# Patient Record
Sex: FEMALE | Race: BLACK OR AFRICAN AMERICAN | NOT HISPANIC OR LATINO | Employment: FULL TIME | URBAN - NONMETROPOLITAN AREA
[De-identification: names, ages, dates, MRNs, and addresses within clinical notes are randomized per-mention and may not be internally consistent; named-entity substitution may affect disease eponyms.]

---

## 2018-10-22 ENCOUNTER — HISTORICAL (OUTPATIENT)
Dept: ADMINISTRATIVE | Facility: HOSPITAL | Age: 23
End: 2018-10-22

## 2019-07-01 ENCOUNTER — HISTORICAL (OUTPATIENT)
Dept: ADMINISTRATIVE | Facility: HOSPITAL | Age: 24
End: 2019-07-01

## 2019-10-17 ENCOUNTER — HISTORICAL (OUTPATIENT)
Dept: ADMINISTRATIVE | Facility: HOSPITAL | Age: 24
End: 2019-10-17

## 2019-12-23 LAB
CLARITY, POC UA: CLEAR
COLOR, POC UA: YELLOW
GLUCOSE UR QL STRIP: NEGATIVE
LEUKOCYTE EST, POC UA: NEGATIVE
NITRITE, POC UA: NEGATIVE
PROTEIN, POC: NEGATIVE

## 2020-02-10 LAB
CLARITY, POC UA: NORMAL
COLOR, POC UA: YELLOW
GLUCOSE UR QL STRIP: NEGATIVE
LEUKOCYTE EST, POC UA: NEGATIVE
NITRITE, POC UA: NEGATIVE
PROTEIN, POC: NEGATIVE

## 2021-10-04 LAB
BILIRUB SERPL-MCNC: NEGATIVE MG/DL
BLOOD URINE, POC: NEGATIVE
CLARITY, POC UA: CLEAR
COLOR, POC UA: YELLOW
GLUCOSE UR QL STRIP: NEGATIVE
HBV SURFACE AG SERPL QL IA: NEGATIVE
HCV AB SERPL QL IA: NONREACTIVE
HIV 1+2 AB+HIV1 P24 AG SERPL QL IA: NORMAL
KETONES UR QL STRIP: NEGATIVE
LEUKOCYTE EST, POC UA: NORMAL
NITRITE, POC UA: NEGATIVE
PAP RECOMMENDATION EXT: NORMAL
PAP SMEAR: NORMAL
PH, POC UA: 7
POC BETA-HCG (QUAL): POSITIVE
PROTEIN, POC: NEGATIVE
RPR SER QL: NORMAL
SPECIFIC GRAVITY, POC UA: 1.02
UROBILINOGEN, POC UA: NORMAL

## 2021-10-19 LAB
BILIRUB SERPL-MCNC: NEGATIVE MG/DL
BLOOD URINE, POC: NORMAL
CLARITY, POC UA: CLEAR
COLOR, POC UA: YELLOW
GLUCOSE UR QL STRIP: NEGATIVE
KETONES UR QL STRIP: NEGATIVE
LEUKOCYTE EST, POC UA: NORMAL
NITRITE, POC UA: NEGATIVE
PH, POC UA: 6
PROTEIN, POC: NEGATIVE
SPECIFIC GRAVITY, POC UA: NORMAL
UROBILINOGEN, POC UA: NORMAL

## 2021-11-18 LAB
CLARITY, POC UA: CLEAR
COLOR, POC UA: YELLOW
GLUCOSE UR QL STRIP: NEGATIVE
LEUKOCYTE EST, POC UA: NEGATIVE
NITRITE, POC UA: POSITIVE
PROTEIN, POC: NEGATIVE

## 2022-01-04 ENCOUNTER — HISTORICAL (OUTPATIENT)
Dept: ADMINISTRATIVE | Facility: HOSPITAL | Age: 27
End: 2022-01-04

## 2022-01-11 LAB
CLARITY, POC UA: CLEAR
COLOR, POC UA: YELLOW
GLUCOSE UR QL STRIP: NEGATIVE
LEUKOCYTE EST, POC UA: NEGATIVE
NITRITE, POC UA: NEGATIVE

## 2022-03-15 LAB
ERYTHROCYTE [DISTWIDTH] IN BLOOD BY AUTOMATED COUNT: 14.4 % (ref 11–14.5)
HCT VFR BLD AUTO: 37 % (ref 36–48)
HGB BLD-MCNC: 12 G/DL (ref 11.8–16)
MCH RBC QN AUTO: 25.7 PG (ref 27–34)
MCHC RBC AUTO-ENTMCNC: 32.4 G/DL (ref 31–37)
MCV RBC AUTO: 79.2 FL (ref 79–99)
PLATELET # BLD AUTO: 384 10*3/UL (ref 140–371)
PMV BLD AUTO: 9.3 FL (ref 9.4–12.4)
RBC # BLD AUTO: 4.67 10*6/UL (ref 4–5.1)
WBC # SPEC AUTO: 9.3 10*3/UL (ref 4–11.5)

## 2022-04-10 ENCOUNTER — HISTORICAL (OUTPATIENT)
Dept: ADMINISTRATIVE | Facility: HOSPITAL | Age: 27
End: 2022-04-10

## 2022-04-27 VITALS
WEIGHT: 223.75 LBS | SYSTOLIC BLOOD PRESSURE: 134 MMHG | BODY MASS INDEX: 35.96 KG/M2 | HEIGHT: 66 IN | DIASTOLIC BLOOD PRESSURE: 72 MMHG

## 2022-05-04 ENCOUNTER — HISTORICAL (OUTPATIENT)
Dept: ADMINISTRATIVE | Facility: HOSPITAL | Age: 27
End: 2022-05-04

## 2022-09-21 ENCOUNTER — HISTORICAL (OUTPATIENT)
Dept: ADMINISTRATIVE | Facility: HOSPITAL | Age: 27
End: 2022-09-21

## 2023-01-23 ENCOUNTER — DOCUMENTATION ONLY (OUTPATIENT)
Dept: ADMINISTRATIVE | Facility: HOSPITAL | Age: 28
End: 2023-01-23

## 2023-04-09 ENCOUNTER — HOSPITAL ENCOUNTER (EMERGENCY)
Facility: HOSPITAL | Age: 28
Discharge: HOME | End: 2023-04-10
Attending: FAMILY MEDICINE
Payer: MEDICAID

## 2023-04-09 DIAGNOSIS — N30.00 ACUTE CYSTITIS WITHOUT HEMATURIA: Primary | ICD-10-CM

## 2023-04-09 DIAGNOSIS — R10.11 RIGHT UPPER QUADRANT ABDOMINAL PAIN: ICD-10-CM

## 2023-04-09 LAB
ALBUMIN SERPL-MCNC: 4.1 G/DL (ref 3.4–5)
ALBUMIN/GLOB SERPL: 1.2 RATIO
ALP SERPL-CCNC: 107 UNIT/L (ref 50–144)
ALT SERPL-CCNC: 20 UNIT/L (ref 1–45)
ANION GAP SERPL CALC-SCNC: 8 MEQ/L (ref 2–13)
APPEARANCE UR: CLEAR
AST SERPL-CCNC: 26 UNIT/L (ref 14–36)
B-HCG SERPL QL: NEGATIVE
BACTERIA #/AREA URNS AUTO: ABNORMAL /HPF
BASOPHILS # BLD AUTO: 0.03 X10(3)/MCL (ref 0.01–0.08)
BASOPHILS NFR BLD AUTO: 0.3 % (ref 0.1–1.2)
BILIRUB UR QL STRIP.AUTO: NEGATIVE MG/DL
BILIRUBIN DIRECT+TOT PNL SERPL-MCNC: 0.6 MG/DL (ref 0–1)
BUN SERPL-MCNC: 13 MG/DL (ref 7–20)
CALCIUM SERPL-MCNC: 9.1 MG/DL (ref 8.4–10.2)
CHLORIDE SERPL-SCNC: 108 MMOL/L (ref 98–110)
CO2 SERPL-SCNC: 22 MMOL/L (ref 21–32)
COLOR UR AUTO: YELLOW
CREAT SERPL-MCNC: 0.61 MG/DL (ref 0.66–1.25)
CREAT/UREA NIT SERPL: 21 (ref 12–20)
EOSINOPHIL # BLD AUTO: 0.33 X10(3)/MCL (ref 0.04–0.36)
EOSINOPHIL NFR BLD AUTO: 3.6 % (ref 0.7–7)
ERYTHROCYTE [DISTWIDTH] IN BLOOD BY AUTOMATED COUNT: 14.1 % (ref 11–14.5)
GFR SERPLBLD CREATININE-BSD FMLA CKD-EPI: >90 MLS/MIN/1.73/M2
GLOBULIN SER-MCNC: 3.4 GM/DL (ref 2–3.9)
GLUCOSE SERPL-MCNC: 96 MG/DL (ref 70–115)
GLUCOSE UR QL STRIP.AUTO: NEGATIVE MG/DL
HCT VFR BLD AUTO: 36.9 % (ref 36–48)
HGB BLD-MCNC: 12.2 G/DL (ref 11.8–16)
IMM GRANULOCYTES # BLD AUTO: 0.01 X10(3)/MCL (ref 0–0.03)
IMM GRANULOCYTES NFR BLD AUTO: 0.1 % (ref 0–0.5)
KETONES UR QL STRIP.AUTO: >=80 MG/DL
LEUKOCYTE ESTERASE UR QL STRIP.AUTO: NEGATIVE UNIT/L
LIPASE SERPL-CCNC: 41 U/L (ref 23–300)
LYMPHOCYTES # BLD AUTO: 2.6 X10(3)/MCL (ref 1.16–3.74)
LYMPHOCYTES NFR BLD AUTO: 28.1 % (ref 20–55)
MCH RBC QN AUTO: 26.2 PG (ref 27–34)
MCV RBC AUTO: 79.2 FL (ref 79–99)
MEAN CELL HEMOGLOBIN CONCENTRATION (OHS) G/DL: 33.1 G/DL (ref 31–37)
MONOCYTES # BLD AUTO: 0.5 X10(3)/MCL (ref 0.24–0.36)
MONOCYTES NFR BLD AUTO: 5.4 % (ref 4.7–12.5)
NEUTROPHILS # BLD AUTO: 5.79 X10(3)/MCL (ref 1.56–6.13)
NEUTROPHILS NFR BLD AUTO: 62.5 % (ref 37–73)
NITRITE UR QL STRIP.AUTO: POSITIVE
NRBC BLD AUTO-RTO: 0 % (ref 0–1)
PH UR STRIP.AUTO: 6 [PH]
PLATELET # BLD AUTO: 511 X10(3)/MCL (ref 140–371)
PMV BLD AUTO: 9.1 FL (ref 9.4–12.4)
POTASSIUM SERPL-SCNC: 3.5 MMOL/L (ref 3.5–5.1)
PROT SERPL-MCNC: 7.5 GM/DL (ref 6.3–8.2)
PROT UR QL STRIP.AUTO: NEGATIVE MG/DL
RBC # BLD AUTO: 4.66 X10(6)/MCL (ref 4–5.1)
RBC #/AREA URNS AUTO: ABNORMAL /HPF
RBC UR QL AUTO: ABNORMAL UNIT/L
SODIUM SERPL-SCNC: 138 MMOL/L (ref 135–145)
SP GR UR STRIP.AUTO: 1.02
SQUAMOUS #/AREA URNS AUTO: ABNORMAL /HPF
UROBILINOGEN UR STRIP-ACNC: 4 MG/DL
WBC # SPEC AUTO: 9.3 X10(3)/MCL (ref 4–11.5)
WBC #/AREA URNS AUTO: ABNORMAL /HPF

## 2023-04-09 PROCEDURE — 25000003 PHARM REV CODE 250: Performed by: FAMILY MEDICINE

## 2023-04-09 PROCEDURE — 25500020 PHARM REV CODE 255: Performed by: FAMILY MEDICINE

## 2023-04-09 PROCEDURE — 36415 COLL VENOUS BLD VENIPUNCTURE: CPT | Performed by: FAMILY MEDICINE

## 2023-04-09 PROCEDURE — 87088 URINE BACTERIA CULTURE: CPT | Performed by: FAMILY MEDICINE

## 2023-04-09 PROCEDURE — 96360 HYDRATION IV INFUSION INIT: CPT

## 2023-04-09 PROCEDURE — 63600175 PHARM REV CODE 636 W HCPCS: Performed by: FAMILY MEDICINE

## 2023-04-09 PROCEDURE — 85025 COMPLETE CBC W/AUTO DIFF WBC: CPT | Performed by: FAMILY MEDICINE

## 2023-04-09 PROCEDURE — 96365 THER/PROPH/DIAG IV INF INIT: CPT | Mod: 59

## 2023-04-09 PROCEDURE — 87077 CULTURE AEROBIC IDENTIFY: CPT | Performed by: FAMILY MEDICINE

## 2023-04-09 PROCEDURE — 83690 ASSAY OF LIPASE: CPT | Performed by: FAMILY MEDICINE

## 2023-04-09 PROCEDURE — 96375 TX/PRO/DX INJ NEW DRUG ADDON: CPT

## 2023-04-09 PROCEDURE — 81025 URINE PREGNANCY TEST: CPT | Performed by: FAMILY MEDICINE

## 2023-04-09 PROCEDURE — 99285 EMERGENCY DEPT VISIT HI MDM: CPT | Mod: 25

## 2023-04-09 PROCEDURE — 80053 COMPREHEN METABOLIC PANEL: CPT | Performed by: FAMILY MEDICINE

## 2023-04-09 PROCEDURE — 81001 URINALYSIS AUTO W/SCOPE: CPT | Performed by: FAMILY MEDICINE

## 2023-04-09 RX ORDER — HYDROMORPHONE HYDROCHLORIDE 1 MG/ML
1 INJECTION, SOLUTION INTRAMUSCULAR; INTRAVENOUS; SUBCUTANEOUS
Status: COMPLETED | OUTPATIENT
Start: 2023-04-09 | End: 2023-04-09

## 2023-04-09 RX ORDER — ONDANSETRON 2 MG/ML
8 INJECTION INTRAMUSCULAR; INTRAVENOUS
Status: COMPLETED | OUTPATIENT
Start: 2023-04-09 | End: 2023-04-09

## 2023-04-09 RX ORDER — SODIUM CHLORIDE 9 MG/ML
1000 INJECTION, SOLUTION INTRAVENOUS
Status: COMPLETED | OUTPATIENT
Start: 2023-04-09 | End: 2023-04-09

## 2023-04-09 RX ADMIN — IOPAMIDOL 100 ML: 755 INJECTION, SOLUTION INTRAVENOUS at 11:04

## 2023-04-09 RX ADMIN — SODIUM CHLORIDE 1000 ML: 9 INJECTION, SOLUTION INTRAVENOUS at 08:04

## 2023-04-09 RX ADMIN — CEFTRIAXONE SODIUM 1 G: 1 INJECTION, POWDER, FOR SOLUTION INTRAMUSCULAR; INTRAVENOUS at 11:04

## 2023-04-09 RX ADMIN — ONDANSETRON 8 MG: 2 INJECTION INTRAMUSCULAR; INTRAVENOUS at 11:04

## 2023-04-09 RX ADMIN — HYDROMORPHONE HYDROCHLORIDE 1 MG: 1 INJECTION, SOLUTION INTRAMUSCULAR; INTRAVENOUS; SUBCUTANEOUS at 11:04

## 2023-04-10 VITALS
HEIGHT: 66 IN | WEIGHT: 230 LBS | SYSTOLIC BLOOD PRESSURE: 140 MMHG | BODY MASS INDEX: 36.96 KG/M2 | HEART RATE: 91 BPM | OXYGEN SATURATION: 100 % | RESPIRATION RATE: 18 BRPM | DIASTOLIC BLOOD PRESSURE: 95 MMHG | TEMPERATURE: 98 F

## 2023-04-10 RX ORDER — ONDANSETRON 4 MG/1
8 TABLET, ORALLY DISINTEGRATING ORAL EVERY 8 HOURS PRN
Qty: 9 TABLET | Refills: 0 | Status: SHIPPED | OUTPATIENT
Start: 2023-04-10 | End: 2023-04-13

## 2023-04-10 RX ORDER — HYDROCODONE BITARTRATE AND ACETAMINOPHEN 5; 325 MG/1; MG/1
1 TABLET ORAL EVERY 6 HOURS PRN
Qty: 12 TABLET | Refills: 0 | Status: SHIPPED | OUTPATIENT
Start: 2023-04-10 | End: 2023-04-13

## 2023-04-10 RX ORDER — CIPROFLOXACIN 500 MG/1
500 TABLET ORAL EVERY 12 HOURS
Qty: 10 TABLET | Refills: 0 | Status: ON HOLD | OUTPATIENT
Start: 2023-04-10 | End: 2023-04-18 | Stop reason: HOSPADM

## 2023-04-10 NOTE — ED PROVIDER NOTES
Encounter Date: 4/9/2023       History     Chief Complaint   Patient presents with    Abdominal Pain     Patient c/o of RUQ abdominal pain x3 days. Has tried several OTC pain medication with no relief. Denies n/v/d     27-year-old black female presents to emergency room complaining of right upper quadrant pain with some radiation to her back for the last 3 days.  Patient states he is had some nausea but no vomiting or diarrhea.  Patient also denies any voiding complaints.  Patient states she does have bloating and belching with eating fried foods fatty foods.  Patient states eating is made the pain worse over the last 3 days.  Patient denies any fever.    The history is provided by the patient.   Review of patient's allergies indicates:  No Known Allergies  Past Medical History:   Diagnosis Date    Hypertension      History reviewed. No pertinent surgical history.  History reviewed. No pertinent family history.  Social History     Tobacco Use    Smoking status: Never    Smokeless tobacco: Never   Substance Use Topics    Alcohol use: Never    Drug use: Never     Review of Systems   Gastrointestinal:  Positive for abdominal pain and nausea. Negative for diarrhea and vomiting.   All other systems reviewed and are negative.    Physical Exam     Initial Vitals [04/09/23 2016]   BP Pulse Resp Temp SpO2   (!) 170/107 102 18 98.8 °F (37.1 °C) 100 %      MAP       --         Physical Exam    Nursing note and vitals reviewed.  Constitutional:   Moderately obese black female alert in no acute distress.   HENT:   In his atraumatic and normocephalic.  Oropharynx is clear with moist mucous membranes.  Nose is clear with no discharge.   Neck:   Neck is supple with full range of motion with no cervical adenopathy.   Cardiovascular:            Heart reveals tachycardic regular rate and rhythm out murmur.   Pulmonary/Chest:   Lungs are clear to auscultation bilaterally.   Abdominal:   Abdomen has positive bowel sounds throughout.   Marked right upper quadrant tenderness with positive Mckeon's sign.  No other guarding or rebound is noted.  Plus minus right CVA tenderness.   Musculoskeletal:      Comments: Extremities with full range of motion throughout with no clubbing cyanosis or edema.     Neurological:   Patient is alert and oriented x3 with normal sensation and strength in bilateral upper lower extremities.   Skin:   Skin is warm and dry.   Psychiatric: She has a normal mood and affect. Her behavior is normal. Thought content normal.       ED Course   Procedures  Labs Reviewed   COMPREHENSIVE METABOLIC PANEL - Abnormal; Notable for the following components:       Result Value    Creatinine 0.61 (*)     BUN/Creatinine Ratio 21 (*)     All other components within normal limits   CBC WITH DIFFERENTIAL - Abnormal; Notable for the following components:    MCH 26.2 (*)     Platelet 511 (*)     MPV 9.1 (*)     Mono # 0.50 (*)     All other components within normal limits   URINALYSIS - Abnormal; Notable for the following components:    Ketones, UA >=80 (*)     Blood, UA Large (*)     Urobilinogen, UA 4.0 (*)     Nitrites, UA Positive (*)     All other components within normal limits    Narrative:      URINE STABILITY IS 2 HOURS AT ROOM TEMP OR    SIX HOURS REFRIGERATED. PERFORMING TESTING ON    SPECIMENS GREATER THAN THIS AGE MAY AFFECT THE    FOLLOWING TESTS:    PH          SPECIFIC GRAVITY           BLOOD    CLARITY     BILIRUBIN               UROBILINOGEN   URINALYSIS, MICROSCOPIC - Abnormal; Notable for the following components:    Bacteria, UA 4+ (*)     WBC, UA 6-10 (*)     Squamous Epithelial Cells, UA Few (*)     All other components within normal limits   LIPASE - Normal   HCG QUALITATIVE URINE - Normal   CULTURE, URINE   CBC W/ AUTO DIFFERENTIAL    Narrative:     The following orders were created for panel order CBC auto differential.  Procedure                               Abnormality         Status                     ---------                                -----------         ------                     CBC with Differential[[id number]]        Abnormal            Final result                 Please view results for these tests on the individual orders.          Imaging Results              CT Abdomen Pelvis With Contrast (Final result)  Result time 04/09/23 23:54:36      Final result by Clementina Pereyra MD (04/09/23 23:54:36)                   Impression:       No acute findings are identified    All CT scans at [this location] are performed using dose modulation techniques as appropriate to a performed exam including the following: automated exposure control; adjustment of the mA and/or kV according to patient size (this includes techniques or standardized protocols for targeted exams where dose is matched to indication / reason for exam; i.e. extremities or head); use of iterative reconstruction technique.    Finalized on: 4/9/2023 11:54 PM By:  Clementina Pereyra MD  [facility]# [id number]      2023-04-09 23:56:44.930    [facility]               Narrative:    EXAM: CT ABDOMEN PELVIS WITH CONTRAST    CLINICAL INDICATION:  Right lower quadrant pain    TECHNIQUE: Axial and multiplanar 2-D reformations provided    FINDINGS:  Appendix normal caliber.  No obstructive bowel findings    Liver, spleen pancreas and kidneys unremarkable.  Gallbladder present  Urinary bladder decompressed  Intrauterine device in place                                         Medications   cefTRIAXone (ROCEPHIN) 1 g in dextrose 5 % in water (D5W) 5 % 50 mL IVPB (MB+) (1 g Intravenous New Bag 4/9/23 2350)   0.9%  NaCl infusion (0 mLs Intravenous Stopped 4/9/23 2153)   iopamidoL (ISOVUE-370) injection 100 mL (100 mLs Intravenous Given 4/9/23 2332)   ondansetron injection 8 mg (8 mg Intravenous Given 4/9/23 2349)   HYDROmorphone injection 1 mg (1 mg Intravenous Given 4/9/23 2349)     Medical Decision Making:   Initial Assessment:   Biliary colic  Differential Diagnosis:   Cholecystitis,  pyelonephritis, GERD, gastritis  Clinical Tests:   Lab Tests: Ordered and Reviewed  The following lab test(s) were unremarkable: CBC, CMP, Urinalysis and Lipase  Radiological Study: Ordered and Reviewed  ED Management:  Patient initially refused pain medications but after CT scan she requested nausea and pain medications.  CT of abdomen and pelvis is unremarkable no acute findings.  Patient is noted to have a UTI on her urinalysis when she will be treated for my given g Rocephin here in the emergency room and sent her home on Cipro b.i.d. for 5 days.  Patient instructed to follow up with her PCP for further abdominal pain workup and possibly a HIDA scan.                        Clinical Impression:   Final diagnoses:  [N30.00] Acute cystitis without hematuria (Primary)  [R10.11] Right upper quadrant abdominal pain               Roe Fine MD  04/10/23 0007

## 2023-04-12 ENCOUNTER — HOSPITAL ENCOUNTER (INPATIENT)
Facility: HOSPITAL | Age: 28
LOS: 6 days | Discharge: HOME | DRG: 418 | End: 2023-04-18
Attending: FAMILY MEDICINE | Admitting: FAMILY MEDICINE
Payer: MEDICAID

## 2023-04-12 DIAGNOSIS — K80.00 ACUTE CHOLECYSTITIS DUE TO BILIARY CALCULUS: Primary | ICD-10-CM

## 2023-04-12 LAB — BACTERIA UR CULT: ABNORMAL

## 2023-04-12 PROCEDURE — 99285 EMERGENCY DEPT VISIT HI MDM: CPT | Mod: 25

## 2023-04-12 PROCEDURE — 96375 TX/PRO/DX INJ NEW DRUG ADDON: CPT

## 2023-04-12 PROCEDURE — 11000001 HC ACUTE MED/SURG PRIVATE ROOM

## 2023-04-12 PROCEDURE — 96374 THER/PROPH/DIAG INJ IV PUSH: CPT

## 2023-04-12 NOTE — Clinical Note
Rossy Castro accompanied their sister(s) to the emergency department on 4/12/2023. They may return to work on 04/14/2023.      If you have any questions or concerns, please don't hesitate to call.      Uzma PINTO

## 2023-04-13 ENCOUNTER — ANESTHESIA (OUTPATIENT)
Dept: SURGERY | Facility: HOSPITAL | Age: 28
DRG: 418 | End: 2023-04-13
Payer: MEDICAID

## 2023-04-13 ENCOUNTER — ANESTHESIA EVENT (OUTPATIENT)
Dept: SURGERY | Facility: HOSPITAL | Age: 28
DRG: 418 | End: 2023-04-13
Payer: MEDICAID

## 2023-04-13 PROBLEM — K81.0 ACUTE CHOLECYSTITIS: Status: ACTIVE | Noted: 2023-04-13

## 2023-04-13 LAB
ALBUMIN SERPL-MCNC: 3.4 G/DL (ref 3.4–5)
ALBUMIN SERPL-MCNC: 3.8 G/DL (ref 3.4–5)
ALBUMIN SERPL-MCNC: 4.5 G/DL (ref 3.4–5)
ALBUMIN/GLOB SERPL: 1.1 RATIO
ALBUMIN/GLOB SERPL: 1.2 RATIO
ALBUMIN/GLOB SERPL: 1.3 RATIO
ALP SERPL-CCNC: 123 UNIT/L (ref 50–144)
ALP SERPL-CCNC: 137 UNIT/L (ref 50–144)
ALP SERPL-CCNC: 154 UNIT/L (ref 50–144)
ALT SERPL-CCNC: 57 UNIT/L (ref 1–45)
ALT SERPL-CCNC: 61 UNIT/L (ref 1–45)
ALT SERPL-CCNC: 64 UNIT/L (ref 1–45)
ANION GAP SERPL CALC-SCNC: 10 MEQ/L (ref 2–13)
ANION GAP SERPL CALC-SCNC: 6 MEQ/L (ref 2–13)
ANION GAP SERPL CALC-SCNC: 7 MEQ/L (ref 2–13)
APPEARANCE UR: CLEAR
AST SERPL-CCNC: 52 UNIT/L (ref 14–36)
AST SERPL-CCNC: 63 UNIT/L (ref 14–36)
AST SERPL-CCNC: 65 UNIT/L (ref 14–36)
B-HCG SERPL QL: NEGATIVE
BACTERIA #/AREA URNS AUTO: ABNORMAL /HPF
BASOPHILS # BLD AUTO: 0.01 X10(3)/MCL (ref 0.01–0.08)
BASOPHILS # BLD AUTO: 0.03 X10(3)/MCL (ref 0.01–0.08)
BASOPHILS # BLD AUTO: 0.03 X10(3)/MCL (ref 0.01–0.08)
BASOPHILS NFR BLD AUTO: 0.1 % (ref 0.1–1.2)
BASOPHILS NFR BLD AUTO: 0.2 % (ref 0.1–1.2)
BASOPHILS NFR BLD AUTO: 0.2 % (ref 0.1–1.2)
BILIRUB UR QL STRIP.AUTO: NEGATIVE MG/DL
BILIRUBIN DIRECT+TOT PNL SERPL-MCNC: 0.8 MG/DL (ref 0–1)
BILIRUBIN DIRECT+TOT PNL SERPL-MCNC: 0.9 MG/DL (ref 0–1)
BILIRUBIN DIRECT+TOT PNL SERPL-MCNC: 0.9 MG/DL (ref 0–1)
BUN SERPL-MCNC: 2 MG/DL (ref 7–20)
BUN SERPL-MCNC: 2 MG/DL (ref 7–20)
BUN SERPL-MCNC: 3 MG/DL (ref 7–20)
CALCIUM SERPL-MCNC: 8.6 MG/DL (ref 8.4–10.2)
CALCIUM SERPL-MCNC: 8.7 MG/DL (ref 8.4–10.2)
CALCIUM SERPL-MCNC: 9.7 MG/DL (ref 8.4–10.2)
CHLORIDE SERPL-SCNC: 101 MMOL/L (ref 98–110)
CHLORIDE SERPL-SCNC: 104 MMOL/L (ref 98–110)
CHLORIDE SERPL-SCNC: 99 MMOL/L (ref 98–110)
CO2 SERPL-SCNC: 25 MMOL/L (ref 21–32)
CO2 SERPL-SCNC: 27 MMOL/L (ref 21–32)
CO2 SERPL-SCNC: 30 MMOL/L (ref 21–32)
COLOR UR AUTO: YELLOW
CREAT SERPL-MCNC: 0.56 MG/DL (ref 0.66–1.25)
CREAT SERPL-MCNC: 0.57 MG/DL (ref 0.66–1.25)
CREAT SERPL-MCNC: 0.7 MG/DL (ref 0.66–1.25)
CREAT/UREA NIT SERPL: 4 (ref 12–20)
EOSINOPHIL # BLD AUTO: 0 X10(3)/MCL (ref 0.04–0.36)
EOSINOPHIL # BLD AUTO: 0.06 X10(3)/MCL (ref 0.04–0.36)
EOSINOPHIL # BLD AUTO: 0.07 X10(3)/MCL (ref 0.04–0.36)
EOSINOPHIL NFR BLD AUTO: 0 % (ref 0.7–7)
EOSINOPHIL NFR BLD AUTO: 0.5 % (ref 0.7–7)
EOSINOPHIL NFR BLD AUTO: 0.5 % (ref 0.7–7)
ERYTHROCYTE [DISTWIDTH] IN BLOOD BY AUTOMATED COUNT: 13.3 % (ref 11–14.5)
ERYTHROCYTE [DISTWIDTH] IN BLOOD BY AUTOMATED COUNT: 13.3 % (ref 11–14.5)
ERYTHROCYTE [DISTWIDTH] IN BLOOD BY AUTOMATED COUNT: 13.6 % (ref 11–14.5)
GFR SERPLBLD CREATININE-BSD FMLA CKD-EPI: >90 MLS/MIN/1.73/M2
GLOBULIN SER-MCNC: 2.9 GM/DL (ref 2–3.9)
GLOBULIN SER-MCNC: 3 GM/DL (ref 2–3.9)
GLOBULIN SER-MCNC: 4.2 GM/DL (ref 2–3.9)
GLUCOSE SERPL-MCNC: 121 MG/DL (ref 70–115)
GLUCOSE SERPL-MCNC: 132 MG/DL (ref 70–115)
GLUCOSE SERPL-MCNC: 143 MG/DL (ref 70–115)
GLUCOSE UR QL STRIP.AUTO: NEGATIVE MG/DL
HCT VFR BLD AUTO: 37.3 % (ref 36–48)
HCT VFR BLD AUTO: 38.5 % (ref 36–48)
HCT VFR BLD AUTO: 40.5 % (ref 36–48)
HGB BLD-MCNC: 12.2 G/DL (ref 11.8–16)
HGB BLD-MCNC: 12.5 G/DL (ref 11.8–16)
HGB BLD-MCNC: 13.6 G/DL (ref 11.8–16)
IMM GRANULOCYTES # BLD AUTO: 0.03 X10(3)/MCL (ref 0–0.03)
IMM GRANULOCYTES # BLD AUTO: 0.05 X10(3)/MCL (ref 0–0.03)
IMM GRANULOCYTES # BLD AUTO: 0.05 X10(3)/MCL (ref 0–0.03)
IMM GRANULOCYTES NFR BLD AUTO: 0.2 % (ref 0–0.5)
IMM GRANULOCYTES NFR BLD AUTO: 0.3 % (ref 0–0.5)
IMM GRANULOCYTES NFR BLD AUTO: 0.4 % (ref 0–0.5)
KETONES UR QL STRIP.AUTO: 40 MG/DL
LEUKOCYTE ESTERASE UR QL STRIP.AUTO: ABNORMAL UNIT/L
LIPASE SERPL-CCNC: 51 U/L (ref 23–300)
LYMPHOCYTES # BLD AUTO: 1.07 X10(3)/MCL (ref 1.16–3.74)
LYMPHOCYTES # BLD AUTO: 1.92 X10(3)/MCL (ref 1.16–3.74)
LYMPHOCYTES # BLD AUTO: 1.95 X10(3)/MCL (ref 1.16–3.74)
LYMPHOCYTES NFR BLD AUTO: 14.3 % (ref 20–55)
LYMPHOCYTES NFR BLD AUTO: 15.3 % (ref 20–55)
LYMPHOCYTES NFR BLD AUTO: 5.9 % (ref 20–55)
MAGNESIUM SERPL-MCNC: 2.2 MG/DL (ref 1.8–2.4)
MCH RBC QN AUTO: 25.2 PG (ref 27–34)
MCH RBC QN AUTO: 25.8 PG (ref 27–34)
MCH RBC QN AUTO: 26.3 PG (ref 27–34)
MCV RBC AUTO: 77.6 FL (ref 79–99)
MCV RBC AUTO: 78.2 FL (ref 79–99)
MCV RBC AUTO: 79 FL (ref 79–99)
MEAN CELL HEMOGLOBIN CONCENTRATION (OHS) G/DL: 32.5 G/DL (ref 31–37)
MEAN CELL HEMOGLOBIN CONCENTRATION (OHS) G/DL: 32.7 G/DL (ref 31–37)
MEAN CELL HEMOGLOBIN CONCENTRATION (OHS) G/DL: 33.6 G/DL (ref 31–37)
MONOCYTES # BLD AUTO: 0.78 X10(3)/MCL (ref 0.24–0.36)
MONOCYTES # BLD AUTO: 0.8 X10(3)/MCL (ref 0.24–0.36)
MONOCYTES # BLD AUTO: 0.81 X10(3)/MCL (ref 0.24–0.36)
MONOCYTES NFR BLD AUTO: 4.3 % (ref 4.7–12.5)
MONOCYTES NFR BLD AUTO: 5.9 % (ref 4.7–12.5)
MONOCYTES NFR BLD AUTO: 6.4 % (ref 4.7–12.5)
NEUTROPHILS # BLD AUTO: 10.76 X10(3)/MCL (ref 1.56–6.13)
NEUTROPHILS # BLD AUTO: 16.15 X10(3)/MCL (ref 1.56–6.13)
NEUTROPHILS # BLD AUTO: 9.66 X10(3)/MCL (ref 1.56–6.13)
NEUTROPHILS NFR BLD AUTO: 77.2 % (ref 37–73)
NEUTROPHILS NFR BLD AUTO: 78.9 % (ref 37–73)
NEUTROPHILS NFR BLD AUTO: 89.4 % (ref 37–73)
NITRITE UR QL STRIP.AUTO: NEGATIVE
NRBC BLD AUTO-RTO: 0 % (ref 0–1)
PH UR STRIP.AUTO: 6 [PH]
PLATELET # BLD AUTO: 502 X10(3)/MCL (ref 140–371)
PLATELET # BLD AUTO: 516 X10(3)/MCL (ref 140–371)
PLATELET # BLD AUTO: 562 X10(3)/MCL (ref 140–371)
PMV BLD AUTO: 8.6 FL (ref 9.4–12.4)
PMV BLD AUTO: 8.7 FL (ref 9.4–12.4)
PMV BLD AUTO: 8.9 FL (ref 9.4–12.4)
POTASSIUM SERPL-SCNC: 2.9 MMOL/L (ref 3.5–5.1)
POTASSIUM SERPL-SCNC: 3.1 MMOL/L (ref 3.5–5.1)
POTASSIUM SERPL-SCNC: 3.6 MMOL/L (ref 3.5–5.1)
PROT SERPL-MCNC: 6.3 GM/DL (ref 6.3–8.2)
PROT SERPL-MCNC: 6.8 GM/DL (ref 6.3–8.2)
PROT SERPL-MCNC: 8.7 GM/DL (ref 6.3–8.2)
PROT UR QL STRIP.AUTO: ABNORMAL MG/DL
RBC # BLD AUTO: 4.72 X10(6)/MCL (ref 4–5.1)
RBC # BLD AUTO: 4.96 X10(6)/MCL (ref 4–5.1)
RBC # BLD AUTO: 5.18 X10(6)/MCL (ref 4–5.1)
RBC #/AREA URNS AUTO: ABNORMAL /HPF
RBC UR QL AUTO: ABNORMAL UNIT/L
SARS-COV-2 RDRP RESP QL NAA+PROBE: NEGATIVE
SODIUM SERPL-SCNC: 136 MMOL/L (ref 135–145)
SODIUM SERPL-SCNC: 136 MMOL/L (ref 135–145)
SODIUM SERPL-SCNC: 137 MMOL/L (ref 135–145)
SP GR UR STRIP.AUTO: 1.02
SQUAMOUS #/AREA URNS AUTO: ABNORMAL /HPF
UROBILINOGEN UR STRIP-ACNC: 0.2 MG/DL
WBC # SPEC AUTO: 12.5 X10(3)/MCL (ref 4–11.5)
WBC # SPEC AUTO: 13.7 X10(3)/MCL (ref 4–11.5)
WBC # SPEC AUTO: 18.1 X10(3)/MCL (ref 4–11.5)
WBC #/AREA URNS AUTO: ABNORMAL /HPF

## 2023-04-13 PROCEDURE — D9220A PRA ANESTHESIA: Mod: ,,, | Performed by: ANESTHESIOLOGY

## 2023-04-13 PROCEDURE — 25000003 PHARM REV CODE 250: Performed by: SURGERY

## 2023-04-13 PROCEDURE — 11000001 HC ACUTE MED/SURG PRIVATE ROOM

## 2023-04-13 PROCEDURE — 63600175 PHARM REV CODE 636 W HCPCS: Performed by: SURGERY

## 2023-04-13 PROCEDURE — 37000009 HC ANESTHESIA EA ADD 15 MINS: Performed by: SURGERY

## 2023-04-13 PROCEDURE — 63600175 PHARM REV CODE 636 W HCPCS: Performed by: FAMILY MEDICINE

## 2023-04-13 PROCEDURE — 27000221 HC OXYGEN, UP TO 24 HOURS

## 2023-04-13 PROCEDURE — 37000008 HC ANESTHESIA 1ST 15 MINUTES: Performed by: SURGERY

## 2023-04-13 PROCEDURE — C9113 INJ PANTOPRAZOLE SODIUM, VIA: HCPCS | Performed by: FAMILY MEDICINE

## 2023-04-13 PROCEDURE — 25000003 PHARM REV CODE 250: Performed by: FAMILY MEDICINE

## 2023-04-13 PROCEDURE — 87088 URINE BACTERIA CULTURE: CPT | Performed by: FAMILY MEDICINE

## 2023-04-13 PROCEDURE — 94761 N-INVAS EAR/PLS OXIMETRY MLT: CPT

## 2023-04-13 PROCEDURE — 80053 COMPREHEN METABOLIC PANEL: CPT | Performed by: FAMILY MEDICINE

## 2023-04-13 PROCEDURE — 63600175 PHARM REV CODE 636 W HCPCS: Performed by: INTERNAL MEDICINE

## 2023-04-13 PROCEDURE — 85025 COMPLETE CBC W/AUTO DIFF WBC: CPT | Performed by: FAMILY MEDICINE

## 2023-04-13 PROCEDURE — 85025 COMPLETE CBC W/AUTO DIFF WBC: CPT | Performed by: SURGERY

## 2023-04-13 PROCEDURE — 36000709 HC OR TIME LEV III EA ADD 15 MIN: Performed by: SURGERY

## 2023-04-13 PROCEDURE — 63600175 PHARM REV CODE 636 W HCPCS: Performed by: ANESTHESIOLOGY

## 2023-04-13 PROCEDURE — 25000003 PHARM REV CODE 250: Performed by: ANESTHESIOLOGY

## 2023-04-13 PROCEDURE — 85025 COMPLETE CBC W/AUTO DIFF WBC: CPT | Performed by: INTERNAL MEDICINE

## 2023-04-13 PROCEDURE — D9220A PRA ANESTHESIA: ICD-10-PCS | Mod: ,,, | Performed by: ANESTHESIOLOGY

## 2023-04-13 PROCEDURE — 87635 SARS-COV-2 COVID-19 AMP PRB: CPT | Performed by: FAMILY MEDICINE

## 2023-04-13 PROCEDURE — 36000708 HC OR TIME LEV III 1ST 15 MIN: Performed by: SURGERY

## 2023-04-13 PROCEDURE — 81025 URINE PREGNANCY TEST: CPT | Performed by: FAMILY MEDICINE

## 2023-04-13 PROCEDURE — 83690 ASSAY OF LIPASE: CPT | Performed by: FAMILY MEDICINE

## 2023-04-13 PROCEDURE — 80053 COMPREHEN METABOLIC PANEL: CPT | Performed by: SURGERY

## 2023-04-13 PROCEDURE — 36415 COLL VENOUS BLD VENIPUNCTURE: CPT | Performed by: INTERNAL MEDICINE

## 2023-04-13 PROCEDURE — 71000033 HC RECOVERY, INTIAL HOUR: Performed by: SURGERY

## 2023-04-13 PROCEDURE — 83735 ASSAY OF MAGNESIUM: CPT | Performed by: FAMILY MEDICINE

## 2023-04-13 PROCEDURE — 81001 URINALYSIS AUTO W/SCOPE: CPT | Performed by: FAMILY MEDICINE

## 2023-04-13 PROCEDURE — 87040 BLOOD CULTURE FOR BACTERIA: CPT | Performed by: FAMILY MEDICINE

## 2023-04-13 PROCEDURE — 36415 COLL VENOUS BLD VENIPUNCTURE: CPT | Performed by: FAMILY MEDICINE

## 2023-04-13 PROCEDURE — 27201423 OPTIME MED/SURG SUP & DEVICES STERILE SUPPLY: Performed by: SURGERY

## 2023-04-13 PROCEDURE — C1729 CATH, DRAINAGE: HCPCS | Performed by: SURGERY

## 2023-04-13 RX ORDER — ONDANSETRON 2 MG/ML
4 INJECTION INTRAMUSCULAR; INTRAVENOUS EVERY 6 HOURS PRN
Status: DISCONTINUED | OUTPATIENT
Start: 2023-04-13 | End: 2023-04-18 | Stop reason: HOSPADM

## 2023-04-13 RX ORDER — FAMOTIDINE 20 MG/1
20 TABLET, FILM COATED ORAL
Status: COMPLETED | OUTPATIENT
Start: 2023-04-13 | End: 2023-04-13

## 2023-04-13 RX ORDER — MIDAZOLAM HYDROCHLORIDE 1 MG/ML
2 INJECTION INTRAMUSCULAR; INTRAVENOUS
Status: COMPLETED | OUTPATIENT
Start: 2023-04-13 | End: 2023-04-13

## 2023-04-13 RX ORDER — GLYCOPYRROLATE 0.2 MG/ML
0.2 INJECTION INTRAMUSCULAR; INTRAVENOUS
Status: COMPLETED | OUTPATIENT
Start: 2023-04-13 | End: 2023-04-13

## 2023-04-13 RX ORDER — CLONIDINE HYDROCHLORIDE 0.1 MG/1
0.2 TABLET ORAL EVERY 6 HOURS PRN
Status: DISCONTINUED | OUTPATIENT
Start: 2023-04-13 | End: 2023-04-18 | Stop reason: HOSPADM

## 2023-04-13 RX ORDER — HYDROMORPHONE HYDROCHLORIDE 2 MG/ML
INJECTION, SOLUTION INTRAMUSCULAR; INTRAVENOUS; SUBCUTANEOUS
Status: DISCONTINUED | OUTPATIENT
Start: 2023-04-13 | End: 2023-04-13

## 2023-04-13 RX ORDER — MIDAZOLAM HYDROCHLORIDE 1 MG/ML
2 INJECTION INTRAMUSCULAR; INTRAVENOUS
Status: DISCONTINUED | OUTPATIENT
Start: 2023-04-14 | End: 2023-04-13

## 2023-04-13 RX ORDER — HYDROMORPHONE HYDROCHLORIDE 1 MG/ML
1 INJECTION, SOLUTION INTRAMUSCULAR; INTRAVENOUS; SUBCUTANEOUS EVERY 6 HOURS PRN
Status: DISPENSED | OUTPATIENT
Start: 2023-04-13 | End: 2023-04-15

## 2023-04-13 RX ORDER — TALC
6 POWDER (GRAM) TOPICAL NIGHTLY PRN
Status: DISCONTINUED | OUTPATIENT
Start: 2023-04-13 | End: 2023-04-18 | Stop reason: HOSPADM

## 2023-04-13 RX ORDER — ONDANSETRON 2 MG/ML
8 INJECTION INTRAMUSCULAR; INTRAVENOUS
Status: COMPLETED | OUTPATIENT
Start: 2023-04-13 | End: 2023-04-13

## 2023-04-13 RX ORDER — ONDANSETRON 2 MG/ML
8 INJECTION INTRAMUSCULAR; INTRAVENOUS EVERY 8 HOURS PRN
Status: DISCONTINUED | OUTPATIENT
Start: 2023-04-13 | End: 2023-04-13 | Stop reason: SDUPTHER

## 2023-04-13 RX ORDER — KETOROLAC TROMETHAMINE 30 MG/ML
30 INJECTION, SOLUTION INTRAMUSCULAR; INTRAVENOUS ONCE
Status: COMPLETED | OUTPATIENT
Start: 2023-04-13 | End: 2023-04-13

## 2023-04-13 RX ORDER — SODIUM CHLORIDE AND POTASSIUM CHLORIDE 300; 900 MG/100ML; MG/100ML
INJECTION, SOLUTION INTRAVENOUS CONTINUOUS
Status: DISCONTINUED | OUTPATIENT
Start: 2023-04-13 | End: 2023-04-18

## 2023-04-13 RX ORDER — PANTOPRAZOLE SODIUM 40 MG/10ML
40 INJECTION, POWDER, LYOPHILIZED, FOR SOLUTION INTRAVENOUS DAILY
Status: COMPLETED | OUTPATIENT
Start: 2023-04-13 | End: 2023-04-14

## 2023-04-13 RX ORDER — VECURONIUM BROMIDE FOR INJECTION 1 MG/ML
INJECTION, POWDER, LYOPHILIZED, FOR SOLUTION INTRAVENOUS
Status: DISCONTINUED | OUTPATIENT
Start: 2023-04-13 | End: 2023-04-13

## 2023-04-13 RX ORDER — LIDOCAINE HYDROCHLORIDE AND EPINEPHRINE 10; 10 MG/ML; UG/ML
INJECTION, SOLUTION INFILTRATION; PERINEURAL
Status: DISCONTINUED | OUTPATIENT
Start: 2023-04-13 | End: 2023-04-13 | Stop reason: HOSPADM

## 2023-04-13 RX ORDER — HYDRALAZINE HYDROCHLORIDE 20 MG/ML
10 INJECTION INTRAMUSCULAR; INTRAVENOUS EVERY 4 HOURS PRN
Status: DISCONTINUED | OUTPATIENT
Start: 2023-04-13 | End: 2023-04-14

## 2023-04-13 RX ORDER — LABETALOL HCL 20 MG/4 ML
5 SYRINGE (ML) INTRAVENOUS ONCE
Status: COMPLETED | OUTPATIENT
Start: 2023-04-13 | End: 2023-04-13

## 2023-04-13 RX ORDER — DEXTROSE MONOHYDRATE, SODIUM CHLORIDE, AND POTASSIUM CHLORIDE 50; 2.98; 4.5 G/1000ML; G/1000ML; G/1000ML
INJECTION, SOLUTION INTRAVENOUS
Status: COMPLETED | OUTPATIENT
Start: 2023-04-13 | End: 2023-04-13

## 2023-04-13 RX ORDER — SODIUM CHLORIDE 9 MG/ML
INJECTION, SOLUTION INTRAVENOUS CONTINUOUS
Status: DISCONTINUED | OUTPATIENT
Start: 2023-04-13 | End: 2023-04-13

## 2023-04-13 RX ORDER — CIPROFLOXACIN 500 MG/1
500 TABLET ORAL EVERY 12 HOURS
Status: DISCONTINUED | OUTPATIENT
Start: 2023-04-13 | End: 2023-04-14

## 2023-04-13 RX ORDER — DEXTROSE MONOHYDRATE, SODIUM CHLORIDE, AND POTASSIUM CHLORIDE 50; 2.98; 4.5 G/1000ML; G/1000ML; G/1000ML
INJECTION, SOLUTION INTRAVENOUS CONTINUOUS
Status: DISCONTINUED | OUTPATIENT
Start: 2023-04-13 | End: 2023-04-13 | Stop reason: SDUPTHER

## 2023-04-13 RX ORDER — HYDROMORPHONE HYDROCHLORIDE 1 MG/ML
1 INJECTION, SOLUTION INTRAMUSCULAR; INTRAVENOUS; SUBCUTANEOUS
Status: COMPLETED | OUTPATIENT
Start: 2023-04-13 | End: 2023-04-13

## 2023-04-13 RX ORDER — SODIUM CHLORIDE 9 MG/ML
1000 INJECTION, SOLUTION INTRAVENOUS
Status: COMPLETED | OUTPATIENT
Start: 2023-04-13 | End: 2023-04-13

## 2023-04-13 RX ORDER — ACETAMINOPHEN 325 MG/1
650 TABLET ORAL EVERY 6 HOURS PRN
Status: DISCONTINUED | OUTPATIENT
Start: 2023-04-13 | End: 2023-04-13

## 2023-04-13 RX ORDER — NEOSTIGMINE METHYLSULFATE 1 MG/ML
INJECTION, SOLUTION INTRAVENOUS
Status: DISCONTINUED | OUTPATIENT
Start: 2023-04-13 | End: 2023-04-13

## 2023-04-13 RX ORDER — LABETALOL HYDROCHLORIDE 5 MG/ML
20 INJECTION, SOLUTION INTRAVENOUS ONCE
Status: COMPLETED | OUTPATIENT
Start: 2023-04-13 | End: 2023-04-13

## 2023-04-13 RX ORDER — MORPHINE SULFATE 2 MG/ML
2 INJECTION, SOLUTION INTRAMUSCULAR; INTRAVENOUS EVERY 4 HOURS PRN
Status: DISCONTINUED | OUTPATIENT
Start: 2023-04-13 | End: 2023-04-13

## 2023-04-13 RX ORDER — ENOXAPARIN SODIUM 100 MG/ML
40 INJECTION SUBCUTANEOUS EVERY 24 HOURS
Status: DISCONTINUED | OUTPATIENT
Start: 2023-04-13 | End: 2023-04-18 | Stop reason: HOSPADM

## 2023-04-13 RX ORDER — PROPOFOL 10 MG/ML
VIAL (ML) INTRAVENOUS
Status: DISCONTINUED | OUTPATIENT
Start: 2023-04-13 | End: 2023-04-13

## 2023-04-13 RX ORDER — SODIUM CHLORIDE 0.9 % (FLUSH) 0.9 %
10 SYRINGE (ML) INJECTION
Status: DISCONTINUED | OUTPATIENT
Start: 2023-04-13 | End: 2023-04-18 | Stop reason: HOSPADM

## 2023-04-13 RX ORDER — FENTANYL CITRATE 50 UG/ML
INJECTION, SOLUTION INTRAMUSCULAR; INTRAVENOUS
Status: DISCONTINUED | OUTPATIENT
Start: 2023-04-13 | End: 2023-04-13

## 2023-04-13 RX ORDER — ACETAMINOPHEN 325 MG/1
650 TABLET ORAL EVERY 6 HOURS PRN
Status: DISPENSED | OUTPATIENT
Start: 2023-04-13 | End: 2023-04-15

## 2023-04-13 RX ADMIN — SODIUM CHLORIDE 1000 ML: 9 INJECTION, SOLUTION INTRAVENOUS at 12:04

## 2023-04-13 RX ADMIN — NEOSTIGMINE METHYLSULFATE 4 MG: 0.5 INJECTION INTRAVENOUS at 12:04

## 2023-04-13 RX ADMIN — MORPHINE SULFATE 2 MG: 2 INJECTION, SOLUTION INTRAMUSCULAR; INTRAVENOUS at 12:04

## 2023-04-13 RX ADMIN — FAMOTIDINE 20 MG: 20 TABLET, FILM COATED ORAL at 08:04

## 2023-04-13 RX ADMIN — MIDAZOLAM HYDROCHLORIDE 2 MG: 1 INJECTION, SOLUTION INTRAMUSCULAR; INTRAVENOUS at 09:04

## 2023-04-13 RX ADMIN — KETOROLAC TROMETHAMINE 30 MG: 30 INJECTION, SOLUTION INTRAMUSCULAR; INTRAVENOUS at 06:04

## 2023-04-13 RX ADMIN — CIPROFLOXACIN 500 MG: 500 TABLET, FILM COATED ORAL at 09:04

## 2023-04-13 RX ADMIN — SODIUM CHLORIDE, POTASSIUM CHLORIDE, SODIUM LACTATE AND CALCIUM CHLORIDE: 600; 310; 30; 20 INJECTION, SOLUTION INTRAVENOUS at 09:04

## 2023-04-13 RX ADMIN — HYDRALAZINE HYDROCHLORIDE 10 MG: 20 INJECTION INTRAMUSCULAR; INTRAVENOUS at 05:04

## 2023-04-13 RX ADMIN — GLYCOPYRROLATE 0.2 MG: 0.2 INJECTION, SOLUTION INTRAMUSCULAR; INTRAVITREAL at 12:04

## 2023-04-13 RX ADMIN — ONDANSETRON 8 MG: 2 INJECTION INTRAMUSCULAR; INTRAVENOUS at 12:04

## 2023-04-13 RX ADMIN — PROPOFOL 200 MG: 10 INJECTION, EMULSION INTRAVENOUS at 09:04

## 2023-04-13 RX ADMIN — HYDROMORPHONE HYDROCHLORIDE 1 MG: 1 INJECTION, SOLUTION INTRAMUSCULAR; INTRAVENOUS; SUBCUTANEOUS at 12:04

## 2023-04-13 RX ADMIN — ONDANSETRON 4 MG: 2 INJECTION INTRAMUSCULAR; INTRAVENOUS at 08:04

## 2023-04-13 RX ADMIN — LABETALOL HYDROCHLORIDE 5 MG: 5 INJECTION, SOLUTION INTRAVENOUS at 04:04

## 2023-04-13 RX ADMIN — HYDROMORPHONE HYDROCHLORIDE 2 MG: 2 INJECTION, SOLUTION INTRAMUSCULAR; INTRAVENOUS; SUBCUTANEOUS at 10:04

## 2023-04-13 RX ADMIN — SODIUM CHLORIDE AND POTASSIUM CHLORIDE: 9; 2.98 INJECTION, SOLUTION INTRAVENOUS at 03:04

## 2023-04-13 RX ADMIN — CIPROFLOXACIN 500 MG: 500 TABLET, FILM COATED ORAL at 01:04

## 2023-04-13 RX ADMIN — HYDROMORPHONE HYDROCHLORIDE 1 MG: 1 INJECTION, SOLUTION INTRAMUSCULAR; INTRAVENOUS; SUBCUTANEOUS at 08:04

## 2023-04-13 RX ADMIN — FENTANYL CITRATE 100 MCG: 50 INJECTION, SOLUTION INTRAMUSCULAR; INTRAVENOUS at 09:04

## 2023-04-13 RX ADMIN — HYDROMORPHONE HYDROCHLORIDE 1 MG: 1 INJECTION, SOLUTION INTRAMUSCULAR; INTRAVENOUS; SUBCUTANEOUS at 01:04

## 2023-04-13 RX ADMIN — GLYCOPYRROLATE 0.2 MG: 0.2 INJECTION INTRAMUSCULAR; INTRAVENOUS at 08:04

## 2023-04-13 RX ADMIN — MORPHINE SULFATE 2 MG: 2 INJECTION, SOLUTION INTRAMUSCULAR; INTRAVENOUS at 07:04

## 2023-04-13 RX ADMIN — PANTOPRAZOLE SODIUM 40 MG: 40 INJECTION, POWDER, FOR SOLUTION INTRAVENOUS at 08:04

## 2023-04-13 RX ADMIN — PIPERACILLIN AND TAZOBACTAM 4.5 G: 4; .5 INJECTION, POWDER, FOR SOLUTION INTRAVENOUS; PARENTERAL at 02:04

## 2023-04-13 RX ADMIN — CEFOXITIN SODIUM 2 G: 2 POWDER, FOR SOLUTION INTRAVENOUS at 09:04

## 2023-04-13 RX ADMIN — VECURONIUM BROMIDE 7 MG: 10 INJECTION, POWDER, FOR SOLUTION INTRAVENOUS at 09:04

## 2023-04-13 RX ADMIN — HYDROMORPHONE HYDROCHLORIDE 1 MG: 1 INJECTION, SOLUTION INTRAMUSCULAR; INTRAVENOUS; SUBCUTANEOUS at 02:04

## 2023-04-13 RX ADMIN — DEXTROSE MONOHYDRATE, SODIUM CHLORIDE, AND POTASSIUM CHLORIDE: 50; 4.5; 2.98 INJECTION, SOLUTION INTRAVENOUS at 01:04

## 2023-04-13 RX ADMIN — CLONIDINE HYDROCHLORIDE 0.2 MG: 0.1 TABLET ORAL at 06:04

## 2023-04-13 RX ADMIN — ENOXAPARIN SODIUM 40 MG: 100 INJECTION SUBCUTANEOUS at 04:04

## 2023-04-13 RX ADMIN — SODIUM CHLORIDE, POTASSIUM CHLORIDE, SODIUM LACTATE AND CALCIUM CHLORIDE: 600; 310; 30; 20 INJECTION, SOLUTION INTRAVENOUS at 11:04

## 2023-04-13 RX ADMIN — LABETALOL HYDROCHLORIDE 5 MG: 5 INJECTION, SOLUTION INTRAVENOUS at 01:04

## 2023-04-13 NOTE — SUBJECTIVE & OBJECTIVE
Past Medical History:   Diagnosis Date    Hypertension        No past surgical history on file.    Review of patient's allergies indicates:  No Known Allergies    No current facility-administered medications on file prior to encounter.     Current Outpatient Medications on File Prior to Encounter   Medication Sig    ciprofloxacin HCl (CIPRO) 500 MG tablet Take 1 tablet (500 mg total) by mouth every 12 (twelve) hours. for 5 days    HYDROcodone-acetaminophen (NORCO) 5-325 mg per tablet Take 1 tablet by mouth every 6 (six) hours as needed for Pain.    ondansetron (ZOFRAN-ODT) 4 MG TbDL Take 2 tablets (8 mg total) by mouth every 8 (eight) hours as needed.     Family History    None       Tobacco Use    Smoking status: Never    Smokeless tobacco: Never   Substance and Sexual Activity    Alcohol use: Never    Drug use: Never    Sexual activity: Yes     Review of Systems   Constitutional:  Positive for fever.   HENT: Negative.     Eyes: Negative.    Respiratory: Negative.     Cardiovascular: Negative.    Gastrointestinal:  Positive for abdominal pain.   Endocrine: Negative.    Genitourinary: Negative.    Musculoskeletal: Negative.    Skin: Negative.    Allergic/Immunologic: Negative.    Neurological: Negative.    Hematological: Negative.    Psychiatric/Behavioral: Negative.     Objective:     Vital Signs (Most Recent):  Temp: 99.9 °F (37.7 °C) (04/13/23 0216)  Pulse: 107 (04/13/23 0216)  Resp: 18 (04/13/23 0244)  BP: (!) 159/112 (04/13/23 0216)  SpO2: 100 % (04/13/23 0216)   Vital Signs (24h Range):  Temp:  [99 °F (37.2 °C)-100 °F (37.8 °C)] 99.9 °F (37.7 °C)  Pulse:  [] 107  Resp:  [16-20] 18  SpO2:  [98 %-100 %] 100 %  BP: (139-159)/() 159/112     Weight: 104.3 kg (230 lb)  Body mass index is 37.12 kg/m².    Physical Exam  Constitutional:       Appearance: Normal appearance.   HENT:      Head: Normocephalic and atraumatic.      Mouth/Throat:      Pharynx: Oropharynx is clear.   Eyes:      Extraocular  Movements: Extraocular movements intact.      Conjunctiva/sclera: Conjunctivae normal.      Pupils: Pupils are equal, round, and reactive to light.   Cardiovascular:      Rate and Rhythm: Normal rate and regular rhythm.      Pulses: Normal pulses.      Heart sounds: Normal heart sounds.   Pulmonary:      Effort: Pulmonary effort is normal.      Breath sounds: Normal breath sounds.   Abdominal:      General: Abdomen is flat. Bowel sounds are normal.      Palpations: Abdomen is soft.      Tenderness: There is abdominal tenderness.      Comments: RUQ pain   Musculoskeletal:         General: Normal range of motion.      Cervical back: Normal range of motion and neck supple.   Skin:     General: Skin is warm and dry.   Neurological:      General: No focal deficit present.      Mental Status: She is alert and oriented to person, place, and time.   Psychiatric:         Mood and Affect: Mood normal.         CRANIAL NERVES     CN III, IV, VI   Pupils are equal, round, and reactive to light.     Significant Labs: All pertinent labs within the past 24 hours have been reviewed.  ABGs: No results for input(s): PH, PCO2, HCO3, POCSATURATED, BE, TOTALHB, COHB, METHB, O2HB, POCFIO2, PO2 in the last 48 hours.  Bilirubin:   Recent Labs   Lab 04/09/23 2103 04/13/23  0021   BILITOT 0.6 0.9     Blood Culture: No results for input(s): LABBLOO in the last 48 hours.  BMP:   Recent Labs   Lab 04/13/23 0021      K 2.9*   CO2 27   BUN 3.0*   CREATININE 0.70   CALCIUM 9.7     CBC:   Recent Labs   Lab 04/13/23 0021   WBC 13.7*   HGB 13.6   HCT 40.5   *     CMP:   Recent Labs   Lab 04/13/23 0021      K 2.9*   CO2 27   BUN 3.0*   CREATININE 0.70   CALCIUM 9.7   ALBUMIN 4.5   BILITOT 0.9   ALKPHOS 154*   AST 63*   ALT 61*     Cardiac Markers: No results for input(s): CKMB, MYOGLOBIN, BNP, TROPISTAT in the last 48 hours.  Coagulation: No results for input(s): PT, INR, APTT in the last 48 hours.  Lactic Acid: No results for  input(s): LACTATE in the last 48 hours.  Lipase:   Recent Labs   Lab 04/13/23  0021   LIPASE 51     Lipid Panel: No results for input(s): CHOL, HDL, LDLCALC, TRIG, CHOLHDL in the last 48 hours.  Magnesium: No results for input(s): MG in the last 48 hours.  Pathology Results  (Last 10 years)      None          POCT Glucose: No results for input(s): POCTGLUCOSE in the last 48 hours.  Prealbumin: No results for input(s): PREALBUMIN in the last 48 hours.  Respiratory Culture: No results for input(s): GSRESP, RESPIRATORYC in the last 48 hours.  Troponin: No results for input(s): TROPONINI, TROPONINIHS in the last 48 hours.  TSH: No results for input(s): TSH in the last 4320 hours.  Urine Studies:   Recent Labs   Lab 04/13/23  0045   APPEARANCEUA Clear   PROTEINUA Trace*   BILIRUBINUA Negative   UROBILINOGEN 0.2   LEUKOCYTESUR Small*   RBCUA 0-5   WBCUA 6-10*   BACTERIA 1+*       Significant Imaging:   Abd US Gall stone with Cholecystitis per ED

## 2023-04-13 NOTE — PLAN OF CARE
Problem: Adult Inpatient Plan of Care  Goal: Plan of Care Review  Outcome: Ongoing, Progressing  Goal: Patient-Specific Goal (Individualized)  Outcome: Ongoing, Progressing  Goal: Absence of Hospital-Acquired Illness or Injury  Outcome: Ongoing, Progressing  Goal: Optimal Comfort and Wellbeing  Outcome: Ongoing, Progressing  Goal: Readiness for Transition of Care  Outcome: Ongoing, Progressing     Problem: UTI (Urinary Tract Infection)  Goal: Improved Infection Symptoms  Outcome: Ongoing, Progressing     Problem: Hypertension Acute  Goal: Blood Pressure Within Desired Range  Outcome: Ongoing, Progressing

## 2023-04-13 NOTE — ED PROVIDER NOTES
Encounter Date: 4/12/2023       History     Chief Complaint   Patient presents with    Abdominal Pain     UPPER RIGHT ABD PAIN FOR ONE WEEK. CAME TO ER ONE WEEK AGO. TREATED FOR UTI, AND GIVEN SCRIPTS. HAS BEEN TAKING PRESCRIBED ABX, BUT NO RELIEF IN SYMPTOMS. DENIES PAIN/BURNING ON URINATION. REPORTS NO BM FOR ONE WEEK. TOOK DULCOLAX TAB THURS. AND HAD BOWEL MOVEMENT.     27-year-old black female returns to the emergency room complaining of continued right upper quadrant pain with associated nausea.  Patient now running a low-grade temp.  Patient denies any dysuria at the present time.  Patient is seen by me approximate 3-4 days ago in the emergency room with a normal CT scan and diagnosed with a urinary tract infection.  Patient took her antibiotics.  Patient states she can not eat because of the pain.  Patient states no jose francisco vomiting or diarrhea.  Patient states any food makes the right upper quadrant pain much worse.    The history is provided by the patient.   Review of patient's allergies indicates:  No Known Allergies  Past Medical History:   Diagnosis Date    Hypertension      History reviewed. No pertinent surgical history.  History reviewed. No pertinent family history.  Social History     Tobacco Use    Smoking status: Never    Smokeless tobacco: Never   Substance Use Topics    Alcohol use: Never    Drug use: Never     Review of Systems   Constitutional:  Positive for fever.   Gastrointestinal:  Positive for abdominal pain and nausea. Negative for diarrhea and vomiting.   All other systems reviewed and are negative.    Physical Exam     Initial Vitals [04/12/23 2313]   BP Pulse Resp Temp SpO2   (!) 147/94 (!) 121 16 100 °F (37.8 °C) 98 %      MAP       --         Physical Exam    Nursing note and vitals reviewed.  Constitutional:   Obese black female alert in mild distress secondary to pain.   HENT:   Head is atraumatic normocephalic.  Pharynx is clear with moist mucous membranes.  Nose is clear with no  discharge.   Neck:   Neck is supple with full range of motion with no cervical lymphadenopathy.   Cardiovascular:            Heart is tachycardic regular rate and rhythm with no murmur.   Pulmonary/Chest:   Lungs are clear to auscultation bilaterally.   Abdominal:   Abdomen with positive bowel sounds throughout.  Marked right upper quadrant tenderness with guarding noted.  Mild right lower quadrant tenderness with no guarding or rebound.   Musculoskeletal:      Comments: Extremities with full range of motion throughout with no clubbing cyanosis or edema.     Neurological:   Patient is alert and oriented x3.  Patient with normal sensation and strength to bilateral upper and lower extremities.   Skin:   Skin is warm and dry.   Psychiatric: She has a normal mood and affect. Her behavior is normal. Thought content normal.       ED Course   Procedures  Labs Reviewed   COMPREHENSIVE METABOLIC PANEL - Abnormal; Notable for the following components:       Result Value    Potassium Level 2.9 (*)     Glucose Level 121 (*)     Blood Urea Nitrogen 3.0 (*)     Protein Total 8.7 (*)     Globulin 4.2 (*)     Alkaline Phosphatase 154 (*)     Alanine Aminotransferase 61 (*)     Aspartate Aminotransferase 63 (*)     BUN/Creatinine Ratio 4 (*)     All other components within normal limits   URINALYSIS - Abnormal; Notable for the following components:    Protein, UA Trace (*)     Ketones, UA 40 (*)     Blood, UA Large (*)     Leukocyte Esterase, UA Small (*)     All other components within normal limits    Narrative:      URINE STABILITY IS 2 HOURS AT ROOM TEMP OR    SIX HOURS REFRIGERATED. PERFORMING TESTING ON    SPECIMENS GREATER THAN THIS AGE MAY AFFECT THE    FOLLOWING TESTS:    PH          SPECIFIC GRAVITY           BLOOD    CLARITY     BILIRUBIN               UROBILINOGEN   CBC WITH DIFFERENTIAL - Abnormal; Notable for the following components:    WBC 13.7 (*)     RBC 5.18 (*)     MCV 78.2 (*)     MCH 26.3 (*)     Platelet 562  (*)     MPV 8.9 (*)     Neut % 78.9 (*)     Lymph % 14.3 (*)     Eos % 0.5 (*)     Neut # 10.76 (*)     Mono # 0.81 (*)     All other components within normal limits   URINALYSIS, MICROSCOPIC - Abnormal; Notable for the following components:    Bacteria, UA 1+ (*)     WBC, UA 6-10 (*)     Squamous Epithelial Cells, UA Moderate (*)     All other components within normal limits   CBC WITH DIFFERENTIAL - Abnormal; Notable for the following components:    WBC 12.5 (*)     MCV 77.6 (*)     MCH 25.2 (*)     Platelet 516 (*)     MPV 8.6 (*)     Neut % 77.2 (*)     Lymph % 15.3 (*)     Eos % 0.5 (*)     Neut # 9.66 (*)     Mono # 0.80 (*)     IG# 0.05 (*)     All other components within normal limits   COMPREHENSIVE METABOLIC PANEL - Abnormal; Notable for the following components:    Potassium Level 3.1 (*)     Glucose Level 143 (*)     Blood Urea Nitrogen 2.0 (*)     Creatinine 0.57 (*)     Alanine Aminotransferase 57 (*)     Aspartate Aminotransferase 52 (*)     BUN/Creatinine Ratio 4 (*)     All other components within normal limits   LIPASE - Normal   HCG QUALITATIVE URINE - Normal   SARS-COV-2 RNA AMPLIFICATION, QUAL - Normal   BLOOD CULTURE [facility]   CULTURE, URINE   CBC W/ AUTO DIFFERENTIAL    Narrative:     The following orders were created for panel order CBC auto differential.  Procedure                               Abnormality         Status                     ---------                               -----------         ------                     CBC with Differential[[id number]]        Abnormal            Final result                 Please view results for these tests on the individual orders.   CBC W/ AUTO DIFFERENTIAL    Narrative:     The following orders were created for panel order CBC auto differential.  Procedure                               Abnormality         Status                     ---------                               -----------         ------                     CBC with Differential[[id number]]         Abnormal            Final result                 Please view results for these tests on the individual orders.          Imaging Results              US Abdomen Limited (Final result)  Result time 04/13/23 04:41:33      Final result by Tyshawn Reynolds III, MD (04/13/23 04:41:33)                   Impression:      1. Cholelithiasis with changes of cholecystitis as described above.  See above comments.      Electronically signed by: Tyshawn Reynolds  Date:    04/13/2023  Time:    04:41               Narrative:    EXAMINATION:  STUDY: US ABDOMEN LIMITED    CLINICAL HISTORY AND TECHNIQUE:  Reji Lopez RT on 4/13/2023  2:32 AM    ER PT    Past Medical History:    Technique: US ABD LIMITED WITH COLOR AND DOPPLER    CV:    Current Clinical History: X SEVERAL DAYS  RUQ AND RLQ ABD PN  NAUSEA    Technologist:GREGG    COMPARISON:  None    FINDINGS:  Liver: The hepatic parenchyma appears unremarkable with no worrisome focal masses or dilated biliary radicals.  Duplex imaging demonstrates adequate antegrade flow and waveforms within the portal vein, hepatic artery, and hepatic veins.    Gallbladder/biliary System: The gallbladder is adequately distended with multiple small stones noted layering dependently, thickening of the gallbladder wall (3.5 mm) and significant tenderness while scanning over the gallbladder.  There is no evidence of pericholecystic edema.The common bile duct, where visualized, is not dilated nor do I see evidence of intraductal stones.    Miscellaneous: N/A                                       Medications   iopamidoL (ISOVUE-370) injection 100 mL (has no administration in time range)   HYDROmorphone injection 1 mg (1 mg Intravenous Given 4/13/23 2009)   sodium chloride 0.9% flush 10 mL (has no administration in time range)   melatonin tablet 6 mg (has no administration in time range)   enoxaparin injection 40 mg (40 mg Subcutaneous Given 4/13/23 1609)   pantoprazole injection 40 mg (40 mg Intravenous Given  4/13/23 0844)   ondansetron injection 4 mg (4 mg Intravenous Given 4/13/23 2009)   0.9 % NaCl with KCl 40 mEq infusion ( Intravenous New Bag 4/14/23 0007)   ceFOXItin (MEFOXIN) 2 g in dextrose 5 % in water (D5W) 5 % 50 mL IVPB (MB+) (2 g Intravenous Canceled Entry 4/13/23 0959)   ciprofloxacin HCl tablet 500 mg (500 mg Oral Given 4/13/23 2109)   acetaminophen tablet 650 mg (has no administration in time range)   cloNIDine tablet 0.2 mg (0.2 mg Oral Given 4/13/23 1800)   hydrALAZINE injection 10 mg (10 mg Intravenous Given 4/13/23 1749)   0.9%  NaCl infusion (1,000 mLs Intravenous New Bag 4/13/23 0013)   ondansetron injection 8 mg (8 mg Intravenous Given 4/13/23 0014)   HYDROmorphone injection 1 mg (1 mg Intravenous Given 4/13/23 0014)   dextrose 5 % and 0.45 % NaCl with KCl 40 mEq infusion ( Intravenous New Bag 4/13/23 0110)   piperacillin-tazobactam (ZOSYN) 4.5 g in dextrose 5 % in water (D5W) 5 % 100 mL IVPB (MB+) (0 g Intravenous Stopped 4/13/23 0315)   HYDROmorphone injection 1 mg (1 mg Intravenous Given 4/13/23 0244)   glycopyrrolate injection 0.2 mg (0.2 mg Intravenous Given 4/13/23 0844)   famotidine tablet 20 mg (20 mg Oral Given 4/13/23 0844)   midazolam (VERSED) 1 mg/mL injection 2 mg (2 mg Intravenous Given 4/13/23 0932)   labetaloL injection 20 mg (5 mg Intravenous Given 4/13/23 1315)   labetalol 20 mg/4 mL (5 mg/mL) IV syring (5 mg Intravenous Given 4/13/23 1610)   ketorolac injection 30 mg (30 mg Intravenous Given 4/13/23 1803)     Medical Decision Making:   History:   I obtained history from: someone other than patient.  Initial Assessment:   Acute cholecystitis  Differential Diagnosis:   Biliary colic, pyelonephritis, pancreatitis, gastroenteritis,  ED Management:  Patient given IV Dilaudid and Zofran as well as a L of saline on admission.  Patient was noted to have a low potassium so started D5 1/2 normal saline with 40 mEq potassium chloride at 100 cc/hour.  Patient had ultrasound of her  gallbladder which showed multiple gallstones and acute cholecystitis.  Will consult hospitalist for admission for surgery consult.                        Clinical Impression:   Final diagnoses:  [K80.00] Acute cholecystitis due to biliary calculus (Primary)        ED Disposition Condition    Admit Stable                Roe Fine MD  04/14/23 0356

## 2023-04-13 NOTE — PROGRESS NOTES
[facility]/Surg  [facility] Medicine  Progress Note    Patient Name: Tony Ruggiero  MRN: [id number]  Patient Class: IP- Inpatient   Admission Date: 4/12/2023  Length of Stay: 0 days  Attending Physician: Kathy Esposito MD  Primary Care Provider: RUFINO Anderson        Subjective:     Principal Problem:<principal problem not specified>        HPI:  26 yo female with no sig PMH presents with 5 days of abd pain, RUQ, recently seen and treated for UTI returns with pain, fever. NO chest pain, SOB.           Overview/Hospital Course:  04/13/223 pt had surgery this am spoke with DR. Krishna pt with large prurulent component, placed CLYDE drain and will needs at least 24 hr of iv abx, pt c/o severe pain, MS not helping, gave dilauded, some help, pt still with significnat pain.      Interval History:       Review of Systems   Unable to perform ROS: Acuity of condition   Objective:     Vital Signs (Most Recent):  Temp: 97.9 °F (36.6 °C) (04/13/23 1415)  Pulse: 94 (04/13/23 1415)  Resp: 20 (04/13/23 1415)  BP: (!) 156/99 (04/13/23 1415)  SpO2: 95 % (04/13/23 1415)   Vital Signs (24h Range):  Temp:  [97.1 °F (36.2 °C)-100 °F (37.8 °C)] 97.9 °F (36.6 °C)  Pulse:  [] 94  Resp:  [16-20] 20  SpO2:  [93 %-100 %] 95 %  BP: (139-165)/() 156/99     Weight: 107.7 kg (237 lb 8 oz)  Body mass index is 38.33 kg/m².    Intake/Output Summary (Last 24 hours) at 4/13/2023 1450  Last data filed at 4/13/2023 1242  Gross per 24 hour   Intake 1275 ml   Output --   Net 1275 ml      Physical Exam  Constitutional:       General: She is in acute distress.      Appearance: Normal appearance. She is obese. She is ill-appearing.   Cardiovascular:      Rate and Rhythm: Normal rate and regular rhythm.      Pulses: Normal pulses.      Heart sounds: Normal heart sounds.   Pulmonary:      Effort: Pulmonary effort is normal.      Breath sounds: Normal breath sounds.   Abdominal:      Palpations: Abdomen is soft.       Tenderness: There is no abdominal tenderness (post op tenderness).   Skin:     Capillary Refill: Capillary refill takes less than 2 seconds.      Findings: No lesion or rash.   Neurological:      General: No focal deficit present.      Mental Status: She is alert.      Comments: Sedated from pain meds, minimally arousable, mom at bedside       Significant Labs: All pertinent labs within the past 24 hours have been reviewed.  CBC:   Recent Labs   Lab 04/13/23 0021 04/13/23 0416   WBC 13.7* 12.5*   HGB 13.6 12.5   HCT 40.5 38.5   * 516*     CMP:   Recent Labs   Lab 04/13/23 0021 04/13/23 0416    137   K 2.9* 3.1*   CO2 27 30   BUN 3.0* 2.0*   CREATININE 0.70 0.57*   CALCIUM 9.7 8.6   ALBUMIN 4.5 3.8   BILITOT 0.9 0.9   ALKPHOS 154* 137   AST 63* 52*   ALT 61* 57*       Significant Imaging: I have reviewed all pertinent imaging results/findings within the past 24 hours.      Assessment/Plan:      Acute cholecystitis  Admit for iv abx  S/p lap alejandrina        VTE Risk Mitigation (From admission, onward)         Ordered     enoxaparin injection 40 mg  Daily         04/13/23 0423     IP VTE HIGH RISK PATIENT  Once         04/13/23 0423     Place sequential compression device  Until discontinued         04/13/23 0423                Discharge Planning   MIAH:      Code Status: Full Code   Is the patient medically ready for discharge?:     Reason for patient still in hospital (select all that apply): Patient trending condition, Treatment and Consult recommendations  Discharge Plan A: Home with family                  Kathy Esposito MD  Department of Hospital Medicine   [facility]

## 2023-04-13 NOTE — PLAN OF CARE
04/13/23 0908   Discharge Assessment   Assessment Type Discharge Planning Assessment   Confirmed/corrected address, phone number and insurance Yes   Confirmed Demographics Correct on Facesheet   Source of Information patient   When was your last doctors appointment?   (has not seen new PCP)   Communicated MIAH with patient/caregiver Yes   Reason For Admission gallbladder   People in Home child(jose g), dependent   Facility Arrived From: home   Do you expect to return to your current living situation? Yes   Do you have help at home or someone to help you manage your care at home? No   Prior to hospitilization cognitive status: Alert/Oriented   Current cognitive status: Alert/Oriented   Home Accessibility wheelchair accessible   Home Layout Able to live on 1st floor   Equipment Currently Used at Home none   Readmission within 30 days? No   Patient currently being followed by outpatient case management? No   Do you currently have service(s) that help you manage your care at home? No   Do you take prescription medications? Yes   Do you have prescription coverage? Yes   Coverage [employer]   Do you have any problems affording any of your prescribed medications? No   Is the patient taking medications as prescribed? yes   Who is going to help you get home at discharge? mother   How do you get to doctors appointments? family or friend will provide;car, drives self   Are you on dialysis? No   Do you take coumadin? No   Discharge Plan A Home with family   DME Needed Upon Discharge  none   Discharge Plan discussed with: Patient   Discharge Barriers Identified None   Physical Activity   On average, how many days per week do you engage in moderate to strenuous exercise (like a brisk walk)? 0 days   On average, how many minutes do you engage in exercise at this level? 0 min   Financial Resource Strain   How hard is it for you to pay for the very basics like food, housing, medical care, and heating? Not hard   Housing Stability   In the  last 12 months, was there a time when you were not able to pay the mortgage or rent on time? N   In the last 12 months, how many places have you lived? 1   In the last 12 months, was there a time when you did not have a steady place to sleep or slept in a shelter (including now)? N   Transportation Needs   In the past 12 months, has lack of transportation kept you from medical appointments or from getting medications? no   In the past 12 months, has lack of transportation kept you from meetings, work, or from getting things needed for daily living? No   Food Insecurity   Within the past 12 months, you worried that your food would run out before you got the money to buy more. Never true   Within the past 12 months, the food you bought just didn't last and you didn't have money to get more. Never true   Stress   Do you feel stress - tense, restless, nervous, or anxious, or unable to sleep at night because your mind is troubled all the time - these days? Not at all   Social Connections   In a typical week, how many times do you talk on the phone with family, friends, or neighbors? More than 3   How often do you get together with friends or relatives? More than 3   How often do you attend Taoism or Jehovah's witness services? Never  (clergy services offered and declined)   Do you belong to any clubs or organizations such as Taoism groups, unions, fraternal or athletic groups, or school groups? No   How often do you attend meetings of the clubs or organizations you belong to? Never   Are you , , , , never , or living with a partner? Never marrie   Alcohol Use   Q1: How often do you have a drink containing alcohol? Never   Q2: How many drinks containing alcohol do you have on a typical day when you are drinking? None   Q3: How often do you have six or more drinks on one occasion? Never

## 2023-04-13 NOTE — OP NOTE
Procedure date:  04/13/2023      Indications:  27-year-old female presenting to the emergency department with complaint of right upper quadrant pain discomfort ongoing for over 1 week found to have clinical and imaging evidence most consistent with acute cholecystitis secondary to cholelithiasis undergoing urgent cholecystectomy.      Preoperative diagnosis:  Acute cholecystitis on chronic cholecystitis  Postoperative diagnosis:  Severe acute on chronic cholecystitis secondary to cholelithiasis    Procedure performed:  Laparoscopic cholecystectomy with lysis of adhesion and intra-abdominal drain placement.        Procedure in detail:  Patient was brought to the operative theater laid in a supine position general endotracheal intubation anesthesia was provided.  Preoperative antibiotics administered.  There the abdomen was then sterilely prepped and draped in normal surgical fashion using chlorhexidine.  1% lidocaine with epinephrine infiltrate the subcutaneous tissues overlying the supraumbilical region.  Fifteen blade was used to incise the skin with dissection down the fatty tissues.  Veress needle technique was used into the abdomen on the 1st pass and insufflated to 15 mm of mercury pressure using CO2 gas.  A 5 mm Visiport trocar was then introduced to the same region.  Entry into the abdomen was made without injury to the internal viscera.  A secondary dissection trocar was then placed 2 in the right hypogastrium 1 in the subxiphoid region all 5 mm in size.  Gallbladder was identified it was grossly edematous and impossible to grasp.  It was firmly adherent to the omentum and surrounding structures.  Cholecystostomy was then performed for decompression.  Patient having hydrops without bile leakage and there was purulence noted.  Gallbladder was grasped and elevated superiorly the infundibulum of the gallbladder was grossly inflamed and attached to all surrounding structures.  Careful meticulous sharp and blunt  dissection was then performed surrounding this region.  The junction of the neck and the cystic duct was able to be dissected free.  Because of the severe induration top-down technique was then used for dissecting the gallbladder down to the most indurated region.  Starting at the midbody and working our way down we are able to identify the cystic and neck junction.  Beyond this point was to indurated and appeared to be firmly adherent to all surrounding structures including the duodenum.  For these reasons further dissection was performed in this area.  I did feel that the safest approach would be for transection of this region.  We used a blue load 65 stapler.  There was appropriate transection of the duct this region.  The area of the duct was then also reinforced with bio glue.  There was no bile leakage noted.  There was no significant seepage of blood.  The cystic artery was able to be completely dissected and was doubly clipped and transected.  The gallbladder was then dissected free of the liver bed of the superior aspect and placed in the Endo-Catch bag and retrieved from the subxiphoid port site.  A 7 Maltese drain was then placed in the right quadrant port site and placed within the gallbladder fossa for suctioning of purulence encountered within the gallbladder as well as monitoring for any bile seepage.  There appeared to be appropriate hemostasis and no bile leakage within the liver bed or the cystic remnant.  Secondary dissection trocars were then removed sequentially and the trocar sites were closed with 0 Vicryl in the level of the fascia and 4-0 Monocryl subcuticular stitch.  A sterile dressing placed upon the wound sites.  The patient was then relieved of anesthesia stable condition and transferred to postanesthesia care unit.      Complications:  None   Estimated blood loss:  10 cc   Specimens:  Gallbladder    Disposition:  Upon recovery from anesthesia patient will be transferred back to the  floor for further monitoring and recovery.  Upon full recovery patient will be discharged to home with follow up in surgery Clinic in 1 week.    Екатерина Krishna MD

## 2023-04-13 NOTE — ANESTHESIA PROCEDURE NOTES
Intubation    Date/Time: 4/13/2023 9:54 AM  Performed by: Dagoberto Schwartz CRNA  Authorized by: Dagoberto Schwartz CRNA     Intubation:     Induction:  Inhalational - mask    Intubated:  Postinduction    Mask Ventilation:  Easy mask    Attempts:  1    Attempted By:  CRNA    Method of Intubation:  Direct    Blade:  Smith 2    Laryngeal View Grade: Grade I - full view of cords      Difficult Airway Encountered?: No      Complications:  None    Airway Device:  Oral endotracheal tube    Airway Device Size:  7.5    Style/Cuff Inflation:  Cuffed    Inflation Amount (mL):  3    Tube secured:  22    Secured at:  The lips    Placement Verified By:  Capnometry    Complicating Factors:  None    Findings Post-Intubation:  BS equal bilateral

## 2023-04-13 NOTE — H&P
[facility]  [facility] Medicine  History & Physical    Patient Name: Tony Ruggiero  MRN: [id number]  Patient Class: IP- Inpatient  Admission Date: 4/12/2023  Attending Physician: Jordon Pascual MD  Primary Care Provider: RUFINO Anderson         Patient information was obtained from via telemed    Subjective:     Principal Problem:<principal problem not specified>    Chief Complaint:   Chief Complaint   Patient presents with    Abdominal Pain     UPPER RIGHT ABD PAIN FOR ONE WEEK. CAME TO ER ONE WEEK AGO. TREATED FOR UTI, AND GIVEN SCRIPTS. HAS BEEN TAKING PRESCRIBED ABX, BUT NO RELIEF IN SYMPTOMS. DENIES PAIN/BURNING ON URINATION. REPORTS NO BM FOR ONE WEEK. TOOK DULCOLAX TAB THURS. AND HAD BOWEL MOVEMENT.        HPI: 28 yo female with no sig PMH presents with 5 days of abd pain, RUQ, recently seen and treated for UTI returns with pain, fever. NO chest pain, SOB.           Past Medical History:   Diagnosis Date    Hypertension        No past surgical history on file.    Review of patient's allergies indicates:  No Known Allergies    No current facility-administered medications on file prior to encounter.     Current Outpatient Medications on File Prior to Encounter   Medication Sig    ciprofloxacin HCl (CIPRO) 500 MG tablet Take 1 tablet (500 mg total) by mouth every 12 (twelve) hours. for 5 days    HYDROcodone-acetaminophen (NORCO) 5-325 mg per tablet Take 1 tablet by mouth every 6 (six) hours as needed for Pain.    ondansetron (ZOFRAN-ODT) 4 MG TbDL Take 2 tablets (8 mg total) by mouth every 8 (eight) hours as needed.     Family History    None       Tobacco Use    Smoking status: Never    Smokeless tobacco: Never   Substance and Sexual Activity    Alcohol use: Never    Drug use: Never    Sexual activity: Yes     Review of Systems   Constitutional:  Positive for fever.   HENT: Negative.     Eyes: Negative.    Respiratory: Negative.     Cardiovascular: Negative.     Gastrointestinal:  Positive for abdominal pain.   Endocrine: Negative.    Genitourinary: Negative.    Musculoskeletal: Negative.    Skin: Negative.    Allergic/Immunologic: Negative.    Neurological: Negative.    Hematological: Negative.    Psychiatric/Behavioral: Negative.     Objective:     Vital Signs (Most Recent):  Temp: 99.9 °F (37.7 °C) (04/13/23 0216)  Pulse: 107 (04/13/23 0216)  Resp: 18 (04/13/23 0244)  BP: (!) 159/112 (04/13/23 0216)  SpO2: 100 % (04/13/23 0216)   Vital Signs (24h Range):  Temp:  [99 °F (37.2 °C)-100 °F (37.8 °C)] 99.9 °F (37.7 °C)  Pulse:  [] 107  Resp:  [16-20] 18  SpO2:  [98 %-100 %] 100 %  BP: (139-159)/() 159/112     Weight: 104.3 kg (230 lb)  Body mass index is 37.12 kg/m².    Physical Exam  Constitutional:       Appearance: Normal appearance.   HENT:      Head: Normocephalic and atraumatic.      Mouth/Throat:      Pharynx: Oropharynx is clear.   Eyes:      Extraocular Movements: Extraocular movements intact.      Conjunctiva/sclera: Conjunctivae normal.      Pupils: Pupils are equal, round, and reactive to light.   Cardiovascular:      Rate and Rhythm: Normal rate and regular rhythm.      Pulses: Normal pulses.      Heart sounds: Normal heart sounds.   Pulmonary:      Effort: Pulmonary effort is normal.      Breath sounds: Normal breath sounds.   Abdominal:      General: Abdomen is flat. Bowel sounds are normal.      Palpations: Abdomen is soft.      Tenderness: There is abdominal tenderness.      Comments: RUQ pain   Musculoskeletal:         General: Normal range of motion.      Cervical back: Normal range of motion and neck supple.   Skin:     General: Skin is warm and dry.   Neurological:      General: No focal deficit present.      Mental Status: She is alert and oriented to person, place, and time.   Psychiatric:         Mood and Affect: Mood normal.         CRANIAL NERVES     CN III, IV, VI   Pupils are equal, round, and reactive to light.     Significant Labs:  All pertinent labs within the past 24 hours have been reviewed.  ABGs: No results for input(s): PH, PCO2, HCO3, POCSATURATED, BE, TOTALHB, COHB, METHB, O2HB, POCFIO2, PO2 in the last 48 hours.  Bilirubin:   Recent Labs   Lab 04/09/23  2103 04/13/23  0021   BILITOT 0.6 0.9     Blood Culture: No results for input(s): LABBLOO in the last 48 hours.  BMP:   Recent Labs   Lab 04/13/23 0021      K 2.9*   CO2 27   BUN 3.0*   CREATININE 0.70   CALCIUM 9.7     CBC:   Recent Labs   Lab 04/13/23 0021   WBC 13.7*   HGB 13.6   HCT 40.5   *     CMP:   Recent Labs   Lab 04/13/23 0021      K 2.9*   CO2 27   BUN 3.0*   CREATININE 0.70   CALCIUM 9.7   ALBUMIN 4.5   BILITOT 0.9   ALKPHOS 154*   AST 63*   ALT 61*     Cardiac Markers: No results for input(s): CKMB, MYOGLOBIN, BNP, TROPISTAT in the last 48 hours.  Coagulation: No results for input(s): PT, INR, APTT in the last 48 hours.  Lactic Acid: No results for input(s): LACTATE in the last 48 hours.  Lipase:   Recent Labs   Lab 04/13/23  0021   LIPASE 51     Lipid Panel: No results for input(s): CHOL, HDL, LDLCALC, TRIG, CHOLHDL in the last 48 hours.  Magnesium: No results for input(s): MG in the last 48 hours.  Pathology Results  (Last 10 years)      None          POCT Glucose: No results for input(s): POCTGLUCOSE in the last 48 hours.  Prealbumin: No results for input(s): PREALBUMIN in the last 48 hours.  Respiratory Culture: No results for input(s): GSRESP, RESPIRATORYC in the last 48 hours.  Troponin: No results for input(s): TROPONINI, TROPONINIHS in the last 48 hours.  TSH: No results for input(s): TSH in the last 4320 hours.  Urine Studies:   Recent Labs   Lab 04/13/23  0045   APPEARANCEUA Clear   PROTEINUA Trace*   BILIRUBINUA Negative   UROBILINOGEN 0.2   LEUKOCYTESUR Small*   RBCUA 0-5   WBCUA 6-10*   BACTERIA 1+*       Significant Imaging:   Abd US Gall stone with Cholecystitis per ED    Assessment/Plan:     No notes have been filed under this  hospital service.  Service: Hospital Medicine    VTE Risk Mitigation (From admission, onward)    None         - Sepsis due to acute cholecystitis, Bcx, Pain control, and Zosyn IV. Surgery consult  -Hypokalemia add KCL supplement to Fluids  -DVT ppx on SCD   Pt is Full code, SDM is her mother Betty Ruggiero  Pt is seen and examined by me via telemed. Pt is in [facility]. I am in [place], [place]. Nursing staff assisted with patient evaluation. Software is Audio/ Video.   Pt is being admitted as an inpatient to the Hospitalist service to evaluate and treat          Jordon Pascual MD  Department of Hospital Medicine  [facility]

## 2023-04-13 NOTE — SUBJECTIVE & OBJECTIVE
Interval History:       Review of Systems   Unable to perform ROS: Acuity of condition   Objective:     Vital Signs (Most Recent):  Temp: 97.9 °F (36.6 °C) (04/13/23 1415)  Pulse: 94 (04/13/23 1415)  Resp: 20 (04/13/23 1415)  BP: (!) 156/99 (04/13/23 1415)  SpO2: 95 % (04/13/23 1415)   Vital Signs (24h Range):  Temp:  [97.1 °F (36.2 °C)-100 °F (37.8 °C)] 97.9 °F (36.6 °C)  Pulse:  [] 94  Resp:  [16-20] 20  SpO2:  [93 %-100 %] 95 %  BP: (139-165)/() 156/99     Weight: 107.7 kg (237 lb 8 oz)  Body mass index is 38.33 kg/m².    Intake/Output Summary (Last 24 hours) at 4/13/2023 1450  Last data filed at 4/13/2023 1242  Gross per 24 hour   Intake 1275 ml   Output --   Net 1275 ml      Physical Exam  Constitutional:       General: She is in acute distress.      Appearance: Normal appearance. She is obese. She is ill-appearing.   Cardiovascular:      Rate and Rhythm: Normal rate and regular rhythm.      Pulses: Normal pulses.      Heart sounds: Normal heart sounds.   Pulmonary:      Effort: Pulmonary effort is normal.      Breath sounds: Normal breath sounds.   Abdominal:      Palpations: Abdomen is soft.      Tenderness: There is no abdominal tenderness (post op tenderness).   Skin:     Capillary Refill: Capillary refill takes less than 2 seconds.      Findings: No lesion or rash.   Neurological:      General: No focal deficit present.      Mental Status: She is alert.      Comments: Sedated from pain meds, minimally arousable, mom at bedside       Significant Labs: All pertinent labs within the past 24 hours have been reviewed.  CBC:   Recent Labs   Lab 04/13/23 0021 04/13/23 0416   WBC 13.7* 12.5*   HGB 13.6 12.5   HCT 40.5 38.5   * 516*     CMP:   Recent Labs   Lab 04/13/23 0021 04/13/23 0416    137   K 2.9* 3.1*   CO2 27 30   BUN 3.0* 2.0*   CREATININE 0.70 0.57*   CALCIUM 9.7 8.6   ALBUMIN 4.5 3.8   BILITOT 0.9 0.9   ALKPHOS 154* 137   AST 63* 52*   ALT 61* 57*       Significant Imaging:  I have reviewed all pertinent imaging results/findings within the past 24 hours.

## 2023-04-13 NOTE — ANESTHESIA PREPROCEDURE EVALUATION
04/13/2023  Tony Ruggiero is a 27 y.o., female.      Pre-op Assessment    I have reviewed the Patient Summary Reports.     I have reviewed the Nursing Notes. I have reviewed the NPO Status.   I have reviewed the Medications.     Review of Systems  Anesthesia Hx:  No problems with previous Anesthesia  Denies Family Hx of Anesthesia complications.   Denies Personal Hx of Anesthesia complications.   Hematology/Oncology:  Hematology Normal   Oncology Normal     EENT/Dental:EENT/Dental Normal   Cardiovascular:   Exercise tolerance: good Hypertension, well controlled    Pulmonary:  Pulmonary Normal    Renal/:  Renal/ Normal     Hepatic/GI:   GERD    Musculoskeletal:  Musculoskeletal Normal    Neurological:  Neurology Normal    Endocrine:  Endocrine Normal  Obesity / BMI > 30  Dermatological:  Skin Normal    Psych:  Psychiatric Normal           Physical Exam  General: Well nourished, Cooperative, Alert and Oriented    Airway:  Mallampati: III / II  Mouth Opening: Normal  TM Distance: Normal  Tongue: Normal  Neck ROM: Normal ROM    Dental:  Intact        Anesthesia Plan  Type of Anesthesia, risks & benefits discussed:    Anesthesia Type: Gen ETT  Intra-op Monitoring Plan: Standard ASA Monitors  Post Op Pain Control Plan: multimodal analgesia  Induction:  IV  Airway Plan: Direct  Informed Consent: Informed consent signed with the Patient and all parties understand the risks and agree with anesthesia plan.  All questions answered. Patient consented to blood products? Yes  ASA Score: 2  Day of Surgery Review of History & Physical: H&P Update referred to the surgeon/provider.I have interviewed and examined the patient. I have reviewed the patient's H&P dated: There are no significant changes.     Ready For Surgery From Anesthesia Perspective.     .       VACCINE ADMINISTRATION RECORD  PARENT / GUARDIAN APPROVAL  Date: 2022  Vaccine administered to: Cate Paredes     : 2006    MRN: [id number]    A copy of the appropriate Centers for Disease Control and Prevention Vaccine Information statement has been provided. I have read or have had explained the information about the diseases and the vaccines listed below. There was an opportunity to ask questions and any questions were answered satisfactorily. I believe that I understand the benefits and risks of the vaccine cited and ask that the vaccine(s) listed below be given to me or to the person named above (for whom I am authorized to make this request). VACCINES ADMINISTERED:  IVP      I have read and hereby agree to be bound by the terms of this agreement as stated above. My signature is valid until revoked by me in writing. This document is signed by , relationship: Mother on 2022.:                                                                                                     2022                      Parent / She Heft                                                Date    Kash Brown served as a witness to authentication that the identity of the person signing electronically is in fact the person represented as signing. This document was generated by Kash Brown on 2022.

## 2023-04-13 NOTE — ANESTHESIA POSTPROCEDURE EVALUATION
Anesthesia Post Evaluation    Patient: Tony Ruggiero    Procedure(s) Performed: Procedure(s) (LRB):  CHOLECYSTECTOMY, LAPAROSCOPIC (N/A)    Final Anesthesia Type: general      Patient location during evaluation: PACU  Patient participation: No - Unable to Participate, Sedation  Post-procedure vital signs: reviewed and stable  Pain management: adequate  Airway patency: patent    PONV status at discharge: No PONV  Anesthetic complications: no      Cardiovascular status: stable  Respiratory status: spontaneous ventilation  Hydration status: euvolemic  Follow-up not needed.          Vitals Value Taken Time   /95 04/13/23 0444   Temp 36.6 °C (97.8 °F) 04/13/23 0444   Pulse 93 04/13/23 0742   Resp 16 04/13/23 0742   SpO2 98 % 04/13/23 0742         No case tracking events are documented in the log.      Pain/Vimal Score: Pain Rating Prior to Med Admin: 7 (4/13/2023  7:25 AM)  Pain Rating Post Med Admin: 5 (4/13/2023  7:55 AM)

## 2023-04-13 NOTE — HOSPITAL COURSE
04/13/223 pt had surgery this am spoke with DR. Krishna pt with large prurulent component, placed CLYDE drain and will needs at least 24 hr of iv abx, pt c/o severe pain, MS not helping, gave dilauded, some help, pt still with significnat pain.  04/14/2023 pt up and ambulating + Flatus and BM x2 still c/o pain  BP has been elevated.  Pt reports h/o severe preecclampsia about 11 months ago during deliver, BP was high post delivery, but has come down, not currently on meds, + family h/o HTN.  04/15/2023 pt with fever overnight pain is improving.  On zosyn iv and cipro po for UTI.  04/16/2023 temp 100.7 ovenight pain is improving.  Continues on iv zosyn, cipro po for UTI prior to admit.  04/17/2023 pt with T 100.5 overnight around 1900 no fever this am, BP still elevated despite prn meds

## 2023-04-14 PROBLEM — I10 HTN (HYPERTENSION): Status: ACTIVE | Noted: 2023-04-14

## 2023-04-14 PROBLEM — N39.0 UTI (URINARY TRACT INFECTION): Status: ACTIVE | Noted: 2023-04-14

## 2023-04-14 PROBLEM — E87.6 HYPOKALEMIA: Status: ACTIVE | Noted: 2023-04-14

## 2023-04-14 LAB
ANION GAP SERPL CALC-SCNC: 3 MEQ/L (ref 2–13)
BUN SERPL-MCNC: 3 MG/DL (ref 7–20)
CALCIUM SERPL-MCNC: 8.1 MG/DL (ref 8.4–10.2)
CHLORIDE SERPL-SCNC: 104 MMOL/L (ref 98–110)
CO2 SERPL-SCNC: 28 MMOL/L (ref 21–32)
CREAT SERPL-MCNC: 0.59 MG/DL (ref 0.66–1.25)
CREAT/UREA NIT SERPL: 5 (ref 12–20)
ERYTHROCYTE [DISTWIDTH] IN BLOOD BY AUTOMATED COUNT: 13.7 % (ref 11–14.5)
GFR SERPLBLD CREATININE-BSD FMLA CKD-EPI: >90 MLS/MIN/1.73/M2
GLUCOSE SERPL-MCNC: 110 MG/DL (ref 70–115)
HCT VFR BLD AUTO: 35.7 % (ref 36–48)
HGB BLD-MCNC: 11.7 G/DL (ref 11.8–16)
MCH RBC QN AUTO: 25.9 PG (ref 27–34)
MCV RBC AUTO: 79.2 FL (ref 79–99)
MEAN CELL HEMOGLOBIN CONCENTRATION (OHS) G/DL: 32.8 G/DL (ref 31–37)
NRBC BLD AUTO-RTO: 0 % (ref 0–1)
PLATELET # BLD AUTO: 507 X10(3)/MCL (ref 140–371)
PMV BLD AUTO: 9.3 FL (ref 9.4–12.4)
POTASSIUM SERPL-SCNC: 3.8 MMOL/L (ref 3.5–5.1)
RBC # BLD AUTO: 4.51 X10(6)/MCL (ref 4–5.1)
SODIUM SERPL-SCNC: 135 MMOL/L (ref 135–145)
WBC # SPEC AUTO: 14.9 X10(3)/MCL (ref 4–11.5)

## 2023-04-14 PROCEDURE — 63600175 PHARM REV CODE 636 W HCPCS: Performed by: SURGERY

## 2023-04-14 PROCEDURE — 21400001 HC TELEMETRY ROOM

## 2023-04-14 PROCEDURE — 80048 BASIC METABOLIC PNL TOTAL CA: CPT | Performed by: SURGERY

## 2023-04-14 PROCEDURE — 63600175 PHARM REV CODE 636 W HCPCS: Performed by: INTERNAL MEDICINE

## 2023-04-14 PROCEDURE — 25000003 PHARM REV CODE 250: Performed by: FAMILY MEDICINE

## 2023-04-14 PROCEDURE — 85027 COMPLETE CBC AUTOMATED: CPT | Performed by: SURGERY

## 2023-04-14 PROCEDURE — 94761 N-INVAS EAR/PLS OXIMETRY MLT: CPT

## 2023-04-14 PROCEDURE — 63600175 PHARM REV CODE 636 W HCPCS: Performed by: FAMILY MEDICINE

## 2023-04-14 PROCEDURE — 25000003 PHARM REV CODE 250: Performed by: SURGERY

## 2023-04-14 PROCEDURE — 25000003 PHARM REV CODE 250: Performed by: INTERNAL MEDICINE

## 2023-04-14 PROCEDURE — C9113 INJ PANTOPRAZOLE SODIUM, VIA: HCPCS | Performed by: SURGERY

## 2023-04-14 PROCEDURE — 36415 COLL VENOUS BLD VENIPUNCTURE: CPT | Performed by: SURGERY

## 2023-04-14 RX ORDER — METOPROLOL SUCCINATE 50 MG/1
50 TABLET, EXTENDED RELEASE ORAL NIGHTLY
Status: DISCONTINUED | OUTPATIENT
Start: 2023-04-14 | End: 2023-04-16

## 2023-04-14 RX ORDER — LABETALOL HCL 20 MG/4 ML
10 SYRINGE (ML) INTRAVENOUS EVERY 4 HOURS PRN
Status: DISCONTINUED | OUTPATIENT
Start: 2023-04-14 | End: 2023-04-18 | Stop reason: HOSPADM

## 2023-04-14 RX ORDER — IBUPROFEN 400 MG/1
400 TABLET ORAL ONCE
Status: COMPLETED | OUTPATIENT
Start: 2023-04-14 | End: 2023-04-14

## 2023-04-14 RX ORDER — METOPROLOL SUCCINATE 50 MG/1
50 TABLET, EXTENDED RELEASE ORAL DAILY
Status: DISCONTINUED | OUTPATIENT
Start: 2023-04-14 | End: 2023-04-14

## 2023-04-14 RX ADMIN — SODIUM CHLORIDE AND POTASSIUM CHLORIDE: 9; 2.98 INJECTION, SOLUTION INTRAVENOUS at 12:04

## 2023-04-14 RX ADMIN — HYDROMORPHONE HYDROCHLORIDE 1 MG: 1 INJECTION, SOLUTION INTRAMUSCULAR; INTRAVENOUS; SUBCUTANEOUS at 06:04

## 2023-04-14 RX ADMIN — PIPERACILLIN AND TAZOBACTAM 4.5 G: 4; .5 INJECTION, POWDER, FOR SOLUTION INTRAVENOUS; PARENTERAL at 08:04

## 2023-04-14 RX ADMIN — LABETALOL HYDROCHLORIDE 10 MG: 5 INJECTION, SOLUTION INTRAVENOUS at 10:04

## 2023-04-14 RX ADMIN — ACETAMINOPHEN 650 MG: 325 TABLET ORAL at 07:04

## 2023-04-14 RX ADMIN — CIPROFLOXACIN 500 MG: 500 TABLET, FILM COATED ORAL at 08:04

## 2023-04-14 RX ADMIN — SODIUM CHLORIDE AND POTASSIUM CHLORIDE: 9; 2.98 INJECTION, SOLUTION INTRAVENOUS at 08:04

## 2023-04-14 RX ADMIN — HYDROMORPHONE HYDROCHLORIDE 1 MG: 1 INJECTION, SOLUTION INTRAMUSCULAR; INTRAVENOUS; SUBCUTANEOUS at 01:04

## 2023-04-14 RX ADMIN — ENOXAPARIN SODIUM 40 MG: 100 INJECTION SUBCUTANEOUS at 04:04

## 2023-04-14 RX ADMIN — CLONIDINE HYDROCHLORIDE 0.2 MG: 0.1 TABLET ORAL at 11:04

## 2023-04-14 RX ADMIN — CLONIDINE HYDROCHLORIDE 0.2 MG: 0.1 TABLET ORAL at 08:04

## 2023-04-14 RX ADMIN — HYDROMORPHONE HYDROCHLORIDE 1 MG: 1 INJECTION, SOLUTION INTRAMUSCULAR; INTRAVENOUS; SUBCUTANEOUS at 07:04

## 2023-04-14 RX ADMIN — HYDRALAZINE HYDROCHLORIDE 10 MG: 20 INJECTION INTRAMUSCULAR; INTRAVENOUS at 07:04

## 2023-04-14 RX ADMIN — IBUPROFEN 400 MG: 400 TABLET ORAL at 10:04

## 2023-04-14 RX ADMIN — METOPROLOL SUCCINATE 50 MG: 50 TABLET, EXTENDED RELEASE ORAL at 08:04

## 2023-04-14 RX ADMIN — PANTOPRAZOLE SODIUM 40 MG: 40 INJECTION, POWDER, FOR SOLUTION INTRAVENOUS at 08:04

## 2023-04-14 NOTE — SUBJECTIVE & OBJECTIVE
Interval History:       Review of Systems   Unable to perform ROS: Acuity of condition   Objective:     Vital Signs (Most Recent):  Temp: 97.8 °F (36.6 °C) (04/14/23 0701)  Pulse: 110 (04/14/23 1050)  Resp: 18 (04/14/23 1050)  BP: (!) 150/100 (04/14/23 1050)  SpO2: 100 % (04/14/23 0855)   Vital Signs (24h Range):  Temp:  [97.1 °F (36.2 °C)-99.4 °F (37.4 °C)] 97.8 °F (36.6 °C)  Pulse:  [] 110  Resp:  [18-20] 18  SpO2:  [94 %-100 %] 100 %  BP: (129-178)/() 150/100     Weight: 107.6 kg (237 lb 3.2 oz)  Body mass index is 38.29 kg/m².    Intake/Output Summary (Last 24 hours) at 4/14/2023 1348  Last data filed at 4/14/2023 0612  Gross per 24 hour   Intake 2198 ml   Output 925 ml   Net 1273 ml        Physical Exam  Constitutional:       General: She is in acute distress.      Appearance: Normal appearance. She is obese. She is ill-appearing.   Cardiovascular:      Rate and Rhythm: Normal rate and regular rhythm.      Pulses: Normal pulses.      Heart sounds: Normal heart sounds.   Pulmonary:      Effort: Pulmonary effort is normal.      Breath sounds: Normal breath sounds.   Abdominal:      Palpations: Abdomen is soft.      Tenderness: There is no abdominal tenderness (post op tenderness).   Skin:     Capillary Refill: Capillary refill takes less than 2 seconds.      Findings: No lesion or rash.   Neurological:      General: No focal deficit present.      Mental Status: She is alert.      Comments: Sedated from pain meds, minimally arousable, mom at bedside       Significant Labs: All pertinent labs within the past 24 hours have been reviewed.  CBC:   Recent Labs   Lab 04/13/23 0416 04/13/23 2018 04/14/23  0454   WBC 12.5* 18.1* 14.9*   HGB 12.5 12.2 11.7*   HCT 38.5 37.3 35.7*   * 502* 507*       CMP:   Recent Labs   Lab 04/13/23  0021 04/13/23 0416 04/13/23 2018 04/14/23  0454    137 136 135   K 2.9* 3.1* 3.6 3.8   CO2 27 30 25 28   BUN 3.0* 2.0* 2.0* 3.0*   CREATININE 0.70 0.57* 0.56* 0.59*    CALCIUM 9.7 8.6 8.7 8.1*   ALBUMIN 4.5 3.8 3.4  --    BILITOT 0.9 0.9 0.8  --    ALKPHOS 154* 137 123  --    AST 63* 52* 65*  --    ALT 61* 57* 64*  --          Significant Imaging: I have reviewed all pertinent imaging results/findings within the past 24 hours.

## 2023-04-14 NOTE — ASSESSMENT & PLAN NOTE
Patient has hypokalemia which is currently controlled. Last electrolytes reviewed- Recent Labs   Lab 04/13/23  0416 04/13/23 2018 04/14/23  0454   K 3.1* 3.6 3.8   . Will replace potassium and monitor electrolytes closely.

## 2023-04-14 NOTE — PLAN OF CARE
Problem: Adult Inpatient Plan of Care  Goal: Plan of Care Review  Outcome: Ongoing, Progressing  Goal: Patient-Specific Goal (Individualized)  Outcome: Ongoing, Progressing  Goal: Absence of Hospital-Acquired Illness or Injury  Outcome: Ongoing, Progressing  Goal: Optimal Comfort and Wellbeing  Outcome: Ongoing, Progressing  Goal: Readiness for Transition of Care  Outcome: Ongoing, Progressing     Problem: UTI (Urinary Tract Infection)  Goal: Improved Infection Symptoms  Outcome: Ongoing, Progressing     Problem: Fall Injury Risk  Goal: Absence of Fall and Fall-Related Injury  Outcome: Ongoing, Progressing     Problem: Bleeding (Cholecystectomy)  Goal: Absence of Bleeding  Outcome: Ongoing, Progressing     Problem: Fluid and Electrolyte Imbalance (Cholecystectomy)  Goal: Fluid and Electrolyte Balance  Outcome: Ongoing, Progressing     Problem: Infection (Cholecystectomy)  Goal: Absence of Infection Signs and Symptoms  Outcome: Ongoing, Progressing     Problem: Ongoing Anesthesia Effects (Cholecystectomy)  Goal: Anesthesia/Sedation Recovery  Outcome: Ongoing, Progressing     Problem: Postoperative Urinary Retention (Cholecystectomy)  Goal: Effective Urinary Elimination  Outcome: Ongoing, Progressing     Problem: Respiratory Compromise (Cholecystectomy)  Goal: Effective Oxygenation and Ventilation  Outcome: Ongoing, Progressing

## 2023-04-14 NOTE — NURSING
Spoke with Dr. Kirshna, informed MD of patient's CBC and CMP results as well as current vital signs, MD voices understanding, no new telephone orders received.

## 2023-04-14 NOTE — PROGRESS NOTES
[facility]/Surg  [facility] Medicine  Progress Note    Patient Name: Tony Ruggiero  MRN: [id number]  Patient Class: IP- Inpatient   Admission Date: 4/12/2023  Length of Stay: 1 days  Attending Physician: Kathy Esposito MD  Primary Care Provider: RUFINO Anderson        Subjective:     Principal Problem:<principal problem not specified>        HPI:  28 yo female with no sig PMH presents with 5 days of abd pain, RUQ, recently seen and treated for UTI returns with pain, fever. NO chest pain, SOB.           Overview/Hospital Course:  04/13/223 pt had surgery this am spoke with DR. Krishna pt with large prurulent component, placed CLYDE drain and will needs at least 24 hr of iv abx, pt c/o severe pain, MS not helping, gave dilauded, some help, pt still with significnat pain.  04/14/2023 pt up and ambulating + Flatus and BM x2 still c/o pain  BP has been elevated.  Pt reports h/o severe preecclampsia about 11 months ago during deliver, BP was high post delivery, but has come down, not currently on meds, + family h/o HTN.      Interval History:       Review of Systems   Unable to perform ROS: Acuity of condition   Objective:     Vital Signs (Most Recent):  Temp: 97.8 °F (36.6 °C) (04/14/23 0701)  Pulse: 110 (04/14/23 1050)  Resp: 18 (04/14/23 1050)  BP: (!) 150/100 (04/14/23 1050)  SpO2: 100 % (04/14/23 0855)   Vital Signs (24h Range):  Temp:  [97.1 °F (36.2 °C)-99.4 °F (37.4 °C)] 97.8 °F (36.6 °C)  Pulse:  [] 110  Resp:  [18-20] 18  SpO2:  [94 %-100 %] 100 %  BP: (129-178)/() 150/100     Weight: 107.6 kg (237 lb 3.2 oz)  Body mass index is 38.29 kg/m².    Intake/Output Summary (Last 24 hours) at 4/14/2023 1348  Last data filed at 4/14/2023 0612  Gross per 24 hour   Intake 2198 ml   Output 925 ml   Net 1273 ml        Physical Exam  Constitutional:       General: She is in acute distress.      Appearance: Normal appearance. She is obese. She is ill-appearing.   Cardiovascular:      Rate  and Rhythm: Normal rate and regular rhythm.      Pulses: Normal pulses.      Heart sounds: Normal heart sounds.   Pulmonary:      Effort: Pulmonary effort is normal.      Breath sounds: Normal breath sounds.   Abdominal:      Palpations: Abdomen is soft.      Tenderness: There is no abdominal tenderness (post op tenderness).   Skin:     Capillary Refill: Capillary refill takes less than 2 seconds.      Findings: No lesion or rash.   Neurological:      General: No focal deficit present.      Mental Status: She is alert.      Comments: Sedated from pain meds, minimally arousable, mom at bedside       Significant Labs: All pertinent labs within the past 24 hours have been reviewed.  CBC:   Recent Labs   Lab 04/13/23 0416 04/13/23 2018 04/14/23  0454   WBC 12.5* 18.1* 14.9*   HGB 12.5 12.2 11.7*   HCT 38.5 37.3 35.7*   * 502* 507*       CMP:   Recent Labs   Lab 04/13/23  0021 04/13/23 0416 04/13/23 2018 04/14/23  0454    137 136 135   K 2.9* 3.1* 3.6 3.8   CO2 27 30 25 28   BUN 3.0* 2.0* 2.0* 3.0*   CREATININE 0.70 0.57* 0.56* 0.59*   CALCIUM 9.7 8.6 8.7 8.1*   ALBUMIN 4.5 3.8 3.4  --    BILITOT 0.9 0.9 0.8  --    ALKPHOS 154* 137 123  --    AST 63* 52* 65*  --    ALT 61* 57* 64*  --          Significant Imaging: I have reviewed all pertinent imaging results/findings within the past 24 hours.      Assessment/Plan:      Hypokalemia  Patient has hypokalemia which is currently controlled. Last electrolytes reviewed- Recent Labs   Lab 04/13/23 0416 04/13/23 2018 04/14/23  0454   K 3.1* 3.6 3.8   . Will replace potassium and monitor electrolytes closely.         HTN (hypertension)  May be due to pain/anesthesia  Will add low dose metoprolol  Suspect underlying Chronic HTN      Acute cholecystitis  Admit for iv abx  S/p lap alejandrina        VTE Risk Mitigation (From admission, onward)         Ordered     enoxaparin injection 40 mg  Daily         04/13/23 0423     IP VTE HIGH RISK PATIENT  Once         04/13/23  0423     Place sequential compression device  Until discontinued         04/13/23 0423                Discharge Planning   MIAH:      Code Status: Full Code   Is the patient medically ready for discharge?:     Reason for patient still in hospital (select all that apply): Patient trending condition and Treatment  Discharge Plan A: Home with family                  Kathy Esposito MD  Department of Hospital Medicine   [facility]/S[facility]

## 2023-04-14 NOTE — NURSING
Received telephone call from Dr. Krishna requesting update on patient. Current vitals and patient's current c/o Right Quadrant ABD Pain reviewed with MD who voiced understanding, New telephone orders received for STAT Labs.

## 2023-04-15 PROBLEM — A41.9 SEPSIS: Status: ACTIVE | Noted: 2023-04-15

## 2023-04-15 PROBLEM — R65.10 SIRS (SYSTEMIC INFLAMMATORY RESPONSE SYNDROME): Status: ACTIVE | Noted: 2023-04-15

## 2023-04-15 PROBLEM — A41.9 SEPSIS: Status: RESOLVED | Noted: 2023-04-15 | Resolved: 2023-04-15

## 2023-04-15 LAB
ALBUMIN SERPL-MCNC: 2.8 G/DL (ref 3.4–5)
ALBUMIN/GLOB SERPL: 0.8 RATIO
ALP SERPL-CCNC: 108 UNIT/L (ref 50–144)
ALT SERPL-CCNC: 42 UNIT/L (ref 1–45)
ANION GAP SERPL CALC-SCNC: 5 MEQ/L (ref 2–13)
ANION GAP SERPL CALC-SCNC: 6 MEQ/L (ref 2–13)
AST SERPL-CCNC: 56 UNIT/L (ref 14–36)
B PERT.PT PRMT NPH QL NAA+NON-PROBE: NOT DETECTED
BACTERIA UR CULT: NORMAL
BILIRUBIN DIRECT+TOT PNL SERPL-MCNC: 0.9 MG/DL (ref 0–1)
BUN SERPL-MCNC: 4 MG/DL (ref 7–20)
BUN SERPL-MCNC: 5 MG/DL (ref 7–20)
C PNEUM DNA NPH QL NAA+NON-PROBE: NOT DETECTED
CALCIUM SERPL-MCNC: 8.2 MG/DL (ref 8.4–10.2)
CALCIUM SERPL-MCNC: 8.4 MG/DL (ref 8.4–10.2)
CHLORIDE SERPL-SCNC: 106 MMOL/L (ref 98–110)
CHLORIDE SERPL-SCNC: 109 MMOL/L (ref 98–110)
CO2 SERPL-SCNC: 18 MMOL/L (ref 21–32)
CO2 SERPL-SCNC: 25 MMOL/L (ref 21–32)
CREAT SERPL-MCNC: 0.57 MG/DL (ref 0.66–1.25)
CREAT SERPL-MCNC: 0.58 MG/DL (ref 0.66–1.25)
CREAT/UREA NIT SERPL: 7 (ref 12–20)
CREAT/UREA NIT SERPL: 9 (ref 12–20)
ERYTHROCYTE [DISTWIDTH] IN BLOOD BY AUTOMATED COUNT: 14 % (ref 11–14.5)
FLUAV H1 2009 PAN RNA NPH NAA+NON-PROBE: NORMAL
FLUAV H1 RNA NPH QL NAA+NON-PROBE: NORMAL
FLUAV H3 RNA NPH QL NAA+NON-PROBE: NORMAL
FLUAV RNA NPH QL NAA+NON-PROBE: NOT DETECTED
FLUAV RNA RESP QL NAA+PROBE: NORMAL
FLUBV RNA NPH QL NAA+NON-PROBE: NOT DETECTED
GFR SERPLBLD CREATININE-BSD FMLA CKD-EPI: >90 MLS/MIN/1.73/M2
GFR SERPLBLD CREATININE-BSD FMLA CKD-EPI: >90 MLS/MIN/1.73/M2
GLOBULIN SER-MCNC: 3.4 GM/DL (ref 2–3.9)
GLUCOSE SERPL-MCNC: 107 MG/DL (ref 70–115)
GLUCOSE SERPL-MCNC: 118 MG/DL (ref 70–115)
HADV DNA NPH QL NAA+NON-PROBE: NOT DETECTED
HCOV 229E RNA NPH QL NAA+NON-PROBE: NOT DETECTED
HCOV HKU1 RNA NPH QL NAA+NON-PROBE: NOT DETECTED
HCOV NL63 RNA NPH QL NAA+NON-PROBE: NOT DETECTED
HCOV OC43 RNA NPH QL NAA+NON-PROBE: NOT DETECTED
HCT VFR BLD AUTO: 31.6 % (ref 36–48)
HGB BLD-MCNC: 10.3 G/DL (ref 11.8–16)
HMPV RNA NPH QL NAA+NON-PROBE: NOT DETECTED
HPIV1 RNA NPH QL NAA+NON-PROBE: NOT DETECTED
HPIV2 RNA NPH QL NAA+NON-PROBE: NOT DETECTED
HPIV3 RNA NPH QL NAA+NON-PROBE: NOT DETECTED
HPIV4 RNA NPH QL NAA+NON-PROBE: NOT DETECTED
M PNEUMO DNA NPH QL NAA+NON-PROBE: NOT DETECTED
MCH RBC QN AUTO: 25.9 PG (ref 27–34)
MCV RBC AUTO: 79.6 FL (ref 79–99)
MEAN CELL HEMOGLOBIN CONCENTRATION (OHS) G/DL: 32.6 G/DL (ref 31–37)
NRBC BLD AUTO-RTO: 0 % (ref 0–1)
PLATELET # BLD AUTO: 471 X10(3)/MCL (ref 140–371)
PMV BLD AUTO: 9.1 FL (ref 9.4–12.4)
POTASSIUM SERPL-SCNC: 4.1 MMOL/L (ref 3.5–5.1)
POTASSIUM SERPL-SCNC: 4.2 MMOL/L (ref 3.5–5.1)
PROT SERPL-MCNC: 6.2 GM/DL (ref 6.3–8.2)
RBC # BLD AUTO: 3.97 X10(6)/MCL (ref 4–5.1)
RSV RNA NPH QL NAA+NON-PROBE: NOT DETECTED
RSV RNA NPH QL NAA+NON-PROBE: NOT DETECTED
RV+EV RNA NPH QL NAA+NON-PROBE: NOT DETECTED
SARS-COV-2 RNA RESP QL NAA+PROBE: NOT DETECTED
SODIUM SERPL-SCNC: 133 MMOL/L (ref 135–145)
SODIUM SERPL-SCNC: 136 MMOL/L (ref 135–145)
WBC # SPEC AUTO: 11.9 X10(3)/MCL (ref 4–11.5)

## 2023-04-15 PROCEDURE — 80048 BASIC METABOLIC PNL TOTAL CA: CPT | Performed by: SURGERY

## 2023-04-15 PROCEDURE — 94760 N-INVAS EAR/PLS OXIMETRY 1: CPT

## 2023-04-15 PROCEDURE — 85027 COMPLETE CBC AUTOMATED: CPT | Performed by: SURGERY

## 2023-04-15 PROCEDURE — 25000003 PHARM REV CODE 250: Performed by: SURGERY

## 2023-04-15 PROCEDURE — 87798 DETECT AGENT NOS DNA AMP: CPT | Performed by: SURGERY

## 2023-04-15 PROCEDURE — 63600175 PHARM REV CODE 636 W HCPCS: Performed by: SURGERY

## 2023-04-15 PROCEDURE — 36415 COLL VENOUS BLD VENIPUNCTURE: CPT | Performed by: SURGERY

## 2023-04-15 PROCEDURE — 21400001 HC TELEMETRY ROOM

## 2023-04-15 PROCEDURE — 63600175 PHARM REV CODE 636 W HCPCS: Performed by: INTERNAL MEDICINE

## 2023-04-15 PROCEDURE — 94761 N-INVAS EAR/PLS OXIMETRY MLT: CPT

## 2023-04-15 PROCEDURE — 80053 COMPREHEN METABOLIC PANEL: CPT | Performed by: SURGERY

## 2023-04-15 PROCEDURE — 25000003 PHARM REV CODE 250: Performed by: FAMILY MEDICINE

## 2023-04-15 PROCEDURE — 25000003 PHARM REV CODE 250: Performed by: INTERNAL MEDICINE

## 2023-04-15 RX ORDER — HYDROCODONE BITARTRATE AND ACETAMINOPHEN 10; 325 MG/1; MG/1
1 TABLET ORAL EVERY 6 HOURS PRN
Status: DISCONTINUED | OUTPATIENT
Start: 2023-04-15 | End: 2023-04-15

## 2023-04-15 RX ORDER — IBUPROFEN 600 MG/1
600 TABLET ORAL EVERY 6 HOURS PRN
Status: DISCONTINUED | OUTPATIENT
Start: 2023-04-15 | End: 2023-04-18 | Stop reason: HOSPADM

## 2023-04-15 RX ORDER — HYDROCODONE BITARTRATE AND ACETAMINOPHEN 5; 325 MG/1; MG/1
1 TABLET ORAL EVERY 6 HOURS PRN
Status: DISCONTINUED | OUTPATIENT
Start: 2023-04-15 | End: 2023-04-15

## 2023-04-15 RX ORDER — HYDROCODONE BITARTRATE AND ACETAMINOPHEN 5; 325 MG/1; MG/1
1 TABLET ORAL EVERY 6 HOURS PRN
Status: DISCONTINUED | OUTPATIENT
Start: 2023-04-15 | End: 2023-04-18

## 2023-04-15 RX ADMIN — ENOXAPARIN SODIUM 40 MG: 100 INJECTION SUBCUTANEOUS at 04:04

## 2023-04-15 RX ADMIN — IBUPROFEN 600 MG: 600 TABLET, FILM COATED ORAL at 03:04

## 2023-04-15 RX ADMIN — HYDROMORPHONE HYDROCHLORIDE 1 MG: 1 INJECTION, SOLUTION INTRAMUSCULAR; INTRAVENOUS; SUBCUTANEOUS at 01:04

## 2023-04-15 RX ADMIN — PIPERACILLIN AND TAZOBACTAM 4.5 G: 4; .5 INJECTION, POWDER, FOR SOLUTION INTRAVENOUS; PARENTERAL at 01:04

## 2023-04-15 RX ADMIN — HYDROCODONE BITARTRATE AND ACETAMINOPHEN 1 TABLET: 5; 325 TABLET ORAL at 09:04

## 2023-04-15 RX ADMIN — PIPERACILLIN AND TAZOBACTAM 4.5 G: 4; .5 INJECTION, POWDER, FOR SOLUTION INTRAVENOUS; PARENTERAL at 04:04

## 2023-04-15 RX ADMIN — CLONIDINE HYDROCHLORIDE 0.2 MG: 0.1 TABLET ORAL at 04:04

## 2023-04-15 RX ADMIN — SODIUM CHLORIDE AND POTASSIUM CHLORIDE: 9; 2.98 INJECTION, SOLUTION INTRAVENOUS at 03:04

## 2023-04-15 RX ADMIN — CLONIDINE HYDROCHLORIDE 0.2 MG: 0.1 TABLET ORAL at 07:04

## 2023-04-15 RX ADMIN — METOPROLOL SUCCINATE 50 MG: 50 TABLET, EXTENDED RELEASE ORAL at 09:04

## 2023-04-15 RX ADMIN — HYDROCODONE BITARTRATE AND ACETAMINOPHEN 1 TABLET: 5; 325 TABLET ORAL at 11:04

## 2023-04-15 RX ADMIN — IBUPROFEN 600 MG: 600 TABLET, FILM COATED ORAL at 10:04

## 2023-04-15 RX ADMIN — SODIUM CHLORIDE AND POTASSIUM CHLORIDE: 9; 2.98 INJECTION, SOLUTION INTRAVENOUS at 01:04

## 2023-04-15 RX ADMIN — PIPERACILLIN AND TAZOBACTAM 4.5 G: 4; .5 INJECTION, POWDER, FOR SOLUTION INTRAVENOUS; PARENTERAL at 09:04

## 2023-04-15 NOTE — PROGRESS NOTES
[facility]/Surg  General Surgery  Progress Note    Subjective:     Interval History:  Postop day 2 from laparoscopic cholecystectomy for severe acute on chronic cholecystitis.  Patient developing a fever spike overnight.  She denies worsening abdominal pain discomfort.  Currently maintaining normal bowel function.  Denies nausea at this time.  States that she still has an appetite.  Due to the acute severity of the initial infection antibiotics will be continued inpatient will be monitored.  Post-Op Info:  Procedure(s) (LRB):  CHOLECYSTECTOMY, LAPAROSCOPIC (N/A)   2 Days Post-Op      Medications:  Continuous Infusions:   0.9% NACL & POTASSIUM CHLORIDE 40 MEQ/L 100 mL/hr at 04/15/23 0141     Scheduled Meds:   enoxaparin  40 mg Subcutaneous Daily    metoprolol succinate  50 mg Oral QHS    piperacillin-tazobactam (ZOSYN) IVPB  4.5 g Intravenous Q8H     PRN Meds:cloNIDine, HYDROcodone-acetaminophen, iopamidoL, labetalol, melatonin, ondansetron, sodium chloride 0.9%     Objective:     Vital Signs (Most Recent):  Temp: 97.4 °F (36.3 °C) (04/15/23 1000)  Pulse: 93 (04/15/23 1000)  Resp: 20 (04/15/23 1000)  BP: (!) 136/95 (04/15/23 1000)  SpO2: 98 % (04/15/23 1000)   Vital Signs (24h Range):  Temp:  [97.4 °F (36.3 °C)-102.7 °F (39.3 °C)] 97.4 °F (36.3 °C)  Pulse:  [] 93  Resp:  [18-20] 20  SpO2:  [98 %-100 %] 98 %  BP: (132-187)/() 136/95       Intake/Output Summary (Last 24 hours) at 4/15/2023 1137  Last data filed at 4/15/2023 0830  Gross per 24 hour   Intake 2601.67 ml   Output 5 ml   Net 2596.67 ml       Physical Exam  Abdominal:      General: Abdomen is flat. Bowel sounds are normal. There is no distension.      Palpations: Abdomen is soft. There is no mass.      Tenderness: There is abdominal tenderness. There is no right CVA tenderness, left CVA tenderness, guarding or rebound.      Hernia: No hernia is present.      Comments: Appropriate tenderness to palpation, CLYDE drain is functioning  appropriately with thin serosanguineous output in no changes concerning for bilious or enteric contents       Significant Labs:  CBC:   Recent Labs   Lab 04/15/23  0539   WBC 11.9*   RBC 3.97*   HGB 10.3*   HCT 31.6*   *   MCV 79.6   MCH 25.9*   MCHC 32.6     CMP:   Recent Labs   Lab 04/15/23  0854   CALCIUM 8.2*   ALBUMIN 2.8*   *   K 4.1   CO2 18*   BUN 4.0*   CREATININE 0.57*   ALKPHOS 108   ALT 42   AST 56*   BILITOT 0.9       Significant Diagnostics:  None    Assessment/Plan:     Active Diagnoses:    Diagnosis Date Noted POA    PRINCIPAL PROBLEM:  Acute cholecystitis [K81.0] 04/13/2023 Yes    HTN (hypertension) [I10] 04/14/2023 Yes    Hypokalemia [E87.6] 04/14/2023 Yes    UTI (urinary tract infection) [N39.0] 04/14/2023 Yes      Problems Resolved During this Admission:     Plan to continue with IV antibiotic therapy for treatment of source of intra-abdominal sepsis secondary to acute cholecystitis.  Liver function tests remain within normal range and improving-plan to continue CLYDE drain monitoring  Maintain current liquid diet at this time until tomorrow.    Екатерина Krishna MD  General Surgery  [facility]

## 2023-04-15 NOTE — SUBJECTIVE & OBJECTIVE
Interval History:       Review of Systems   Unable to perform ROS: Acuity of condition   Objective:     Vital Signs (Most Recent):  Temp: 97.4 °F (36.3 °C) (04/15/23 1000)  Pulse: 93 (04/15/23 1000)  Resp: 20 (04/15/23 1139)  BP: (!) 136/95 (04/15/23 1000)  SpO2: 98 % (04/15/23 1000)   Vital Signs (24h Range):  Temp:  [97.4 °F (36.3 °C)-102.7 °F (39.3 °C)] 97.4 °F (36.3 °C)  Pulse:  [] 93  Resp:  [18-20] 20  SpO2:  [98 %-100 %] 98 %  BP: (136-187)/() 136/95     Weight: 108.8 kg (239 lb 12.8 oz)  Body mass index is 38.7 kg/m².    Intake/Output Summary (Last 24 hours) at 4/15/2023 1500  Last data filed at 4/15/2023 0830  Gross per 24 hour   Intake 2601.67 ml   Output 5 ml   Net 2596.67 ml        Physical Exam  Constitutional:       General: She is in acute distress.      Appearance: Normal appearance. She is obese. She is ill-appearing.   Cardiovascular:      Rate and Rhythm: Normal rate and regular rhythm.      Pulses: Normal pulses.      Heart sounds: Normal heart sounds.   Pulmonary:      Effort: Pulmonary effort is normal.      Breath sounds: Normal breath sounds.   Abdominal:      Palpations: Abdomen is soft.      Tenderness: There is no abdominal tenderness (post op tenderness).   Skin:     Capillary Refill: Capillary refill takes less than 2 seconds.      Findings: No lesion or rash.   Neurological:      General: No focal deficit present.      Mental Status: She is alert.      Comments: Sedated from pain meds, minimally arousable, mom at bedside       Significant Labs: All pertinent labs within the past 24 hours have been reviewed.  CBC:   Recent Labs   Lab 04/13/23 2018 04/14/23  0454 04/15/23  0539   WBC 18.1* 14.9* 11.9*   HGB 12.2 11.7* 10.3*   HCT 37.3 35.7* 31.6*   * 507* 471*       CMP:   Recent Labs   Lab 04/13/23 2018 04/14/23  0454 04/15/23  0539 04/15/23  0854    135 136 133*   K 3.6 3.8 4.2 4.1   CO2 25 28 25 18*   BUN 2.0* 3.0* 5.0* 4.0*   CREATININE 0.56* 0.59* 0.58*  0.57*   CALCIUM 8.7 8.1* 8.4 8.2*   ALBUMIN 3.4  --   --  2.8*   BILITOT 0.8  --   --  0.9   ALKPHOS 123  --   --  108   AST 65*  --   --  56*   ALT 64*  --   --  42         Significant Imaging: I have reviewed all pertinent imaging results/findings within the past 24 hours.

## 2023-04-15 NOTE — PROGRESS NOTES
[facility]/Surg  [facility] Medicine  Progress Note    Patient Name: Tony Ruggiero  MRN: [id number]  Patient Class: IP- Inpatient   Admission Date: 4/12/2023  Length of Stay: 2 days  Attending Physician: Kathy Esposito MD  Primary Care Provider: RUFINO Anderson        Subjective:     Principal Problem:Acute cholecystitis        HPI:  28 yo female with no sig PMH presents with 5 days of abd pain, RUQ, recently seen and treated for UTI returns with pain, fever. NO chest pain, SOB.           Overview/Hospital Course:  04/13/223 pt had surgery this am spoke with DR. Krishna pt with large prurulent component, placed CLYDE drain and will needs at least 24 hr of iv abx, pt c/o severe pain, MS not helping, gave dilauded, some help, pt still with significnat pain.  04/14/2023 pt up and ambulating + Flatus and BM x2 still c/o pain  BP has been elevated.  Pt reports h/o severe preecclampsia about 11 months ago during deliver, BP was high post delivery, but has come down, not currently on meds, + family h/o HTN.  04/15/2023 pt with fever overnight pain is improving.  On zosyn iv and cipro po for UTI.      Interval History:       Review of Systems   Unable to perform ROS: Acuity of condition   Objective:     Vital Signs (Most Recent):  Temp: 97.4 °F (36.3 °C) (04/15/23 1000)  Pulse: 93 (04/15/23 1000)  Resp: 20 (04/15/23 1139)  BP: (!) 136/95 (04/15/23 1000)  SpO2: 98 % (04/15/23 1000)   Vital Signs (24h Range):  Temp:  [97.4 °F (36.3 °C)-102.7 °F (39.3 °C)] 97.4 °F (36.3 °C)  Pulse:  [] 93  Resp:  [18-20] 20  SpO2:  [98 %-100 %] 98 %  BP: (136-187)/() 136/95     Weight: 108.8 kg (239 lb 12.8 oz)  Body mass index is 38.7 kg/m².    Intake/Output Summary (Last 24 hours) at 4/15/2023 1500  Last data filed at 4/15/2023 0830  Gross per 24 hour   Intake 2601.67 ml   Output 5 ml   Net 2596.67 ml        Physical Exam  Constitutional:       General: She is in acute distress.      Appearance: Normal  appearance. She is obese. She is ill-appearing.   Cardiovascular:      Rate and Rhythm: Normal rate and regular rhythm.      Pulses: Normal pulses.      Heart sounds: Normal heart sounds.   Pulmonary:      Effort: Pulmonary effort is normal.      Breath sounds: Normal breath sounds.   Abdominal:      Palpations: Abdomen is soft.      Tenderness: There is no abdominal tenderness (post op tenderness).   Skin:     Capillary Refill: Capillary refill takes less than 2 seconds.      Findings: No lesion or rash.   Neurological:      General: No focal deficit present.      Mental Status: She is alert.      Comments: Sedated from pain meds, minimally arousable, mom at bedside       Significant Labs: All pertinent labs within the past 24 hours have been reviewed.  CBC:   Recent Labs   Lab 04/13/23 2018 04/14/23  0454 04/15/23  0539   WBC 18.1* 14.9* 11.9*   HGB 12.2 11.7* 10.3*   HCT 37.3 35.7* 31.6*   * 507* 471*       CMP:   Recent Labs   Lab 04/13/23 2018 04/14/23  0454 04/15/23  0539 04/15/23  0854    135 136 133*   K 3.6 3.8 4.2 4.1   CO2 25 28 25 18*   BUN 2.0* 3.0* 5.0* 4.0*   CREATININE 0.56* 0.59* 0.58* 0.57*   CALCIUM 8.7 8.1* 8.4 8.2*   ALBUMIN 3.4  --   --  2.8*   BILITOT 0.8  --   --  0.9   ALKPHOS 123  --   --  108   AST 65*  --   --  56*   ALT 64*  --   --  42         Significant Imaging: I have reviewed all pertinent imaging results/findings within the past 24 hours.      Assessment/Plan:      * Acute cholecystitis  Admit for iv abx  S/p lap alejandrina      SIRS (systemic inflammatory response syndrome)  Due to choelycystitis  Iv abx contiue  Blood cx done      HTN (hypertension)  May be due to pain/anesthesia  Will add low dose metoprolol  Suspect underlying Chronic HTN      Hypokalemia  Patient has hypokalemia which is currently controlled. Last electrolytes reviewed- Recent Labs   Lab 04/13/23  0416 04/13/23 2018 04/14/23  0454   K 3.1* 3.6 3.8   . Will replace potassium and monitor electrolytes  closely.         UTI (urinary tract infection)  + e coli on cx   Continue cipro        VTE Risk Mitigation (From admission, onward)         Ordered     enoxaparin injection 40 mg  Daily         04/13/23 0423     IP VTE HIGH RISK PATIENT  Once         04/13/23 0423     Place sequential compression device  Until discontinued         04/13/23 0423                Discharge Planning   MIAH:      Code Status: Full Code   Is the patient medically ready for discharge?:     Reason for patient still in hospital (select all that apply): Patient trending condition and Treatment  Discharge Plan A: Home with family                  Kathy Esposito MD  Department of Hospital Medicine   [facility]/Surg

## 2023-04-15 NOTE — NURSING
PT HAS FEVER .9,  PT BLOOD PRESSURE IS ELEVATED, PT HR , CALLED HOSPITAL IST NEW ORDERS ARE GIVEN SEE CHART

## 2023-04-15 NOTE — PLAN OF CARE
Problem: Adult Inpatient Plan of Care  Goal: Plan of Care Review  Outcome: Ongoing, Progressing  Flowsheets (Taken 4/14/2023 2118)  Plan of Care Reviewed With: patient  Goal: Patient-Specific Goal (Individualized)  Outcome: Ongoing, Progressing  Goal: Absence of Hospital-Acquired Illness or Injury  Outcome: Ongoing, Progressing  Goal: Optimal Comfort and Wellbeing  Outcome: Ongoing, Progressing  Goal: Readiness for Transition of Care  Outcome: Ongoing, Progressing     Problem: UTI (Urinary Tract Infection)  Goal: Improved Infection Symptoms  Outcome: Ongoing, Progressing     Problem: Hypertension Acute  Goal: Blood Pressure Within Desired Range  Outcome: Ongoing, Progressing     Problem: Fall Injury Risk  Goal: Absence of Fall and Fall-Related Injury  Outcome: Ongoing, Progressing     Problem: Bleeding (Cholecystectomy)  Goal: Absence of Bleeding  Outcome: Ongoing, Progressing     Problem: Bowel Motility Impaired (Cholecystectomy)  Goal: Effective Bowel Elimination  Outcome: Ongoing, Progressing     Problem: Fluid and Electrolyte Imbalance (Cholecystectomy)  Goal: Fluid and Electrolyte Balance  Outcome: Ongoing, Progressing     Problem: Infection (Cholecystectomy)  Goal: Absence of Infection Signs and Symptoms  Outcome: Ongoing, Progressing     Problem: Ongoing Anesthesia Effects (Cholecystectomy)  Goal: Anesthesia/Sedation Recovery  Outcome: Ongoing, Progressing     Problem: Pain (Cholecystectomy)  Goal: Acceptable Pain Control  Outcome: Ongoing, Progressing     Problem: Postoperative Nausea and Vomiting (Cholecystectomy)  Goal: Nausea and Vomiting Relief  Outcome: Ongoing, Progressing     Problem: Postoperative Urinary Retention (Cholecystectomy)  Goal: Effective Urinary Elimination  Outcome: Ongoing, Progressing     Problem: Respiratory Compromise (Cholecystectomy)  Goal: Effective Oxygenation and Ventilation  Outcome: Ongoing, Progressing

## 2023-04-16 LAB
ANION GAP SERPL CALC-SCNC: 6 MEQ/L (ref 2–13)
BUN SERPL-MCNC: 5 MG/DL (ref 7–20)
CALCIUM SERPL-MCNC: 8.6 MG/DL (ref 8.4–10.2)
CHLORIDE SERPL-SCNC: 108 MMOL/L (ref 98–110)
CO2 SERPL-SCNC: 23 MMOL/L (ref 21–32)
CREAT SERPL-MCNC: 0.58 MG/DL (ref 0.66–1.25)
CREAT/UREA NIT SERPL: 9 (ref 12–20)
ERYTHROCYTE [DISTWIDTH] IN BLOOD BY AUTOMATED COUNT: 14 % (ref 11–14.5)
GFR SERPLBLD CREATININE-BSD FMLA CKD-EPI: >90 MLS/MIN/1.73/M2
GLUCOSE SERPL-MCNC: 91 MG/DL (ref 70–115)
HCT VFR BLD AUTO: 30.8 % (ref 36–48)
HGB BLD-MCNC: 9.9 G/DL (ref 11.8–16)
MCH RBC QN AUTO: 25.7 PG (ref 27–34)
MCV RBC AUTO: 80 FL (ref 79–99)
MEAN CELL HEMOGLOBIN CONCENTRATION (OHS) G/DL: 32.1 G/DL (ref 31–37)
NRBC BLD AUTO-RTO: 0 % (ref 0–1)
PLATELET # BLD AUTO: 499 X10(3)/MCL (ref 140–371)
PMV BLD AUTO: 8.8 FL (ref 9.4–12.4)
POTASSIUM SERPL-SCNC: 4.3 MMOL/L (ref 3.5–5.1)
RBC # BLD AUTO: 3.85 X10(6)/MCL (ref 4–5.1)
SODIUM SERPL-SCNC: 137 MMOL/L (ref 135–145)
WBC # SPEC AUTO: 8.9 X10(3)/MCL (ref 4–11.5)

## 2023-04-16 PROCEDURE — 25000003 PHARM REV CODE 250: Performed by: INTERNAL MEDICINE

## 2023-04-16 PROCEDURE — 80048 BASIC METABOLIC PNL TOTAL CA: CPT | Performed by: SURGERY

## 2023-04-16 PROCEDURE — 63600175 PHARM REV CODE 636 W HCPCS: Performed by: INTERNAL MEDICINE

## 2023-04-16 PROCEDURE — 94761 N-INVAS EAR/PLS OXIMETRY MLT: CPT

## 2023-04-16 PROCEDURE — 25000003 PHARM REV CODE 250: Performed by: SURGERY

## 2023-04-16 PROCEDURE — 94760 N-INVAS EAR/PLS OXIMETRY 1: CPT

## 2023-04-16 PROCEDURE — 21400001 HC TELEMETRY ROOM

## 2023-04-16 PROCEDURE — 85027 COMPLETE CBC AUTOMATED: CPT | Performed by: SURGERY

## 2023-04-16 PROCEDURE — 36415 COLL VENOUS BLD VENIPUNCTURE: CPT | Performed by: SURGERY

## 2023-04-16 PROCEDURE — 25000003 PHARM REV CODE 250: Performed by: FAMILY MEDICINE

## 2023-04-16 PROCEDURE — 63600175 PHARM REV CODE 636 W HCPCS: Performed by: SURGERY

## 2023-04-16 RX ORDER — CIPROFLOXACIN 500 MG/1
500 TABLET ORAL EVERY 12 HOURS
Status: DISCONTINUED | OUTPATIENT
Start: 2023-04-16 | End: 2023-04-18 | Stop reason: HOSPADM

## 2023-04-16 RX ORDER — LISINOPRIL 10 MG/1
20 TABLET ORAL DAILY
Status: DISCONTINUED | OUTPATIENT
Start: 2023-04-16 | End: 2023-04-17

## 2023-04-16 RX ORDER — METOPROLOL SUCCINATE 50 MG/1
50 TABLET, EXTENDED RELEASE ORAL 2 TIMES DAILY
Status: DISCONTINUED | OUTPATIENT
Start: 2023-04-16 | End: 2023-04-17

## 2023-04-16 RX ADMIN — HYDROCODONE BITARTRATE AND ACETAMINOPHEN 1 TABLET: 5; 325 TABLET ORAL at 05:04

## 2023-04-16 RX ADMIN — CIPROFLOXACIN HYDROCHLORIDE 500 MG: 500 TABLET, FILM COATED ORAL at 12:04

## 2023-04-16 RX ADMIN — SODIUM CHLORIDE AND POTASSIUM CHLORIDE: 9; 2.98 INJECTION, SOLUTION INTRAVENOUS at 01:04

## 2023-04-16 RX ADMIN — PIPERACILLIN AND TAZOBACTAM 4.5 G: 4; .5 INJECTION, POWDER, FOR SOLUTION INTRAVENOUS; PARENTERAL at 05:04

## 2023-04-16 RX ADMIN — LISINOPRIL 20 MG: 10 TABLET ORAL at 01:04

## 2023-04-16 RX ADMIN — IBUPROFEN 600 MG: 600 TABLET, FILM COATED ORAL at 04:04

## 2023-04-16 RX ADMIN — CLONIDINE HYDROCHLORIDE 0.2 MG: 0.1 TABLET ORAL at 07:04

## 2023-04-16 RX ADMIN — PIPERACILLIN AND TAZOBACTAM 4.5 G: 4; .5 INJECTION, POWDER, FOR SOLUTION INTRAVENOUS; PARENTERAL at 01:04

## 2023-04-16 RX ADMIN — METOPROLOL SUCCINATE 50 MG: 50 TABLET, EXTENDED RELEASE ORAL at 09:04

## 2023-04-16 RX ADMIN — CLONIDINE HYDROCHLORIDE 0.2 MG: 0.1 TABLET ORAL at 01:04

## 2023-04-16 RX ADMIN — ENOXAPARIN SODIUM 40 MG: 100 INJECTION SUBCUTANEOUS at 03:04

## 2023-04-16 RX ADMIN — CIPROFLOXACIN HYDROCHLORIDE 500 MG: 500 TABLET, FILM COATED ORAL at 09:04

## 2023-04-16 RX ADMIN — PIPERACILLIN AND TAZOBACTAM 4.5 G: 4; .5 INJECTION, POWDER, FOR SOLUTION INTRAVENOUS; PARENTERAL at 09:04

## 2023-04-16 NOTE — PROGRESS NOTES
[facility]/Surg  [facility] Medicine  Progress Note    Patient Name: Tony Ruggiero  MRN: [id number]  Patient Class: IP- Inpatient   Admission Date: 4/12/2023  Length of Stay: 3 days  Attending Physician: Kathy Esposito MD  Primary Care Provider: RUFINO Anderson        Subjective:     Principal Problem:Acute cholecystitis        HPI:  28 yo female with no sig PMH presents with 5 days of abd pain, RUQ, recently seen and treated for UTI returns with pain, fever. NO chest pain, SOB.           Overview/Hospital Course:  04/13/223 pt had surgery this am spoke with DR. Krishna pt with large prurulent component, placed CLYDE drain and will needs at least 24 hr of iv abx, pt c/o severe pain, MS not helping, gave dilauded, some help, pt still with significnat pain.  04/14/2023 pt up and ambulating + Flatus and BM x2 still c/o pain  BP has been elevated.  Pt reports h/o severe preecclampsia about 11 months ago during deliver, BP was high post delivery, but has come down, not currently on meds, + family h/o HTN.  04/15/2023 pt with fever overnight pain is improving.  On zosyn iv and cipro po for UTI.  04/16/2023 temp 100.7 ovenight pain is improving.  Continues on iv zosyn, cipro po for UTI prior to admit.      Interval History:       Review of Systems   Constitutional:  Negative for activity change, appetite change and fever.   Respiratory:  Negative for chest tightness, shortness of breath and wheezing.    Cardiovascular:  Negative for chest pain.   Gastrointestinal:  Negative for abdominal pain (improving), constipation, diarrhea, nausea and vomiting.   Objective:     Vital Signs (Most Recent):  Temp: 98.5 °F (36.9 °C) (04/16/23 1037)  Pulse: 85 (04/16/23 1037)  Resp: 20 (04/16/23 1037)  BP: (!) 163/101 (04/16/23 1037)  SpO2: 100 % (04/16/23 1037)   Vital Signs (24h Range):  Temp:  [97.6 °F (36.4 °C)-100.7 °F (38.2 °C)] 98.5 °F (36.9 °C)  Pulse:  [] 85  Resp:  [18-20] 20  SpO2:  [96 %-100 %] 100  %  BP: (136-163)/() 163/101     Weight: 105.3 kg (232 lb 2.3 oz)  Body mass index is 37.47 kg/m².    Intake/Output Summary (Last 24 hours) at 4/16/2023 1306  Last data filed at 4/16/2023 1130  Gross per 24 hour   Intake 3755 ml   Output 2 ml   Net 3753 ml        Physical Exam  Constitutional:       General: She is in acute distress.      Appearance: Normal appearance. She is obese. She is ill-appearing.   Cardiovascular:      Rate and Rhythm: Normal rate and regular rhythm.      Pulses: Normal pulses.      Heart sounds: Normal heart sounds.   Pulmonary:      Effort: Pulmonary effort is normal.      Breath sounds: Normal breath sounds.   Abdominal:      Palpations: Abdomen is soft.      Tenderness: There is no abdominal tenderness (post op tenderness).   Skin:     Capillary Refill: Capillary refill takes less than 2 seconds.      Findings: No lesion or rash.   Neurological:      General: No focal deficit present.      Mental Status: She is alert.      Comments: Sedated from pain meds, minimally arousable, mom at bedside       Significant Labs: All pertinent labs within the past 24 hours have been reviewed.  CBC:   Recent Labs   Lab 04/15/23  0539 04/16/23  0541   WBC 11.9* 8.9   HGB 10.3* 9.9*   HCT 31.6* 30.8*   * 499*       CMP:   Recent Labs   Lab 04/15/23  0539 04/15/23  0854 04/16/23  0541    133* 137   K 4.2 4.1 4.3   CO2 25 18* 23   BUN 5.0* 4.0* 5.0*   CREATININE 0.58* 0.57* 0.58*   CALCIUM 8.4 8.2* 8.6   ALBUMIN  --  2.8*  --    BILITOT  --  0.9  --    ALKPHOS  --  108  --    AST  --  56*  --    ALT  --  42  --          Significant Imaging: I have reviewed all pertinent imaging results/findings within the past 24 hours.      Assessment/Plan:      * Acute cholecystitis  Continue  iv abx  S/p lap alejandrina      SIRS (systemic inflammatory response syndrome)  Due to choelycystitis  Iv abx contiue  cx negative so far      HTN (hypertension)  Still elevated  continue metoprolol increase to 50mg  bid  Add lisinopril 20 daily  Suspect underlying Chronic HTN      Hypokalemia  Patient has hypokalemia which is currently controlled. Last electrolytes reviewed- Recent Labs   Lab 04/13/23 0416 04/13/23 2018 04/14/23 0454   K 3.1* 3.6 3.8   . Will replace potassium and monitor electrolytes closely.         UTI (urinary tract infection)  + e coli on cx   Continue cipro        VTE Risk Mitigation (From admission, onward)         Ordered     enoxaparin injection 40 mg  Daily         04/13/23 0423     IP VTE HIGH RISK PATIENT  Once         04/13/23 0423     Place sequential compression device  Until discontinued         04/13/23 0423                Discharge Planning   MIAH:      Code Status: Full Code   Is the patient medically ready for discharge?:     Reason for patient still in hospital (select all that apply): Patient trending condition and Treatment  Discharge Plan A: Home with family                  Kathy Esposito MD  Department of Hospital Medicine   [facility]

## 2023-04-16 NOTE — SUBJECTIVE & OBJECTIVE
Interval History:       Review of Systems   Constitutional:  Negative for activity change, appetite change and fever.   Respiratory:  Negative for chest tightness, shortness of breath and wheezing.    Cardiovascular:  Negative for chest pain.   Gastrointestinal:  Negative for abdominal pain (improving), constipation, diarrhea, nausea and vomiting.   Objective:     Vital Signs (Most Recent):  Temp: 98.5 °F (36.9 °C) (04/16/23 1037)  Pulse: 85 (04/16/23 1037)  Resp: 20 (04/16/23 1037)  BP: (!) 163/101 (04/16/23 1037)  SpO2: 100 % (04/16/23 1037)   Vital Signs (24h Range):  Temp:  [97.6 °F (36.4 °C)-100.7 °F (38.2 °C)] 98.5 °F (36.9 °C)  Pulse:  [] 85  Resp:  [18-20] 20  SpO2:  [96 %-100 %] 100 %  BP: (136-163)/() 163/101     Weight: 105.3 kg (232 lb 2.3 oz)  Body mass index is 37.47 kg/m².    Intake/Output Summary (Last 24 hours) at 4/16/2023 1306  Last data filed at 4/16/2023 1130  Gross per 24 hour   Intake 3755 ml   Output 2 ml   Net 3753 ml        Physical Exam  Constitutional:       General: She is in acute distress.      Appearance: Normal appearance. She is obese. She is ill-appearing.   Cardiovascular:      Rate and Rhythm: Normal rate and regular rhythm.      Pulses: Normal pulses.      Heart sounds: Normal heart sounds.   Pulmonary:      Effort: Pulmonary effort is normal.      Breath sounds: Normal breath sounds.   Abdominal:      Palpations: Abdomen is soft.      Tenderness: There is no abdominal tenderness (post op tenderness).   Skin:     Capillary Refill: Capillary refill takes less than 2 seconds.      Findings: No lesion or rash.   Neurological:      General: No focal deficit present.      Mental Status: She is alert.      Comments: Sedated from pain meds, minimally arousable, mom at bedside       Significant Labs: All pertinent labs within the past 24 hours have been reviewed.  CBC:   Recent Labs   Lab 04/15/23  0539 04/16/23  0541   WBC 11.9* 8.9   HGB 10.3* 9.9*   HCT 31.6* 30.8*   PLT  Pt cx'd her 05/16/22 cpx apt with you on her patient portal with comments:    Reason for Cancellation: [facility]/Seen by Other Dr. patel to verify if she has another pcp or plans on still seeing Dr Hurtado   471* 499*       CMP:   Recent Labs   Lab 04/15/23  0539 04/15/23  0854 04/16/23  0541    133* 137   K 4.2 4.1 4.3   CO2 25 18* 23   BUN 5.0* 4.0* 5.0*   CREATININE 0.58* 0.57* 0.58*   CALCIUM 8.4 8.2* 8.6   ALBUMIN  --  2.8*  --    BILITOT  --  0.9  --    ALKPHOS  --  108  --    AST  --  56*  --    ALT  --  42  --          Significant Imaging: I have reviewed all pertinent imaging results/findings within the past 24 hours.

## 2023-04-16 NOTE — PROGRESS NOTES
[facility]-Med/Surg  General Surgery  Progress Note    Subjective:     Interval History:  Postop day 3 from laparoscopic cholecystectomy for acute on chronic cholecystitis.  Yesterday patient having 2-3 low-grade febrile episodes without complaint of abdominal pain discomfort.  CLYDE drain has been functioning appropriately.  Thin serosanguineous output no enteric content or bilious content.  WBC has normalized since yesterday however due to the recurrent febrile advanced patient will be continued on antibiotics targeted intra-abdominal infection as well as urologic infection based upon testing.    Post-Op Info:  Procedure(s) (LRB):  CHOLECYSTECTOMY, LAPAROSCOPIC (N/A)   3 Days Post-Op      Medications:  Continuous Infusions:   0.9% NACL & POTASSIUM CHLORIDE 40 MEQ/L 100 mL/hr at 04/16/23 0102     Scheduled Meds:   enoxaparin  40 mg Subcutaneous Daily    metoprolol succinate  50 mg Oral QHS    piperacillin-tazobactam (ZOSYN) IVPB  4.5 g Intravenous Q8H     PRN Meds:cloNIDine, HYDROcodone-acetaminophen, ibuprofen, iopamidoL, labetalol, melatonin, ondansetron, sodium chloride 0.9%     Objective:     Vital Signs (Most Recent):  Temp: 98.5 °F (36.9 °C) (04/16/23 0701)  Pulse: 90 (04/16/23 0724)  Resp: 20 (04/16/23 0724)  BP: (!) 148/103 (04/16/23 0701)  SpO2: 98 % (04/16/23 0701)   Vital Signs (24h Range):  Temp:  [97.6 °F (36.4 °C)-100.7 °F (38.2 °C)] 98.5 °F (36.9 °C)  Pulse:  [] 90  Resp:  [18-20] 20  SpO2:  [96 %-99 %] 98 %  BP: (136-162)/() 148/103       Intake/Output Summary (Last 24 hours) at 4/16/2023 1034  Last data filed at 4/16/2023 0604  Gross per 24 hour   Intake 3755 ml   Output 2 ml   Net 3753 ml       Physical Exam    Significant Labs:  CBC:   Recent Labs   Lab 04/16/23  0541   WBC 8.9   RBC 3.85*   HGB 9.9*   HCT 30.8*   *   MCV 80.0   MCH 25.7*   MCHC 32.1     CMP:   Recent Labs   Lab 04/15/23  0854 04/16/23  0541   CALCIUM 8.2* 8.6   ALBUMIN 2.8*  --    * 137   K  4.1 4.3   CO2 18* 23   BUN 4.0* 5.0*   CREATININE 0.57* 0.58*   ALKPHOS 108  --    ALT 42  --    AST 56*  --    BILITOT 0.9  --        Significant Diagnostics:  None    Assessment/Plan:     Active Diagnoses:    Diagnosis Date Noted POA    PRINCIPAL PROBLEM:  Acute cholecystitis [K81.0] 04/13/2023 Yes    SIRS (systemic inflammatory response syndrome) [R65.10] 04/15/2023 Yes    HTN (hypertension) [I10] 04/14/2023 Yes    Hypokalemia [E87.6] 04/14/2023 Yes    UTI (urinary tract infection) [N39.0] 04/14/2023 Yes      Problems Resolved During this Admission:    Diagnosis Date Noted Date Resolved POA    Sepsis [A41.9] 04/15/2023 04/15/2023 Yes   Recommendation to continue with empiric IV antibiotic therapy until tomorrow with reassessment  Begin to advance diet to a full liquid with monitoring for abdominal pain symptoms.  Patient encouraged to ambulate frequently  Education has been provided for drain management monitoring      Екатерина Krishna MD  General Surgery  [facility]-Med/Surg

## 2023-04-16 NOTE — ASSESSMENT & PLAN NOTE
Still elevated  continue metoprolol increase to 50mg bid  Add lisinopril 20 daily  Suspect underlying Chronic HTN

## 2023-04-17 LAB
ALBUMIN SERPL-MCNC: 3.3 G/DL (ref 3.4–5)
ALBUMIN/GLOB SERPL: 1.1 RATIO
ALP SERPL-CCNC: 156 UNIT/L (ref 50–144)
ALT SERPL-CCNC: 55 UNIT/L (ref 1–45)
ANION GAP SERPL CALC-SCNC: 9 MEQ/L (ref 2–13)
AST SERPL-CCNC: 44 UNIT/L (ref 14–36)
BILIRUBIN DIRECT+TOT PNL SERPL-MCNC: 0.6 MG/DL (ref 0–1)
BUN SERPL-MCNC: <2 MG/DL (ref 7–20)
CALCIUM SERPL-MCNC: 8.9 MG/DL (ref 8.4–10.2)
CHLORIDE SERPL-SCNC: 105 MMOL/L (ref 98–110)
CO2 SERPL-SCNC: 24 MMOL/L (ref 21–32)
CREAT SERPL-MCNC: 0.56 MG/DL (ref 0.66–1.25)
CREAT/UREA NIT SERPL: <4 (ref 12–20)
ERYTHROCYTE [DISTWIDTH] IN BLOOD BY AUTOMATED COUNT: 13.6 % (ref 11–14.5)
GFR SERPLBLD CREATININE-BSD FMLA CKD-EPI: >90 MLS/MIN/1.73/M2
GLOBULIN SER-MCNC: 2.9 GM/DL (ref 2–3.9)
GLUCOSE SERPL-MCNC: 104 MG/DL (ref 70–115)
HCT VFR BLD AUTO: 31.6 % (ref 36–48)
HGB BLD-MCNC: 10.4 G/DL (ref 11.8–16)
MCH RBC QN AUTO: 25.9 PG (ref 27–34)
MCV RBC AUTO: 78.6 FL (ref 79–99)
MEAN CELL HEMOGLOBIN CONCENTRATION (OHS) G/DL: 32.9 G/DL (ref 31–37)
NRBC BLD AUTO-RTO: 0 % (ref 0–1)
PLATELET # BLD AUTO: 578 X10(3)/MCL (ref 140–371)
PMV BLD AUTO: 8.5 FL (ref 9.4–12.4)
POTASSIUM SERPL-SCNC: 4 MMOL/L (ref 3.5–5.1)
PROT SERPL-MCNC: 6.2 GM/DL (ref 6.3–8.2)
RBC # BLD AUTO: 4.02 X10(6)/MCL (ref 4–5.1)
SODIUM SERPL-SCNC: 138 MMOL/L (ref 135–145)
WBC # SPEC AUTO: 9 X10(3)/MCL (ref 4–11.5)

## 2023-04-17 PROCEDURE — 36415 COLL VENOUS BLD VENIPUNCTURE: CPT | Performed by: SURGERY

## 2023-04-17 PROCEDURE — 85027 COMPLETE CBC AUTOMATED: CPT | Performed by: SURGERY

## 2023-04-17 PROCEDURE — 25000003 PHARM REV CODE 250: Performed by: INTERNAL MEDICINE

## 2023-04-17 PROCEDURE — 25000003 PHARM REV CODE 250: Performed by: FAMILY MEDICINE

## 2023-04-17 PROCEDURE — 25000003 PHARM REV CODE 250: Performed by: SURGERY

## 2023-04-17 PROCEDURE — 63600175 PHARM REV CODE 636 W HCPCS: Performed by: SURGERY

## 2023-04-17 PROCEDURE — 21400001 HC TELEMETRY ROOM

## 2023-04-17 PROCEDURE — 80053 COMPREHEN METABOLIC PANEL: CPT | Performed by: SURGERY

## 2023-04-17 PROCEDURE — 63600175 PHARM REV CODE 636 W HCPCS: Performed by: INTERNAL MEDICINE

## 2023-04-17 PROCEDURE — 94761 N-INVAS EAR/PLS OXIMETRY MLT: CPT

## 2023-04-17 RX ORDER — METOPROLOL SUCCINATE 50 MG/1
100 TABLET, EXTENDED RELEASE ORAL 2 TIMES DAILY
Status: DISCONTINUED | OUTPATIENT
Start: 2023-04-17 | End: 2023-04-18

## 2023-04-17 RX ORDER — METOPROLOL SUCCINATE 50 MG/1
50 TABLET, EXTENDED RELEASE ORAL ONCE
Status: COMPLETED | OUTPATIENT
Start: 2023-04-17 | End: 2023-04-17

## 2023-04-17 RX ORDER — LISINOPRIL 40 MG/1
40 TABLET ORAL DAILY
Status: DISCONTINUED | OUTPATIENT
Start: 2023-04-18 | End: 2023-04-18 | Stop reason: HOSPADM

## 2023-04-17 RX ORDER — LISINOPRIL 10 MG/1
20 TABLET ORAL ONCE
Status: COMPLETED | OUTPATIENT
Start: 2023-04-17 | End: 2023-04-17

## 2023-04-17 RX ADMIN — METOPROLOL SUCCINATE 100 MG: 50 TABLET, EXTENDED RELEASE ORAL at 08:04

## 2023-04-17 RX ADMIN — METOPROLOL SUCCINATE 50 MG: 50 TABLET, EXTENDED RELEASE ORAL at 12:04

## 2023-04-17 RX ADMIN — HYDROCODONE BITARTRATE AND ACETAMINOPHEN 1 TABLET: 5; 325 TABLET ORAL at 07:04

## 2023-04-17 RX ADMIN — PIPERACILLIN AND TAZOBACTAM 4.5 G: 4; .5 INJECTION, POWDER, FOR SOLUTION INTRAVENOUS; PARENTERAL at 03:04

## 2023-04-17 RX ADMIN — LISINOPRIL 20 MG: 10 TABLET ORAL at 12:04

## 2023-04-17 RX ADMIN — LABETALOL HYDROCHLORIDE 10 MG: 5 INJECTION, SOLUTION INTRAVENOUS at 03:04

## 2023-04-17 RX ADMIN — CIPROFLOXACIN HYDROCHLORIDE 500 MG: 500 TABLET, FILM COATED ORAL at 09:04

## 2023-04-17 RX ADMIN — PIPERACILLIN AND TAZOBACTAM 4.5 G: 4; .5 INJECTION, POWDER, FOR SOLUTION INTRAVENOUS; PARENTERAL at 05:04

## 2023-04-17 RX ADMIN — HYDROCODONE BITARTRATE AND ACETAMINOPHEN 1 TABLET: 5; 325 TABLET ORAL at 03:04

## 2023-04-17 RX ADMIN — METOPROLOL SUCCINATE 50 MG: 50 TABLET, EXTENDED RELEASE ORAL at 09:04

## 2023-04-17 RX ADMIN — CIPROFLOXACIN HYDROCHLORIDE 500 MG: 500 TABLET, FILM COATED ORAL at 08:04

## 2023-04-17 RX ADMIN — PIPERACILLIN AND TAZOBACTAM 4.5 G: 4; .5 INJECTION, POWDER, FOR SOLUTION INTRAVENOUS; PARENTERAL at 08:04

## 2023-04-17 RX ADMIN — SODIUM CHLORIDE AND POTASSIUM CHLORIDE: 9; 2.98 INJECTION, SOLUTION INTRAVENOUS at 03:04

## 2023-04-17 RX ADMIN — CLONIDINE HYDROCHLORIDE 0.2 MG: 0.1 TABLET ORAL at 11:04

## 2023-04-17 RX ADMIN — LISINOPRIL 20 MG: 10 TABLET ORAL at 09:04

## 2023-04-17 RX ADMIN — CLONIDINE HYDROCHLORIDE 0.2 MG: 0.1 TABLET ORAL at 05:04

## 2023-04-17 RX ADMIN — ENOXAPARIN SODIUM 40 MG: 100 INJECTION SUBCUTANEOUS at 05:04

## 2023-04-17 NOTE — PROGRESS NOTES
[facility]/Surg  [facility] Medicine  Progress Note    Patient Name: Tony Ruggiero  MRN: [id number]  Patient Class: IP- Inpatient   Admission Date: 4/12/2023  Length of Stay: 4 days  Attending Physician: Kathy Esposito MD  Primary Care Provider: RUFINO Anderson        Subjective:     Principal Problem:Acute cholecystitis        HPI:  28 yo female with no sig PMH presents with 5 days of abd pain, RUQ, recently seen and treated for UTI returns with pain, fever. NO chest pain, SOB.           Overview/Hospital Course:  04/13/223 pt had surgery this am spoke with DR. Krishna pt with large prurulent component, placed CLYDE drain and will needs at least 24 hr of iv abx, pt c/o severe pain, MS not helping, gave dilauded, some help, pt still with significnat pain.  04/14/2023 pt up and ambulating + Flatus and BM x2 still c/o pain  BP has been elevated.  Pt reports h/o severe preecclampsia about 11 months ago during deliver, BP was high post delivery, but has come down, not currently on meds, + family h/o HTN.  04/15/2023 pt with fever overnight pain is improving.  On zosyn iv and cipro po for UTI.  04/16/2023 temp 100.7 ovenight pain is improving.  Continues on iv zosyn, cipro po for UTI prior to admit.  04/17/2023 pt with T 100.5 overnight around 1900 no fever this am, BP still elevated despite prn meds       Interval History:       Review of Systems   Constitutional:  Negative for activity change, appetite change and fever.   Respiratory:  Negative for chest tightness, shortness of breath and wheezing.    Cardiovascular:  Negative for chest pain.   Gastrointestinal:  Negative for abdominal pain (improving), constipation, diarrhea, nausea and vomiting.   Objective:     Vital Signs (Most Recent):  Temp: 99.1 °F (37.3 °C) (04/17/23 1049)  Pulse: 77 (04/17/23 1207)  Resp: 18 (04/17/23 1049)  BP: (!) 152/100 (04/17/23 1320)  SpO2: 98 % (04/17/23 1049)   Vital Signs (24h Range):  Temp:  [98.2 °F (36.8  °C)-100.5 °F (38.1 °C)] 99.1 °F (37.3 °C)  Pulse:  [73-98] 77  Resp:  [18-20] 18  SpO2:  [96 %-100 %] 98 %  BP: (139-190)/() 152/100     Weight: 105.3 kg (232 lb 2.3 oz)  Body mass index is 37.47 kg/m².    Intake/Output Summary (Last 24 hours) at 4/17/2023 1517  Last data filed at 4/17/2023 1128  Gross per 24 hour   Intake 490 ml   Output 0.5 ml   Net 489.5 ml        Physical Exam  Constitutional:       General: She is in acute distress.      Appearance: Normal appearance. She is obese. She is ill-appearing.   Cardiovascular:      Rate and Rhythm: Normal rate and regular rhythm.      Pulses: Normal pulses.      Heart sounds: Normal heart sounds.   Pulmonary:      Effort: Pulmonary effort is normal.      Breath sounds: Normal breath sounds.   Abdominal:      Palpations: Abdomen is soft.      Tenderness: There is no abdominal tenderness (post op tenderness).   Skin:     Capillary Refill: Capillary refill takes less than 2 seconds.      Findings: No lesion or rash.   Neurological:      General: No focal deficit present.      Mental Status: She is alert.      Comments: Sedated from pain meds, minimally arousable, mom at bedside       Significant Labs: All pertinent labs within the past 24 hours have been reviewed.  CBC:   Recent Labs   Lab 04/16/23  0541 04/17/23  0541   WBC 8.9 9.0   HGB 9.9* 10.4*   HCT 30.8* 31.6*   * 578*       CMP:   Recent Labs   Lab 04/16/23  0541 04/17/23  0541    138   K 4.3 4.0   CO2 23 24   BUN 5.0* <2.0*   CREATININE 0.58* 0.56*   CALCIUM 8.6 8.9   ALBUMIN  --  3.3*   BILITOT  --  0.6   ALKPHOS  --  156*   AST  --  44*   ALT  --  55*         Significant Imaging: I have reviewed all pertinent imaging results/findings within the past 24 hours.      Assessment/Plan:      * Acute cholecystitis  Continue  iv abx  S/p lap alejandrina      SIRS (systemic inflammatory response syndrome)  Due to choelycystitis  Iv abx contiue  cx negative so far      HTN (hypertension)  Still  elevated  continue metoprolol increase to 50mg bid  Add lisinopril 20 daily  Suspect underlying Chronic HTN      Hypokalemia  Patient has hypokalemia which is currently controlled. Last electrolytes reviewed- Recent Labs   Lab 04/13/23 0416 04/13/23 2018 04/14/23 0454   K 3.1* 3.6 3.8   . Will replace potassium and monitor electrolytes closely.         UTI (urinary tract infection)  + e coli on cx   Continue cipro        VTE Risk Mitigation (From admission, onward)         Ordered     enoxaparin injection 40 mg  Daily         04/13/23 0423     IP VTE HIGH RISK PATIENT  Once         04/13/23 0423     Place sequential compression device  Until discontinued         04/13/23 0423                Discharge Planning   MIAH:      Code Status: Full Code   Is the patient medically ready for discharge?:     Reason for patient still in hospital (select all that apply): Patient trending condition and Treatment  Discharge Plan A: Home with family                  aKthy Esposito MD  Department of Hospital Medicine   [facility]-Med/Surg

## 2023-04-17 NOTE — PROGRESS NOTES
[facility]-Med/Surg  General Surgery  Progress Note    Subjective:     Interval History:  Postop day 4 from laparoscopic cholecystectomy.  Patient appears to be recovering appropriately however at this down still has a low-grade intermittent fever.  Recommendation would be for continuation of empiric IV antibiotic therapy until patient remains afebrile for 24 hours.  Last febrile episode was last night.    Post-Op Info:  Procedure(s) (LRB):  CHOLECYSTECTOMY, LAPAROSCOPIC (N/A)   4 Days Post-Op      Medications:  Continuous Infusions:   0.9% NACL & POTASSIUM CHLORIDE 40 MEQ/L 100 mL/hr at 04/17/23 0334     Scheduled Meds:   ciprofloxacin HCl  500 mg Oral Q12H    enoxaparin  40 mg Subcutaneous Daily    [START ON 4/18/2023] lisinopriL  40 mg Oral Daily    metoprolol succinate  100 mg Oral BID    piperacillin-tazobactam (ZOSYN) IVPB  4.5 g Intravenous Q8H     PRN Meds:cloNIDine, HYDROcodone-acetaminophen, ibuprofen, iopamidoL, labetalol, melatonin, ondansetron, sodium chloride 0.9%     Objective:     Vital Signs (Most Recent):  Temp: 99.1 °F (37.3 °C) (04/17/23 1049)  Pulse: 77 (04/17/23 1207)  Resp: 18 (04/17/23 1049)  BP: (!) 152/100 (04/17/23 1320)  SpO2: 98 % (04/17/23 1049)   Vital Signs (24h Range):  Temp:  [98.2 °F (36.8 °C)-100.5 °F (38.1 °C)] 99.1 °F (37.3 °C)  Pulse:  [73-98] 77  Resp:  [18-20] 18  SpO2:  [96 %-100 %] 98 %  BP: (139-190)/() 152/100       Intake/Output Summary (Last 24 hours) at 4/17/2023 1624  Last data filed at 4/17/2023 1128  Gross per 24 hour   Intake 490 ml   Output 0.5 ml   Net 489.5 ml       Physical Exam  Abdominal:      General: Abdomen is flat. There is no distension.      Palpations: Abdomen is soft. There is no mass.      Tenderness: There is no abdominal tenderness. There is no right CVA tenderness, left CVA tenderness, guarding or rebound.      Hernia: No hernia is present.      Comments: CLYDE drain functioning appropriately under the right quadrant with thin  serosanguineous output and no bilious or enteric content    Trocar incisional wounds appear to be healing appropriately without signs of gross infection       Significant Labs:  CBC:   Recent Labs   Lab 04/17/23  0541   WBC 9.0   RBC 4.02   HGB 10.4*   HCT 31.6*   *   MCV 78.6*   MCH 25.9*   MCHC 32.9     CMP:   Recent Labs   Lab 04/17/23  0541   CALCIUM 8.9   ALBUMIN 3.3*      K 4.0   CO2 24   BUN <2.0*   CREATININE 0.56*   ALKPHOS 156*   ALT 55*   AST 44*   BILITOT 0.6       Significant Diagnostics:  None    Assessment/Plan:     Active Diagnoses:    Diagnosis Date Noted POA    PRINCIPAL PROBLEM:  Acute cholecystitis [K81.0] 04/13/2023 Yes    SIRS (systemic inflammatory response syndrome) [R65.10] 04/15/2023 Yes    HTN (hypertension) [I10] 04/14/2023 Yes    Hypokalemia [E87.6] 04/14/2023 Yes    UTI (urinary tract infection) [N39.0] 04/14/2023 Yes      Problems Resolved During this Admission:    Diagnosis Date Noted Date Resolved POA    Sepsis [A41.9] 04/15/2023 04/15/2023 Yes     Recommendation for continuation of empiric IV antibiotic therapy until patient remains afebrile for 24 hours.  Patient educated on CLYDE drain monitoring and maintenance by stripping the drain every 8-12 hours and monitoring for CLYDE drain output consistency.    Екатерина Krishna MD  General Surgery  [facility]-Med/Surg

## 2023-04-17 NOTE — SUBJECTIVE & OBJECTIVE
Interval History:       Review of Systems   Constitutional:  Negative for activity change, appetite change and fever.   Respiratory:  Negative for chest tightness, shortness of breath and wheezing.    Cardiovascular:  Negative for chest pain.   Gastrointestinal:  Negative for abdominal pain (improving), constipation, diarrhea, nausea and vomiting.   Objective:     Vital Signs (Most Recent):  Temp: 99.1 °F (37.3 °C) (04/17/23 1049)  Pulse: 77 (04/17/23 1207)  Resp: 18 (04/17/23 1049)  BP: (!) 152/100 (04/17/23 1320)  SpO2: 98 % (04/17/23 1049)   Vital Signs (24h Range):  Temp:  [98.2 °F (36.8 °C)-100.5 °F (38.1 °C)] 99.1 °F (37.3 °C)  Pulse:  [73-98] 77  Resp:  [18-20] 18  SpO2:  [96 %-100 %] 98 %  BP: (139-190)/() 152/100     Weight: 105.3 kg (232 lb 2.3 oz)  Body mass index is 37.47 kg/m².    Intake/Output Summary (Last 24 hours) at 4/17/2023 1517  Last data filed at 4/17/2023 1128  Gross per 24 hour   Intake 490 ml   Output 0.5 ml   Net 489.5 ml        Physical Exam  Constitutional:       General: She is in acute distress.      Appearance: Normal appearance. She is obese. She is ill-appearing.   Cardiovascular:      Rate and Rhythm: Normal rate and regular rhythm.      Pulses: Normal pulses.      Heart sounds: Normal heart sounds.   Pulmonary:      Effort: Pulmonary effort is normal.      Breath sounds: Normal breath sounds.   Abdominal:      Palpations: Abdomen is soft.      Tenderness: There is no abdominal tenderness (post op tenderness).   Skin:     Capillary Refill: Capillary refill takes less than 2 seconds.      Findings: No lesion or rash.   Neurological:      General: No focal deficit present.      Mental Status: She is alert.      Comments: Sedated from pain meds, minimally arousable, mom at bedside       Significant Labs: All pertinent labs within the past 24 hours have been reviewed.  CBC:   Recent Labs   Lab 04/16/23  0541 04/17/23  0541   WBC 8.9 9.0   HGB 9.9* 10.4*   HCT 30.8* 31.6*   PLT  499* 578*       CMP:   Recent Labs   Lab 04/16/23  0541 04/17/23  0541    138   K 4.3 4.0   CO2 23 24   BUN 5.0* <2.0*   CREATININE 0.58* 0.56*   CALCIUM 8.6 8.9   ALBUMIN  --  3.3*   BILITOT  --  0.6   ALKPHOS  --  156*   AST  --  44*   ALT  --  55*         Significant Imaging: I have reviewed all pertinent imaging results/findings within the past 24 hours.

## 2023-04-17 NOTE — PLAN OF CARE
Problem: Adult Inpatient Plan of Care  Goal: Plan of Care Review  Outcome: Ongoing, Progressing  Goal: Patient-Specific Goal (Individualized)  Outcome: Ongoing, Progressing  Goal: Absence of Hospital-Acquired Illness or Injury  Outcome: Ongoing, Progressing  Goal: Optimal Comfort and Wellbeing  Outcome: Ongoing, Progressing  Goal: Readiness for Transition of Care  Outcome: Ongoing, Progressing     Problem: UTI (Urinary Tract Infection)  Goal: Improved Infection Symptoms  Outcome: Ongoing, Progressing     Problem: Hypertension Acute  Goal: Blood Pressure Within Desired Range  Outcome: Ongoing, Progressing     Problem: Fall Injury Risk  Goal: Absence of Fall and Fall-Related Injury  Outcome: Ongoing, Progressing     Problem: Bleeding (Cholecystectomy)  Goal: Absence of Bleeding  Outcome: Ongoing, Progressing     Problem: Bowel Motility Impaired (Cholecystectomy)  Goal: Effective Bowel Elimination  Outcome: Ongoing, Progressing     Problem: Fluid and Electrolyte Imbalance (Cholecystectomy)  Goal: Fluid and Electrolyte Balance  Outcome: Ongoing, Progressing     Problem: Infection (Cholecystectomy)  Goal: Absence of Infection Signs and Symptoms  Outcome: Ongoing, Progressing     Problem: Ongoing Anesthesia Effects (Cholecystectomy)  Goal: Anesthesia/Sedation Recovery  Outcome: Ongoing, Progressing     Problem: Pain (Cholecystectomy)  Goal: Acceptable Pain Control  Outcome: Ongoing, Progressing     Problem: Postoperative Nausea and Vomiting (Cholecystectomy)  Goal: Nausea and Vomiting Relief  Outcome: Ongoing, Progressing     Problem: Postoperative Urinary Retention (Cholecystectomy)  Goal: Effective Urinary Elimination  Outcome: Ongoing, Progressing     Problem: Respiratory Compromise (Cholecystectomy)  Goal: Effective Oxygenation and Ventilation  Outcome: Ongoing, Progressing

## 2023-04-18 VITALS
WEIGHT: 232.13 LBS | BODY MASS INDEX: 37.31 KG/M2 | DIASTOLIC BLOOD PRESSURE: 95 MMHG | HEIGHT: 66 IN | SYSTOLIC BLOOD PRESSURE: 165 MMHG | OXYGEN SATURATION: 98 % | TEMPERATURE: 99 F | RESPIRATION RATE: 16 BRPM | HEART RATE: 85 BPM

## 2023-04-18 LAB
ALBUMIN SERPL-MCNC: 3.2 G/DL (ref 3.4–5)
ALBUMIN/GLOB SERPL: 1.1 RATIO
ALP SERPL-CCNC: 141 UNIT/L (ref 50–144)
ALT SERPL-CCNC: 45 UNIT/L (ref 1–45)
ANION GAP SERPL CALC-SCNC: 8 MEQ/L (ref 2–13)
AST SERPL-CCNC: 30 UNIT/L (ref 14–36)
BILIRUBIN DIRECT+TOT PNL SERPL-MCNC: 0.5 MG/DL (ref 0–1)
BUN SERPL-MCNC: 2 MG/DL (ref 7–20)
CALCIUM SERPL-MCNC: 8.5 MG/DL (ref 8.4–10.2)
CHLORIDE SERPL-SCNC: 103 MMOL/L (ref 98–110)
CO2 SERPL-SCNC: 26 MMOL/L (ref 21–32)
CREAT SERPL-MCNC: 0.57 MG/DL (ref 0.66–1.25)
CREAT/UREA NIT SERPL: 4 (ref 12–20)
ERYTHROCYTE [DISTWIDTH] IN BLOOD BY AUTOMATED COUNT: 13.6 % (ref 11–14.5)
GFR SERPLBLD CREATININE-BSD FMLA CKD-EPI: >90 MLS/MIN/1.73/M2
GLOBULIN SER-MCNC: 3 GM/DL (ref 2–3.9)
GLUCOSE SERPL-MCNC: 101 MG/DL (ref 70–115)
HCT VFR BLD AUTO: 31.3 % (ref 36–48)
HGB BLD-MCNC: 10.3 G/DL (ref 11.8–16)
MCH RBC QN AUTO: 25.8 PG (ref 27–34)
MCV RBC AUTO: 78.3 FL (ref 79–99)
MEAN CELL HEMOGLOBIN CONCENTRATION (OHS) G/DL: 32.9 G/DL (ref 31–37)
NRBC BLD AUTO-RTO: 0 % (ref 0–1)
PLATELET # BLD AUTO: 613 X10(3)/MCL (ref 140–371)
PMV BLD AUTO: 9.1 FL (ref 9.4–12.4)
POTASSIUM SERPL-SCNC: 4.5 MMOL/L (ref 3.5–5.1)
PROT SERPL-MCNC: 6.2 GM/DL (ref 6.3–8.2)
RBC # BLD AUTO: 4 X10(6)/MCL (ref 4–5.1)
SODIUM SERPL-SCNC: 137 MMOL/L (ref 135–145)
WBC # SPEC AUTO: 7.5 X10(3)/MCL (ref 4–11.5)

## 2023-04-18 PROCEDURE — 94761 N-INVAS EAR/PLS OXIMETRY MLT: CPT

## 2023-04-18 PROCEDURE — 85027 COMPLETE CBC AUTOMATED: CPT | Performed by: SURGERY

## 2023-04-18 PROCEDURE — 63600175 PHARM REV CODE 636 W HCPCS: Performed by: INTERNAL MEDICINE

## 2023-04-18 PROCEDURE — 36415 COLL VENOUS BLD VENIPUNCTURE: CPT | Performed by: SURGERY

## 2023-04-18 PROCEDURE — 80053 COMPREHEN METABOLIC PANEL: CPT | Performed by: SURGERY

## 2023-04-18 PROCEDURE — 25000003 PHARM REV CODE 250: Performed by: INTERNAL MEDICINE

## 2023-04-18 PROCEDURE — 25000003 PHARM REV CODE 250: Performed by: FAMILY MEDICINE

## 2023-04-18 RX ORDER — METOPROLOL SUCCINATE 200 MG/1
200 TABLET, EXTENDED RELEASE ORAL 2 TIMES DAILY
Qty: 60 TABLET | Refills: 0 | Status: SHIPPED | OUTPATIENT
Start: 2023-04-18 | End: 2024-04-17

## 2023-04-18 RX ORDER — LISINOPRIL 40 MG/1
40 TABLET ORAL DAILY
Qty: 90 TABLET | Refills: 0 | Status: SHIPPED | OUTPATIENT
Start: 2023-04-19 | End: 2024-04-18

## 2023-04-18 RX ORDER — HYDROCHLOROTHIAZIDE 12.5 MG/1
12.5 TABLET ORAL DAILY
Status: DISCONTINUED | OUTPATIENT
Start: 2023-04-18 | End: 2023-04-18 | Stop reason: HOSPADM

## 2023-04-18 RX ORDER — AMLODIPINE BESYLATE 5 MG/1
5 TABLET ORAL DAILY
Qty: 30 TABLET | Refills: 0 | Status: SHIPPED | OUTPATIENT
Start: 2023-04-19 | End: 2024-04-18

## 2023-04-18 RX ORDER — CIPROFLOXACIN 500 MG/1
500 TABLET ORAL EVERY 12 HOURS
Qty: 10 TABLET | Refills: 0 | Status: SHIPPED | OUTPATIENT
Start: 2023-04-18

## 2023-04-18 RX ORDER — HYDROCHLOROTHIAZIDE 12.5 MG/1
12.5 TABLET ORAL DAILY
Qty: 30 TABLET | Refills: 0 | Status: SHIPPED | OUTPATIENT
Start: 2023-04-19 | End: 2024-04-18

## 2023-04-18 RX ORDER — AMLODIPINE BESYLATE 5 MG/1
5 TABLET ORAL DAILY
Status: DISCONTINUED | OUTPATIENT
Start: 2023-04-18 | End: 2023-04-18 | Stop reason: HOSPADM

## 2023-04-18 RX ORDER — METOPROLOL SUCCINATE 50 MG/1
200 TABLET, EXTENDED RELEASE ORAL 2 TIMES DAILY
Status: DISCONTINUED | OUTPATIENT
Start: 2023-04-18 | End: 2023-04-18 | Stop reason: HOSPADM

## 2023-04-18 RX ORDER — HYDROCODONE BITARTRATE AND ACETAMINOPHEN 5; 325 MG/1; MG/1
1 TABLET ORAL EVERY 6 HOURS PRN
Status: DISCONTINUED | OUTPATIENT
Start: 2023-04-18 | End: 2023-04-18 | Stop reason: HOSPADM

## 2023-04-18 RX ADMIN — HYDROCHLOROTHIAZIDE 12.5 MG: 12.5 TABLET ORAL at 09:04

## 2023-04-18 RX ADMIN — SODIUM CHLORIDE AND POTASSIUM CHLORIDE: 9; 2.98 INJECTION, SOLUTION INTRAVENOUS at 12:04

## 2023-04-18 RX ADMIN — METOPROLOL SUCCINATE 200 MG: 50 TABLET, EXTENDED RELEASE ORAL at 09:04

## 2023-04-18 RX ADMIN — CLONIDINE HYDROCHLORIDE 0.2 MG: 0.1 TABLET ORAL at 12:04

## 2023-04-18 RX ADMIN — CIPROFLOXACIN HYDROCHLORIDE 500 MG: 500 TABLET, FILM COATED ORAL at 09:04

## 2023-04-18 RX ADMIN — LISINOPRIL 40 MG: 40 TABLET ORAL at 09:04

## 2023-04-18 RX ADMIN — AMLODIPINE BESYLATE 5 MG: 5 TABLET ORAL at 09:04

## 2023-04-18 RX ADMIN — PIPERACILLIN AND TAZOBACTAM 4.5 G: 4; .5 INJECTION, POWDER, FOR SOLUTION INTRAVENOUS; PARENTERAL at 04:04

## 2023-04-18 NOTE — DISCHARGE INSTRUCTIONS
Monitor blood pressure several times a day and log values per MD, report findings to PCPs at follow-up appointments. Take medications as prescribed unless modified by physician.

## 2023-04-18 NOTE — DISCHARGE SUMMARY
Hospital Medicine  Discharge Summary    Patient Name: Tony Ruggiero  MRN: [id number]  Admit Date: 4/12/2023  Discharge Date:    Status: IP- Inpatient   Length of Stay: 5      PHYSICIANS   Admitting Physician: Kathy Esposito MD  Discharging Physician: Papo Webster MD.  Primary Care Physician: Phill Giordano, FNP        DISCHARGE DIAGNOSES   Acute cholecystitis  Continue  iv abx  S/p lap alejandrina        SIRS (systemic inflammatory response syndrome)  Due to choelycystitis  Iv abx contiue  cx negative so far        HTN (hypertension)  Still elevated  continue metoprolol increase to 50mg bid  Add lisinopril 20 daily  Suspect underlying Chronic HTN        Hypokalemia        Patient has hypokalemia which is currently controlled. Last electrolytes reviewed- Recent Labs   Lab 04/13/23 0416 04/13/23 2018 04/14/23  0454   K 3.1* 3.6 3.8   . Will replace potassium and monitor electrolytes closely.            UTI (urinary tract infection)  + e coli on cx   Continue cipro              PROCEDURES         HOSPITAL COURSE    26 yo female with no sig PMH presents with 5 days of abd pain, RUQ, recently seen and treated for UTI returns with pain, fever. NO chest pain, SOB.               Overview/Hospital Course:  04/13/223 pt had surgery this am spoke with DR. Krishna pt with large prurulent component, placed CLYDE drain and will needs at least 24 hr of iv abx, pt c/o severe pain, MS not helping, gave dilauded, some help, pt still with significnat pain.  04/14/2023 pt up and ambulating + Flatus and BM x2 still c/o pain  BP has been elevated.  Pt reports h/o severe preecclampsia about 11 months ago during deliver, BP was high post delivery, but has come down, not currently on meds, + family h/o HTN.  04/15/2023 pt with fever overnight pain is improving.  On zosyn iv and cipro po for UTI.  04/16/2023 temp 100.7 ovenight pain is improving.  Continues on iv zosyn, cipro po for UTI prior to admit.  04/17/2023 pt with T 100.5  overnight around 1900 no fever this am, BP still elevated despite prn meds     Fever free 24 hrs    Surgery rec d/c home with suresh drain will f/u in clinic 1 week    BP meds sent [facility]    STATUS  Improved, Stable    DISPOSITION  Discharge to home     DIET  Cardiac     ACTIVITY  As tolerated      FOLLOW-UP      Follow-up Information       Екатерина Krishna MD. Call in 1 week(s).    Specialty: General Surgery  Contact information:  [address] [facility] [place] [id number] [phone]                               DISCHARGE MEDICATION RECONCILIATION        Medication List        START taking these medications      amLODIPine 5 MG tablet  Commonly known as: NORVASC  Take 1 tablet (5 mg total) by mouth once daily.  Start taking on: April 19, 2023     hydroCHLOROthiazide 12.5 MG Tab  Commonly known as: HYDRODIURIL  Take 1 tablet (12.5 mg total) by mouth once daily.  Start taking on: April 19, 2023     lisinopriL 40 MG tablet  Commonly known as: PRINIVIL,ZESTRIL  Take 1 tablet (40 mg total) by mouth once daily.  Start taking on: April 19, 2023     metoprolol succinate 200 MG 24 hr tablet  Commonly known as: TOPROL-XL  Take 1 tablet (200 mg total) by mouth 2 (two) times daily.            CONTINUE taking these medications      ciprofloxacin HCl 500 MG tablet  Commonly known as: CIPRO  Take 1 tablet (500 mg total) by mouth every 12 (twelve) hours.            ASK your doctor about these medications      HYDROcodone-acetaminophen 5-325 mg per tablet  Commonly known as: NORCO  Take 1 tablet by mouth every 6 (six) hours as needed for Pain.  Ask about: Should I take this medication?     ondansetron 4 MG Tbdl  Commonly known as: ZOFRAN-ODT  Take 2 tablets (8 mg total) by mouth every 8 (eight) hours as needed.  Ask about: Should I take this medication?               Where to Get Your Medications        These medications were sent to [facility] - [place] - [address] [place]  [address]      Phone: [phone]   amLODIPine 5 MG tablet  ciprofloxacin HCl 500 MG tablet  hydroCHLOROthiazide 12.5 MG Tab  lisinopriL 40 MG tablet  metoprolol succinate 200 MG 24 hr tablet           PHYSICAL EXAM   VITALS: T 98.7 °F (37.1 °C)   BP (!) 165/95   P 85   RR 16   O2 98 %    Physical Exam  Vitals reviewed.   HENT:      Head: Normocephalic and atraumatic.   Pulmonary:      Effort: Pulmonary effort is normal.      Breath sounds: Normal breath sounds.   Abdominal:      Comments: Lap sites C/D/I    CLYDE drain in place minimal serosanginous draiange    Neurological:      Mental Status: She is alert.            Discharge time: 33 minutes     Papo Webster MD  [facility]       DIAGNOSITCS   CBC:   Recent Labs   Lab 04/16/23  0541 04/17/23  0541 04/18/23  0434   WBC 8.9 9.0 7.5   HGB 9.9* 10.4* 10.3*   HCT 30.8* 31.6* 31.3*   * 578* 613*     COAG:  No results for input(s): APTT, INR, PTT in the last 168 hours.  CMP:   Recent Labs   Lab 04/13/23  0416 04/13/23  2018 04/15/23  0854 04/16/23  0541 04/17/23  0541 04/18/23  0434   CALCIUM 8.6   < > 8.2* 8.6 8.9 8.5   ALBUMIN 3.8   < > 2.8*  --  3.3* 3.2*      < > 133* 137 138 137   K 3.1*   < > 4.1 4.3 4.0 4.5   CO2 30   < > 18* 23 24 26   BUN 2.0*   < > 4.0* 5.0* <2.0* 2.0*   CREATININE 0.57*   < > 0.57* 0.58* 0.56* 0.57*   ALKPHOS 137   < > 108  --  156* 141   ALT 57*   < > 42  --  55* 45   AST 52*   < > 56*  --  44* 30   BILITOT 0.9   < > 0.9  --  0.6 0.5   MG 2.20  --   --   --   --   --     < > = values in this interval not displayed.     Estimated Creatinine Clearance: 185.1 mL/min (A) (based on SCr of 0.57 mg/dL (L)).  CARDIAC ENZYMES: No results for input(s): TROPONINI, CPK, CKMB in the last 72 hours.     Recent Labs   Lab 04/13/23  0021   LIPASE 51         No results for input(s): POCTGLUCOSE in the last 72 hours.     Microbiology Results (last 7 days)       Procedure Component Value Units Date/Time    Urine culture [[id number]]  Collected: 04/13/23 0045    Order Status: Completed Specimen: Urine, Clean Catch Updated: 04/15/23 0757     Urine Culture No Significant Growth    Blood Culture #1 **CANNOT BE ORDERED STAT** [[id number]] Collected: 04/13/23 0021    Order Status: Sent Specimen: Blood from Antecubital, Left Updated: 04/13/23 0024               CT Abdomen Pelvis With Contrast    Result Date: 4/9/2023  EXAM: CT ABDOMEN PELVIS WITH CONTRAST CLINICAL INDICATION:  Right lower quadrant pain TECHNIQUE: Axial and multiplanar 2-D reformations provided FINDINGS: Appendix normal caliber.  No obstructive bowel findings Liver, spleen pancreas and kidneys unremarkable. Gallbladder present Urinary bladder decompressed Intrauterine device in place      No acute findings are identified All CT scans at [this location] are performed using dose modulation techniques as appropriate to a performed exam including the following: automated exposure control; adjustment of the mA and/or kV according to patient size (this includes techniques or standardized protocols for targeted exams where dose is matched to indication / reason for exam; i.e. extremities or head); use of iterative reconstruction technique. Finalized on: 4/9/2023 11:54 PM By:  Clementina Pereyra MD [facility]# [id number]      2023-04-09 23:56:44.930    [facility]    US Kidney    Result Date: 4/18/2023  EXAMINATION: STUDY: US KIDNEY CLINICAL HISTORY AND TECHNIQUE: Corinna Wiley, RT on 4/18/2023  8:04 AM INPT 303 CLINICAL HX; 4 DAYS POST OP LAP NIDA --ELEVATED BLOOD PRESSURE AND UTI PMH:NONE TECH:2D TRANSABD RENAL US US TECH:PLP COMPARISON: None FINDINGS: The right kidney measures 11.4 cm in length and the left kidney measures 10.6 cm in length. I see no intraparenchymal masses, intraparenchymal calcifications or significant hydronephrosis. Duplex imaging demonstrates adequate antegrade flow and waveforms within both renal arteries and veins, the inferior vena cava and abdominal aorta.  The bifurcation of the  abdominal aorta is obscured by overlying bowel gas.  The urinary bladder appears adequately distended and grossly unremarkable.     1. No significant abnormalities are noted. Electronically signed by: Tyshawn Reynolds Date:    04/18/2023 Time:    08:23    US Abdomen Limited    Result Date: 4/13/2023  EXAMINATION: STUDY: US ABDOMEN LIMITED CLINICAL HISTORY AND TECHNIQUE: Reji Lopez RT on 4/13/2023  2:32 AM ER PT Past Medical History: Technique: US ABD LIMITED WITH COLOR AND DOPPLER CV: Current Clinical History: X SEVERAL DAYS  RUQ AND RLQ ABD PN  NAUSEA Technologist:GREGG COMPARISON: None FINDINGS: Liver: The hepatic parenchyma appears unremarkable with no worrisome focal masses or dilated biliary radicals.  Duplex imaging demonstrates adequate antegrade flow and waveforms within the portal vein, hepatic artery, and hepatic veins. Gallbladder/biliary System: The gallbladder is adequately distended with multiple small stones noted layering dependently, thickening of the gallbladder wall (3.5 mm) and significant tenderness while scanning over the gallbladder.  There is no evidence of pericholecystic edema.The common bile duct, where visualized, is not dilated nor do I see evidence of intraductal stones. Miscellaneous: N/A     1. Cholelithiasis with changes of cholecystitis as described above.  See above comments. Electronically signed by: Tyshawn Reynolds Date:    04/13/2023 Time:    04:41

## 2023-04-18 NOTE — PLAN OF CARE
04/18/23 1302   Final Note   Assessment Type Final Discharge Note   Anticipated Discharge Disposition Home   What phone number can be called within the next 1-3 days to see how you are doing after discharge? [phone]   Post-Acute Status   Discharge Delays None known at this time

## 2023-04-18 NOTE — PLAN OF CARE
Problem: Adult Inpatient Plan of Care  Goal: Plan of Care Review  Outcome: Ongoing, Progressing  Goal: Patient-Specific Goal (Individualized)  Outcome: Ongoing, Progressing  Goal: Absence of Hospital-Acquired Illness or Injury  Outcome: Ongoing, Progressing  Goal: Optimal Comfort and Wellbeing  Outcome: Ongoing, Progressing  Goal: Readiness for Transition of Care  Outcome: Ongoing, Progressing     Problem: UTI (Urinary Tract Infection)  Goal: Improved Infection Symptoms  Outcome: Ongoing, Progressing     Problem: Fall Injury Risk  Goal: Absence of Fall and Fall-Related Injury  Outcome: Ongoing, Progressing     Problem: Bleeding (Cholecystectomy)  Goal: Absence of Bleeding  Outcome: Ongoing, Progressing     Problem: Bowel Motility Impaired (Cholecystectomy)  Goal: Effective Bowel Elimination  Outcome: Ongoing, Progressing     Problem: Fluid and Electrolyte Imbalance (Cholecystectomy)  Goal: Fluid and Electrolyte Balance  Outcome: Ongoing, Progressing     Problem: Infection (Cholecystectomy)  Goal: Absence of Infection Signs and Symptoms  Outcome: Ongoing, Progressing     Problem: Ongoing Anesthesia Effects (Cholecystectomy)  Goal: Anesthesia/Sedation Recovery  Outcome: Ongoing, Progressing     Problem: Pain (Cholecystectomy)  Goal: Acceptable Pain Control  Outcome: Ongoing, Progressing     Problem: Postoperative Nausea and Vomiting (Cholecystectomy)  Goal: Nausea and Vomiting Relief  Outcome: Ongoing, Progressing     Problem: Postoperative Urinary Retention (Cholecystectomy)  Goal: Effective Urinary Elimination  Outcome: Ongoing, Progressing     Problem: Respiratory Compromise (Cholecystectomy)  Goal: Effective Oxygenation and Ventilation  Outcome: Ongoing, Progressing

## 2023-04-19 LAB — BACTERIA BLD CULT: NORMAL

## 2023-06-15 ENCOUNTER — HOSPITAL ENCOUNTER (EMERGENCY)
Facility: HOSPITAL | Age: 28
Discharge: LEFT BEFORE DISCHARGE | End: 2023-06-15
Attending: FAMILY MEDICINE
Payer: MEDICAID

## 2023-06-15 VITALS
WEIGHT: 225 LBS | RESPIRATION RATE: 18 BRPM | HEART RATE: 62 BPM | OXYGEN SATURATION: 100 % | DIASTOLIC BLOOD PRESSURE: 106 MMHG | BODY MASS INDEX: 36.16 KG/M2 | TEMPERATURE: 98 F | HEIGHT: 66 IN | SYSTOLIC BLOOD PRESSURE: 154 MMHG

## 2023-06-15 DIAGNOSIS — K83.1 COMMON BILE DUCT (CBD) OBSTRUCTION: Primary | ICD-10-CM

## 2023-06-15 DIAGNOSIS — R10.9 ABDOMINAL PAIN, UNSPECIFIED ABDOMINAL LOCATION: ICD-10-CM

## 2023-06-15 DIAGNOSIS — R79.89 ELEVATED LIVER FUNCTION TESTS: ICD-10-CM

## 2023-06-15 LAB
ALBUMIN SERPL-MCNC: 4.8 G/DL (ref 3.4–5)
ALBUMIN/GLOB SERPL: 1.2 RATIO
ALP SERPL-CCNC: 375 UNIT/L (ref 50–144)
ALT SERPL-CCNC: 363 UNIT/L (ref 1–45)
ANION GAP SERPL CALC-SCNC: 7 MEQ/L (ref 2–13)
AST SERPL-CCNC: 188 UNIT/L (ref 14–36)
B-HCG SERPL QL: NEGATIVE
BASOPHILS # BLD AUTO: 0.04 X10(3)/MCL (ref 0.01–0.08)
BASOPHILS NFR BLD AUTO: 0.8 % (ref 0.1–1.2)
BILIRUBIN DIRECT+TOT PNL SERPL-MCNC: 2.2 MG/DL (ref 0–0.3)
BILIRUBIN DIRECT+TOT PNL SERPL-MCNC: 3.4 MG/DL (ref 0–1)
BUN SERPL-MCNC: 7 MG/DL (ref 7–20)
CALCIUM SERPL-MCNC: 9.4 MG/DL (ref 8.4–10.2)
CHLORIDE SERPL-SCNC: 105 MMOL/L (ref 98–110)
CO2 SERPL-SCNC: 27 MMOL/L (ref 21–32)
CREAT SERPL-MCNC: 0.72 MG/DL (ref 0.66–1.25)
CREAT/UREA NIT SERPL: 10 (ref 12–20)
EOSINOPHIL # BLD AUTO: 0.34 X10(3)/MCL (ref 0.04–0.36)
EOSINOPHIL NFR BLD AUTO: 6.8 % (ref 0.7–7)
ERYTHROCYTE [DISTWIDTH] IN BLOOD BY AUTOMATED COUNT: 17.1 % (ref 11–14.5)
GFR SERPLBLD CREATININE-BSD FMLA CKD-EPI: >90 MLS/MIN/1.73/M2
GLOBULIN SER-MCNC: 3.9 GM/DL (ref 2–3.9)
GLUCOSE SERPL-MCNC: 101 MG/DL (ref 70–115)
HCT VFR BLD AUTO: 41.3 % (ref 36–48)
HGB BLD-MCNC: 13.9 G/DL (ref 11.8–16)
IMM GRANULOCYTES # BLD AUTO: 0.01 X10(3)/MCL (ref 0–0.03)
IMM GRANULOCYTES NFR BLD AUTO: 0.2 % (ref 0–0.5)
LIPASE SERPL-CCNC: 73 U/L (ref 23–300)
LYMPHOCYTES # BLD AUTO: 1.94 X10(3)/MCL (ref 1.16–3.74)
LYMPHOCYTES NFR BLD AUTO: 38.6 % (ref 20–55)
MCH RBC QN AUTO: 25.9 PG (ref 27–34)
MCHC RBC AUTO-ENTMCNC: 33.7 G/DL (ref 31–37)
MCV RBC AUTO: 76.9 FL (ref 79–99)
MONOCYTES # BLD AUTO: 0.37 X10(3)/MCL (ref 0.24–0.36)
MONOCYTES NFR BLD AUTO: 7.4 % (ref 4.7–12.5)
NEUTROPHILS # BLD AUTO: 2.33 X10(3)/MCL (ref 1.56–6.13)
NEUTROPHILS NFR BLD AUTO: 46.2 % (ref 37–73)
NRBC BLD AUTO-RTO: 0 %
PLATELET # BLD AUTO: 553 X10(3)/MCL (ref 140–371)
PMV BLD AUTO: 9.8 FL (ref 9.4–12.4)
POTASSIUM SERPL-SCNC: 3.3 MMOL/L (ref 3.5–5.1)
PROT SERPL-MCNC: 8.7 GM/DL (ref 6.3–8.2)
RBC # BLD AUTO: 5.37 X10(6)/MCL (ref 4–5.1)
SODIUM SERPL-SCNC: 139 MMOL/L (ref 135–145)
WBC # SPEC AUTO: 5.03 X10(3)/MCL (ref 4–11.5)

## 2023-06-15 PROCEDURE — 25500020 PHARM REV CODE 255: Performed by: FAMILY MEDICINE

## 2023-06-15 PROCEDURE — 81025 URINE PREGNANCY TEST: CPT | Performed by: FAMILY MEDICINE

## 2023-06-15 PROCEDURE — 99285 EMERGENCY DEPT VISIT HI MDM: CPT | Mod: 25

## 2023-06-15 PROCEDURE — 80053 COMPREHEN METABOLIC PANEL: CPT | Performed by: FAMILY MEDICINE

## 2023-06-15 PROCEDURE — 36415 COLL VENOUS BLD VENIPUNCTURE: CPT | Performed by: FAMILY MEDICINE

## 2023-06-15 PROCEDURE — 83690 ASSAY OF LIPASE: CPT | Performed by: FAMILY MEDICINE

## 2023-06-15 PROCEDURE — 85025 COMPLETE CBC W/AUTO DIFF WBC: CPT | Performed by: FAMILY MEDICINE

## 2023-06-15 PROCEDURE — 82248 BILIRUBIN DIRECT: CPT | Performed by: FAMILY MEDICINE

## 2023-06-15 PROCEDURE — 25000003 PHARM REV CODE 250: Performed by: FAMILY MEDICINE

## 2023-06-15 RX ORDER — CLONIDINE HYDROCHLORIDE 0.1 MG/1
0.3 TABLET ORAL
Status: COMPLETED | OUTPATIENT
Start: 2023-06-15 | End: 2023-06-15

## 2023-06-15 RX ORDER — LABETALOL HCL 20 MG/4 ML
20 SYRINGE (ML) INTRAVENOUS
Status: DISCONTINUED | OUTPATIENT
Start: 2023-06-15 | End: 2023-06-15 | Stop reason: HOSPADM

## 2023-06-15 RX ADMIN — CLONIDINE HYDROCHLORIDE 0.3 MG: 0.1 TABLET ORAL at 06:06

## 2023-06-15 RX ADMIN — CLONIDINE HYDROCHLORIDE 0.3 MG: 0.1 TABLET ORAL at 01:06

## 2023-06-15 RX ADMIN — IOPAMIDOL 100 ML: 612 INJECTION, SOLUTION INTRAVENOUS at 04:06

## 2023-06-15 NOTE — ED PROVIDER NOTES
Encounter Date: 6/15/2023       History     Chief Complaint   Patient presents with    Abdominal Pain     AMB TO ED WITH C/O ABD PAIN, ITCHING ALL OVER HER BODY, AND YELLOW SCLARA X 1 WEEK.      [place] of care note:    Pt is a 27 y.o. female complaining of abdominal pain and jaundice. Their evaluation was initiated by previous ER physician and turned over to my care at shift change. I have examined the patient and agree with previous documentation.      Reji Wang MD  5:21 PM 06/15/2023     Patient currently in no distress.  Liver functions and bilirubin elevated.  CT scan performed showing cystic structure in the fossa unlikely abscess.  Awaiting transfer to appropriate facility for probable ERCP.      Review of patient's allergies indicates:  No Known Allergies  Past Medical History:   Diagnosis Date    Hypertension      Past Surgical History:   Procedure Laterality Date    LAPAROSCOPIC CHOLECYSTECTOMY N/A 4/13/2023    Procedure: CHOLECYSTECTOMY, LAPAROSCOPIC;  Surgeon: Екатерина Krishna MD;  Location: [facility] OR;  Service: General;  Laterality: N/A;     History reviewed. No pertinent family history.  Social History     Tobacco Use    Smoking status: Never    Smokeless tobacco: Never   Substance Use Topics    Alcohol use: Never    Drug use: Never     Review of Systems    Physical Exam     Initial Vitals [06/15/23 1239]   BP Pulse Resp Temp SpO2   (!) 179/127 110 20 98.9 °F (37.2 °C) 99 %      MAP       --         Physical Exam    ED Course   Procedures  Labs Reviewed   COMPREHENSIVE METABOLIC PANEL - Abnormal; Notable for the following components:       Result Value    Potassium Level 3.3 (*)     Protein Total 8.7 (*)     Bilirubin Total 3.4 (*)     Alkaline Phosphatase 375 (*)     Alanine Aminotransferase 363 (*)     Aspartate Aminotransferase 188 (*)     BUN/Creatinine Ratio 10 (*)     All other components within normal limits   BILIRUBIN, DIRECT - Abnormal; Notable for the following components:    Bilirubin  Direct 2.2 (*)     All other components within normal limits   CBC WITH DIFFERENTIAL - Abnormal; Notable for the following components:    RBC 5.37 (*)     MCV 76.9 (*)     MCH 25.9 (*)     RDW 17.1 (*)     Platelet 553 (*)     Mono # 0.37 (*)     All other components within normal limits   LIPASE - Normal   HCG QUALITATIVE URINE - Normal   CBC W/ AUTO DIFFERENTIAL    Narrative:     The following orders were created for panel order CBC auto differential.  Procedure                               Abnormality         Status                     ---------                               -----------         ------                     CBC with Differential[[id number]]        Abnormal            Final result                 Please view results for these tests on the individual orders.          Imaging Results              CT Abdomen Pelvis With Contrast (Final result)  Result time 06/15/23 16:59:08      Final result by Tyshawn Reynolds III, MD (06/15/23 16:59:08)                   Impression:      1. Postoperative changes are present compatible with patient's history of a recent cholecystectomy.  2. A 3 cm cystic structure containing internal echogenic debris and demonstrating no significant perfusion on color-flow mapping is noted within the gallbladder fossa.  I suspect this represents a postoperative hematoma versus less likely an atypical appearing abscess.  Clinical correlation is recommended.  3. There is mild dilatation of intrahepatic biliary radicles and notable dilatation of the common bile duct which measures 1.5 cm in greatest diameter with no obvious intraductal stone or evidence of a mass within the head of the pancreas or region of the ampulla of Vater.  The possibility of obstruction of the distal common bile duct with a non radiopaque stone should be excluded clinically and correlation with the patient's serum bilirubin level is recommended.      Electronically signed by: Tyshawn  Gail  Date:    06/15/2023  Time:    16:59               Narrative:    EXAMINATION:  STUDY:CT ABDOMEN PELVIS WITH CONTRAST    CLINICAL HISTORY AND TECHNIQUE:  Wally Austin, RT on 6/15/2023  4:51 PM    PATIENT STATUS: ER    PROCEDURE:  CT ABD/PEL W/CONTRAST    ULTRASOUND DONE TODAY    CLINICAL HX: JAUNDICE , RUQ PAIN , NAUSEA, VOMITING X A FEW DAYS,    PT HAD LAP NIDA, ON 4/23/2023    PMH: NIDA, HTN    IV CONTRAST: 100CC ISOVUE 300    ORAL CONTRAST: NONE    RECTAL CONTRAST:NONE    AXIAL IMAGES @ 5MM INTERVALS WITH MULTI PLANAR RECONSTRUCTION OF IMAGES    TOTAL IMAGE NUMBER : [id number]    NUMBER OF CT SCANS LAST 12 MONHTS: 2    CTDIvol(mGy) : HEAD:        BODY: 18.80    DLP (mGycm) : HEAD :       BODY: 943.20    TECH : DB    PT TRANSPORTED W/O INCIDENT    This patient has had two CT and nuclear medicine scans performed within the last 12 months.    The following DOSE REDUCTION TECHNIQUES are used for all CT scans at [facility]:    1. Automated exposure control.  2. Adjustment of the mA and/or kv according to patient size.  3. Use of iterative reconstruction technique.    COMPARISON:  04/09/2023    FINDINGS:  Liver: There is mild dilatation of intrahepatic biliary radicles.  No additional hepatic abnormalities are noted.    Gallbladder/biliary system: Surgical clips are noted within the gallbladder fossa compatible with patient's history of a recent cholecystectomy.  A 3 cm cystic structure containing internal echogenic debris and demonstrating no significant perfusion on color-flow mapping is noted within the gallbladder fossa.  There is notable dilatation of the common bile duct which measures 1.5 cm in greatest diameter with no obvious intraductal stone or evidence of a mass within the head of the pancreas or region of the ampulla of Vater.    Spleen: No clinically significant abnormalities are noted.    Adrenal glands: No clinically significant abnormalities are noted.    Pancreas: No  clinically significant abnormalities are noted.    Kidneys/ureters: No clinically significant abnormalities are noted.    Urinary bladder: The urinary bladder is poorly distended with no gross abnormalities noted on limited imaging.    Prostate gland/uterus and ovaries: An IUD is present appears well position within the endometrial cavity.  The uterus and ovaries appear otherwise unremarkable.    GI tract: Unopacified loops of large and small bowel as well as the gastric lumen and appendix are difficult to evaluate with no definite abnormalities appreciated.    Vascular structures: No clinically significant abnormalities are noted.    Musculoskeletal structures: No clinically significant abnormalities are noted.    Miscellaneous: N/A                                       US Abdomen Complete (Final result)  Result time 06/15/23 15:15:11      Final result by Tyshawn Reynolds III, MD (06/15/23 15:15:11)                   Impression:      1. The patient is post cholecystectomy.  2. A 2.7 cm, complex fluid collection is noted within the gallbladder fossa.  This may simply represent a postoperative hematoma or even loop of bowel, however, possibility of an abscess cannot be excluded based on this exam.  Further workup with a contrast enhanced CT scan of the abdomen may be helpful for better evaluation if felt be clinically necessary.  3. There is mild to moderate dilatation of the common bile duct (1.1 cm) with no obvious intraductal stones noted.  A CT scan of the abdomen may also be helpful for better evaluation of the distal common bile duct and ampulla of Vater and to exclude the possibility of a distal intraductal stone.      Electronically signed by: Tyshawn Reynolds  Date:    06/15/2023  Time:    15:15               Narrative:    EXAMINATION:  STUDY: US ABDOMEN COMPLETE    CLINICAL HISTORY AND TECHNIQUE:  RT Nara on 6/15/2023  2:59 PM    CLINICAL HX: -ER- JAUNDICE WITH RUQ PAIN AND N/V X FEW DAYS. PT HAD LAP NIDA  04/23    PMH: DENIES    TECHNIQUE: 2D TRANSABDOMINAL ULTRASOUND OF THE ABDOMEN    TECHNOLOGIST: TERESA    COMPARISON:  04/18/2023    FINDINGS:  Liver: The hepatic parenchyma appears unremarkable with no worrisome focal masses or dilated biliary radicals.  Duplex imaging demonstrates adequate antegrade flow and waveforms within the portal vein, hepatic artery, and hepatic veins.    Gallbladder/biliary System: The patient is post cholecystectomy.  A 2.7 cm, complex fluid collection is noted within the gallbladder fossa.  There is mild-to-moderate dilatation of the common bile duct (1.1 cm in greatest diameter) with no obvious intraductal stones appreciated.    Pancreas:Partially obscured by overlying bowel gas with no gross abnormalities noted.    Abdominal aorta:No clinically significant abnormalities are noted.    Inferior vena cava:No clinically significant abnormalities are noted.    Spleen:No clinically significant abnormalities are noted.    Kidneys: No clinically significant abnormalities are noted.    Miscellaneous: N/A                                       Medications   labetalol 20 mg/4 mL (5 mg/mL) IV syring (20 mg Intravenous Not Given 6/15/23 1330)   cloNIDine tablet 0.3 mg (0.3 mg Oral Given 6/15/23 1333)   iopamidoL (ISOVUE-300) injection 100 mL (100 mLs Intravenous Given 6/15/23 1644)                 ED Course as of 06/15/23 1724   Thu Jose Alberto 15, 2023   1723 CT Abdomen Pelvis With Contrast [RB]      ED Course User Index  [RB] Reji Wang MD                 Clinical Impression:   Final diagnoses:  [R79.89] Elevated liver function tests  [R10.9] Abdominal pain, unspecified abdominal location  [K83.1] Common bile duct (CBD) obstruction (Primary)        ED Disposition Condition    Transfer to Another Facility Stable                Reji Wang MD  06/15/23 1724

## 2023-06-15 NOTE — ED PROVIDER NOTES
Encounter Date: 6/15/2023       History     Chief Complaint   Patient presents with    Abdominal Pain     AMB TO ED WITH C/O ABD PAIN, ITCHING ALL OVER HER BODY, AND YELLOW SCLARA X 1 WEEK.      27-year-old black female presents to the emergency room complaining of right upper abdominal pain for the last week with associated itching all over and she thinks her sclerae are yellow.  Patient denies any nausea vomiting.  Patient states she did have some mild diarrhea but none now.  Patient denies any voiding complaints.  Patient states she is on her menstrual cycle presently.  Patient states he is not any blood pressure medicine because it does not work on her.  Patient states last time she was admitted for a gallbladder be taken out that she has in the hospital for 2 weeks and they were unable to get her blood pressure down.  Patient denies any headache or visual complaints.    The history is provided by the patient.   Review of patient's allergies indicates:  No Known Allergies  Past Medical History:   Diagnosis Date    Hypertension      Past Surgical History:   Procedure Laterality Date    LAPAROSCOPIC CHOLECYSTECTOMY N/A 4/13/2023    Procedure: CHOLECYSTECTOMY, LAPAROSCOPIC;  Surgeon: Екатерина Krishna MD;  Location: [facility];  Service: General;  Laterality: N/A;     History reviewed. No pertinent family history.  Social History     Tobacco Use    Smoking status: Never    Smokeless tobacco: Never   Substance Use Topics    Alcohol use: Never    Drug use: Never     Review of Systems   Gastrointestinal:  Positive for abdominal pain and diarrhea.   Skin:         Itching   All other systems reviewed and are negative.    Physical Exam     Initial Vitals [06/15/23 1239]   BP Pulse Resp Temp SpO2   (!) 179/127 110 20 98.9 °F (37.2 °C) 99 %      MAP       --         Physical Exam    Nursing note and vitals reviewed.  Constitutional:   Mildly obese black female alert in no acute distress.   HENT:   Head is atraumatic and  normocephalic.  Oropharynx is clear moist mucous membranes.  Nose is clear with no discharge.   Eyes:   Sclerae with mild jaundice.   Neck:   Neck is supple full range of motion no cervical lymphadenopathy.   Cardiovascular:            Heart is tachycardic regular rate and rhythm.   Pulmonary/Chest:   Lungs are clear to auscultation bilaterally.   Abdominal:   Abdomen is soft with moderate right upper quadrant tenderness with no jose francisco guarding or rebound.  No CVA tenderness noted.  Bowel sounds positive throughout.   Musculoskeletal:      Comments: Extremities with no clubbing cyanosis or edema.  Extremities with full range of motion.     Neurological:   Patient is alert and oriented x3.  Bilateral upper lower extremities normal sensation and strength.   Skin:   Skin is warm and dry.  Some areas of excoriation are noted secondary to itching.   Psychiatric:   Patient's mood and affect are normal.  Patient's thought content behavior normal as well.       ED Course   Procedures  Labs Reviewed   COMPREHENSIVE METABOLIC PANEL - Abnormal; Notable for the following components:       Result Value    Potassium Level 3.3 (*)     Protein Total 8.7 (*)     Bilirubin Total 3.4 (*)     Alkaline Phosphatase 375 (*)     Alanine Aminotransferase 363 (*)     Aspartate Aminotransferase 188 (*)     BUN/Creatinine Ratio 10 (*)     All other components within normal limits   BILIRUBIN, DIRECT - Abnormal; Notable for the following components:    Bilirubin Direct 2.2 (*)     All other components within normal limits   CBC WITH DIFFERENTIAL - Abnormal; Notable for the following components:    RBC 5.37 (*)     MCV 76.9 (*)     MCH 25.9 (*)     RDW 17.1 (*)     Platelet 553 (*)     Mono # 0.37 (*)     All other components within normal limits   LIPASE - Normal   HCG QUALITATIVE URINE - Normal   CBC W/ AUTO DIFFERENTIAL    Narrative:     The following orders were created for panel order CBC auto differential.  Procedure                                Abnormality         Status                     ---------                               -----------         ------                     CBC with Differential[[id number]]        Abnormal            Final result                 Please view results for these tests on the individual orders.          Imaging Results              CT Abdomen Pelvis With Contrast (Final result)  Result time 06/15/23 16:59:08      Final result by Tyshawn Reynolds III, MD (06/15/23 16:59:08)                   Impression:      1. Postoperative changes are present compatible with patient's history of a recent cholecystectomy.  2. A 3 cm cystic structure containing internal echogenic debris and demonstrating no significant perfusion on color-flow mapping is noted within the gallbladder fossa.  I suspect this represents a postoperative hematoma versus less likely an atypical appearing abscess.  Clinical correlation is recommended.  3. There is mild dilatation of intrahepatic biliary radicles and notable dilatation of the common bile duct which measures 1.5 cm in greatest diameter with no obvious intraductal stone or evidence of a mass within the head of the pancreas or region of the ampulla of Vater.  The possibility of obstruction of the distal common bile duct with a non radiopaque stone should be excluded clinically and correlation with the patient's serum bilirubin level is recommended.      Electronically signed by: Tyshawn Reynolds  Date:    06/15/2023  Time:    16:59               Narrative:    EXAMINATION:  STUDY:CT ABDOMEN PELVIS WITH CONTRAST    CLINICAL HISTORY AND TECHNIQUE:  RT Uche on 6/15/2023  4:51 PM    PATIENT STATUS: ER    PROCEDURE:  CT ABD/PEL W/CONTRAST    ULTRASOUND DONE TODAY    CLINICAL HX: JAUNDICE , RUQ PAIN , NAUSEA, VOMITING X A FEW DAYS,    PT HAD LAP NIDA, ON 4/23/2023    PMH: NIDA, HTN    IV CONTRAST: 100CC ISOVUE 300    ORAL CONTRAST: NONE    RECTAL CONTRAST:NONE    AXIAL IMAGES @ 5MM INTERVALS WITH MULTI  PLANAR RECONSTRUCTION OF IMAGES    TOTAL IMAGE NUMBER : [id number]    NUMBER OF CT SCANS LAST 12 MONHTS: 2    CTDIvol(mGy) : HEAD:        BODY: 18.80    DLP (mGycm) : HEAD :       BODY: 943.20    TECH : DB    PT TRANSPORTED W/O INCIDENT    This patient has had two CT and nuclear medicine scans performed within the last 12 months.    The following DOSE REDUCTION TECHNIQUES are used for all CT scans at [facility]:    1. Automated exposure control.  2. Adjustment of the mA and/or kv according to patient size.  3. Use of iterative reconstruction technique.    COMPARISON:  04/09/2023    FINDINGS:  Liver: There is mild dilatation of intrahepatic biliary radicles.  No additional hepatic abnormalities are noted.    Gallbladder/biliary system: Surgical clips are noted within the gallbladder fossa compatible with patient's history of a recent cholecystectomy.  A 3 cm cystic structure containing internal echogenic debris and demonstrating no significant perfusion on color-flow mapping is noted within the gallbladder fossa.  There is notable dilatation of the common bile duct which measures 1.5 cm in greatest diameter with no obvious intraductal stone or evidence of a mass within the head of the pancreas or region of the ampulla of Vater.    Spleen: No clinically significant abnormalities are noted.    Adrenal glands: No clinically significant abnormalities are noted.    Pancreas: No clinically significant abnormalities are noted.    Kidneys/ureters: No clinically significant abnormalities are noted.    Urinary bladder: The urinary bladder is poorly distended with no gross abnormalities noted on limited imaging.    Prostate gland/uterus and ovaries: An IUD is present appears well position within the endometrial cavity.  The uterus and ovaries appear otherwise unremarkable.    GI tract: Unopacified loops of large and small bowel as well as the gastric lumen and appendix are difficult to evaluate with no definite  abnormalities appreciated.    Vascular structures: No clinically significant abnormalities are noted.    Musculoskeletal structures: No clinically significant abnormalities are noted.    Miscellaneous: N/A                                       US Abdomen Complete (Final result)  Result time 06/15/23 15:15:11      Final result by Tyshawn Reynolds III, MD (06/15/23 15:15:11)                   Impression:      1. The patient is post cholecystectomy.  2. A 2.7 cm, complex fluid collection is noted within the gallbladder fossa.  This may simply represent a postoperative hematoma or even loop of bowel, however, possibility of an abscess cannot be excluded based on this exam.  Further workup with a contrast enhanced CT scan of the abdomen may be helpful for better evaluation if felt be clinically necessary.  3. There is mild to moderate dilatation of the common bile duct (1.1 cm) with no obvious intraductal stones noted.  A CT scan of the abdomen may also be helpful for better evaluation of the distal common bile duct and ampulla of Vater and to exclude the possibility of a distal intraductal stone.      Electronically signed by: Tyshawn Reynolds  Date:    06/15/2023  Time:    15:15               Narrative:    EXAMINATION:  STUDY: US ABDOMEN COMPLETE    CLINICAL HISTORY AND TECHNIQUE:  RT Nara on 6/15/2023  2:59 PM    CLINICAL HX: -ER- JAUNDICE WITH RUQ PAIN AND N/V X FEW DAYS. PT HAD LAP NIDA 04/23    PMH: DENIES    TECHNIQUE: 2D TRANSABDOMINAL ULTRASOUND OF THE ABDOMEN    TECHNOLOGIST: TERESA    COMPARISON:  04/18/2023    FINDINGS:  Liver: The hepatic parenchyma appears unremarkable with no worrisome focal masses or dilated biliary radicals.  Duplex imaging demonstrates adequate antegrade flow and waveforms within the portal vein, hepatic artery, and hepatic veins.    Gallbladder/biliary System: The patient is post cholecystectomy.  A 2.7 cm, complex fluid collection is noted within the gallbladder fossa.  There is  mild-to-moderate dilatation of the common bile duct (1.1 cm in greatest diameter) with no obvious intraductal stones appreciated.    Pancreas:Partially obscured by overlying bowel gas with no gross abnormalities noted.    Abdominal aorta:No clinically significant abnormalities are noted.    Inferior vena cava:No clinically significant abnormalities are noted.    Spleen:No clinically significant abnormalities are noted.    Kidneys: No clinically significant abnormalities are noted.    Miscellaneous: N/A                                       Medications   cloNIDine tablet 0.3 mg (0.3 mg Oral Given 6/15/23 1333)   iopamidoL (ISOVUE-300) injection 100 mL (100 mLs Intravenous Given 6/15/23 1644)   cloNIDine tablet 0.3 mg (0.3 mg Oral Given 6/15/23 1823)     Medical Decision Making:   Initial Assessment:   1. Hepatitis 2.  Hypertension   Differential Diagnosis:   Enteritis, hypertension uncontrolled  Clinical Tests:   Lab Tests: Ordered and Reviewed  The following lab test(s) were unremarkable: CBC and CMP  Radiological Study: Ordered and Reviewed  ED Management:  We will go ahead and draw blood for hepatitis workup.  Patient refuses her IV and hypertension medications.  Patient's lab work reveals a white count of 5.3.  Patient's liver function to all elevated including alk-phos and bilirubin.  I will go and order an ultrasound of liver to evaluate for any obstruction of common bile duct.  Common bile duct appears to be dilated and ultrasound as well as possible hematoma versus abscess in gallbladder fossa.  I will go ahead and order a CT of abdomen with contrast to evaluate these findings.  I will also call Dr. Krishna to discuss case with him.  Other:   I have discussed this case with another health care provider.       <> Summary of the Discussion: Dr. Krishna consulted regarding patient's obstructed common bile duct.  He requested the patient go and get a CT abdomen with contrast now as well as a HIDA scan and then  transfer for ERCP.           ED Course as of 06/16/23 0738   Thu Jose Alberto 15, 2023   1723 CT Abdomen Pelvis With Contrast [RB]      ED Course User Index  [RB] Reji Wang MD                 Clinical Impression:   Final diagnoses:  [R79.89] Elevated liver function tests  [R10.9] Abdominal pain, unspecified abdominal location  [K83.1] Common bile duct (CBD) obstruction (Primary)        ED Disposition Condition    Transfer to Another Facility Stable                Roe Fine MD  06/15/23 1712       Roe Fine MD  06/16/23 0738

## 2023-06-16 NOTE — ED NOTES
Patient stated she no longer wanted to wait and needed to get home to her children. Nurse explained to patient that she would be leaving against medical advice and that the MD recommendation is a transfer to [facility] for care. Patient verbalized understanding and still wanted to leave AMA. Patient signed paperwork and left

## 2023-06-17 ENCOUNTER — HOSPITAL ENCOUNTER (INPATIENT)
Facility: HOSPITAL | Age: 28
LOS: 1 days | Discharge: LEFT BEFORE DISCHARGE | DRG: 393 | End: 2023-06-18
Attending: EMERGENCY MEDICINE | Admitting: INTERNAL MEDICINE
Payer: MEDICAID

## 2023-06-17 DIAGNOSIS — E80.6 BILIRUBINEMIA: ICD-10-CM

## 2023-06-17 DIAGNOSIS — R74.01 TRANSAMINITIS: Primary | ICD-10-CM

## 2023-06-17 PROBLEM — T81.89XA BILOMA FOLLOWING SURGERY: Status: ACTIVE | Noted: 2023-06-17

## 2023-06-17 PROBLEM — K66.8 BILOMA FOLLOWING SURGERY: Status: ACTIVE | Noted: 2023-06-17

## 2023-06-17 LAB
ALBUMIN SERPL-MCNC: 3.7 G/DL (ref 3.5–5)
ALBUMIN/GLOB SERPL: 1.2 RATIO (ref 1.1–2)
ALP SERPL-CCNC: 327 UNIT/L (ref 40–150)
ALT SERPL-CCNC: 264 UNIT/L (ref 0–55)
APPEARANCE UR: CLEAR
AST SERPL-CCNC: 166 UNIT/L (ref 5–34)
B-HCG SERPL QL: NEGATIVE
BACTERIA #/AREA URNS AUTO: ABNORMAL /HPF
BASOPHILS # BLD AUTO: 0.04 X10(3)/MCL
BASOPHILS NFR BLD AUTO: 0.7 %
BILIRUB UR QL STRIP.AUTO: ABNORMAL MG/DL
BILIRUBIN DIRECT+TOT PNL SERPL-MCNC: 1.7 MG/DL (ref 0–?)
BILIRUBIN DIRECT+TOT PNL SERPL-MCNC: 2.8 MG/DL
BUN SERPL-MCNC: 10.7 MG/DL (ref 7–18.7)
CALCIUM SERPL-MCNC: 9.5 MG/DL (ref 8.4–10.2)
CHLORIDE SERPL-SCNC: 108 MMOL/L (ref 98–107)
CO2 SERPL-SCNC: 22 MMOL/L (ref 22–29)
COLOR UR: ABNORMAL
CREAT SERPL-MCNC: 0.92 MG/DL (ref 0.55–1.02)
CRP SERPL-MCNC: 1.5 MG/L
EOSINOPHIL # BLD AUTO: 0.36 X10(3)/MCL (ref 0–0.9)
EOSINOPHIL NFR BLD AUTO: 6.4 %
ERYTHROCYTE [DISTWIDTH] IN BLOOD BY AUTOMATED COUNT: 17.8 % (ref 11.5–17)
GFR SERPLBLD CREATININE-BSD FMLA CKD-EPI: >60 MLS/MIN/1.73/M2
GLOBULIN SER-MCNC: 3.1 GM/DL (ref 2.4–3.5)
GLUCOSE SERPL-MCNC: 111 MG/DL (ref 74–100)
GLUCOSE UR QL STRIP.AUTO: NEGATIVE MG/DL
HCT VFR BLD AUTO: 38.8 % (ref 37–47)
HGB BLD-MCNC: 12.6 G/DL (ref 12–16)
IMM GRANULOCYTES # BLD AUTO: 0.01 X10(3)/MCL (ref 0–0.04)
IMM GRANULOCYTES NFR BLD AUTO: 0.2 %
KETONES UR QL STRIP.AUTO: NEGATIVE MG/DL
LEUKOCYTE ESTERASE UR QL STRIP.AUTO: ABNORMAL UNIT/L
LIPASE SERPL-CCNC: 28 U/L
LYMPHOCYTES # BLD AUTO: 2.25 X10(3)/MCL (ref 0.6–4.6)
LYMPHOCYTES NFR BLD AUTO: 40.1 %
MCH RBC QN AUTO: 25.4 PG (ref 27–31)
MCHC RBC AUTO-ENTMCNC: 32.5 G/DL (ref 33–36)
MCV RBC AUTO: 78.2 FL (ref 80–94)
MONOCYTES # BLD AUTO: 0.42 X10(3)/MCL (ref 0.1–1.3)
MONOCYTES NFR BLD AUTO: 7.5 %
NEUTROPHILS # BLD AUTO: 2.53 X10(3)/MCL (ref 2.1–9.2)
NEUTROPHILS NFR BLD AUTO: 45.1 %
NITRITE UR QL STRIP.AUTO: NEGATIVE
NRBC BLD AUTO-RTO: 0 %
PH UR STRIP.AUTO: 7 [PH]
PLATELET # BLD AUTO: 520 X10(3)/MCL (ref 130–400)
PMV BLD AUTO: 10.5 FL (ref 7.4–10.4)
POTASSIUM SERPL-SCNC: 4.1 MMOL/L (ref 3.5–5.1)
PROT SERPL-MCNC: 6.8 GM/DL (ref 6.4–8.3)
PROT UR QL STRIP.AUTO: ABNORMAL MG/DL
RBC # BLD AUTO: 4.96 X10(6)/MCL (ref 4.2–5.4)
RBC #/AREA URNS AUTO: 68 /HPF
RBC UR QL AUTO: ABNORMAL UNIT/L
SODIUM SERPL-SCNC: 139 MMOL/L (ref 136–145)
SP GR UR STRIP.AUTO: 1.02 (ref 1–1.03)
SQUAMOUS #/AREA URNS AUTO: <5 /HPF
UROBILINOGEN UR STRIP-ACNC: >=8 MG/DL
WBC # SPEC AUTO: 5.61 X10(3)/MCL (ref 4.5–11.5)
WBC #/AREA URNS AUTO: 20 /HPF

## 2023-06-17 PROCEDURE — 83690 ASSAY OF LIPASE: CPT | Performed by: NURSE PRACTITIONER

## 2023-06-17 PROCEDURE — 86140 C-REACTIVE PROTEIN: CPT | Performed by: INTERNAL MEDICINE

## 2023-06-17 PROCEDURE — 63600175 PHARM REV CODE 636 W HCPCS: Performed by: NURSE PRACTITIONER

## 2023-06-17 PROCEDURE — 87088 URINE BACTERIA CULTURE: CPT | Performed by: NURSE PRACTITIONER

## 2023-06-17 PROCEDURE — 81001 URINALYSIS AUTO W/SCOPE: CPT | Performed by: NURSE PRACTITIONER

## 2023-06-17 PROCEDURE — 82248 BILIRUBIN DIRECT: CPT | Performed by: NURSE PRACTITIONER

## 2023-06-17 PROCEDURE — 96374 THER/PROPH/DIAG INJ IV PUSH: CPT

## 2023-06-17 PROCEDURE — 85025 COMPLETE CBC W/AUTO DIFF WBC: CPT | Performed by: NURSE PRACTITIONER

## 2023-06-17 PROCEDURE — 11000001 HC ACUTE MED/SURG PRIVATE ROOM

## 2023-06-17 PROCEDURE — 80053 COMPREHEN METABOLIC PANEL: CPT | Performed by: NURSE PRACTITIONER

## 2023-06-17 PROCEDURE — 81025 URINE PREGNANCY TEST: CPT | Performed by: NURSE PRACTITIONER

## 2023-06-17 PROCEDURE — 99285 EMERGENCY DEPT VISIT HI MDM: CPT | Mod: 25

## 2023-06-17 RX ORDER — SODIUM CHLORIDE 0.9 % (FLUSH) 0.9 %
10 SYRINGE (ML) INJECTION
Status: DISCONTINUED | OUTPATIENT
Start: 2023-06-17 | End: 2023-06-18 | Stop reason: HOSPADM

## 2023-06-17 RX ORDER — POLYETHYLENE GLYCOL 3350 17 G/17G
17 POWDER, FOR SOLUTION ORAL 2 TIMES DAILY PRN
Status: DISCONTINUED | OUTPATIENT
Start: 2023-06-17 | End: 2023-06-18 | Stop reason: HOSPADM

## 2023-06-17 RX ORDER — MAG HYDROX/ALUMINUM HYD/SIMETH 200-200-20
30 SUSPENSION, ORAL (FINAL DOSE FORM) ORAL EVERY 4 HOURS PRN
Status: DISCONTINUED | OUTPATIENT
Start: 2023-06-17 | End: 2023-06-18 | Stop reason: HOSPADM

## 2023-06-17 RX ORDER — ACETAMINOPHEN 325 MG/1
650 TABLET ORAL EVERY 4 HOURS PRN
Status: DISCONTINUED | OUTPATIENT
Start: 2023-06-17 | End: 2023-06-18 | Stop reason: HOSPADM

## 2023-06-17 RX ORDER — LISINOPRIL 10 MG/1
40 TABLET ORAL DAILY
Status: DISCONTINUED | OUTPATIENT
Start: 2023-06-18 | End: 2023-06-18 | Stop reason: HOSPADM

## 2023-06-17 RX ORDER — ONDANSETRON 2 MG/ML
4 INJECTION INTRAMUSCULAR; INTRAVENOUS
Status: COMPLETED | OUTPATIENT
Start: 2023-06-17 | End: 2023-06-17

## 2023-06-17 RX ORDER — AMLODIPINE BESYLATE 5 MG/1
5 TABLET ORAL DAILY
Status: DISCONTINUED | OUTPATIENT
Start: 2023-06-18 | End: 2023-06-18 | Stop reason: HOSPADM

## 2023-06-17 RX ORDER — PROCHLORPERAZINE EDISYLATE 5 MG/ML
5 INJECTION INTRAMUSCULAR; INTRAVENOUS EVERY 6 HOURS PRN
Status: DISCONTINUED | OUTPATIENT
Start: 2023-06-17 | End: 2023-06-18 | Stop reason: HOSPADM

## 2023-06-17 RX ORDER — METOPROLOL SUCCINATE 50 MG/1
200 TABLET, EXTENDED RELEASE ORAL 2 TIMES DAILY
Status: DISCONTINUED | OUTPATIENT
Start: 2023-06-17 | End: 2023-06-18 | Stop reason: HOSPADM

## 2023-06-17 RX ORDER — ONDANSETRON 2 MG/ML
4 INJECTION INTRAMUSCULAR; INTRAVENOUS EVERY 4 HOURS PRN
Status: DISCONTINUED | OUTPATIENT
Start: 2023-06-17 | End: 2023-06-18 | Stop reason: HOSPADM

## 2023-06-17 RX ORDER — HYDRALAZINE HYDROCHLORIDE 20 MG/ML
10 INJECTION INTRAMUSCULAR; INTRAVENOUS EVERY 4 HOURS PRN
Status: DISCONTINUED | OUTPATIENT
Start: 2023-06-17 | End: 2023-06-18 | Stop reason: HOSPADM

## 2023-06-17 RX ADMIN — ONDANSETRON 4 MG: 2 INJECTION INTRAMUSCULAR; INTRAVENOUS at 03:06

## 2023-06-17 NOTE — FIRST PROVIDER EVALUATION
Medical screening examination initiated.  I have conducted a focused provider triage encounter, findings are as follows:    Brief history of present illness:  27-year-old female with a history of hypertension presents complaining of abdominal pain especially when laying right side.  She was seen at [facility] 2 days ago and was told that she needed to go to [facility] for further evaluation due to obstruction of common bile duct versus abscess versus hematoma by GI.  Patient had cholecystectomy back in April of this year.    Vitals:    06/17/23 1535   BP: (!) 177/116   BP Location: Left arm   Patient Position: Sitting   Pulse: (!) 120   Resp: 20   Temp: 98.2 °F (36.8 °C)   TempSrc: Tympanic   SpO2: 99%       Pertinent physical exam:  AAO x3    Brief workup plan:  Labs    Preliminary workup initiated; this workup will be continued and followed by the physician or advanced practice provider that is assigned to the patient when roomed.

## 2023-06-17 NOTE — ED PROVIDER NOTES
Encounter Date: 6/17/2023       History     Chief Complaint   Patient presents with    Abdominal Pain     States she was admitted at [facility] 2d ago and was told possible blocked bile duct, what supposed to be admitted but instead was d/c and told to come here 2d ago. Review of chart shows gallbladder was removed in April and concern on ct was obstruction of bile duct, versus abscess, versus hematoma.      See MDM    The history is provided by the patient. No  was used.   Review of patient's allergies indicates:  No Known Allergies  Past Medical History:   Diagnosis Date    Hypertension      Past Surgical History:   Procedure Laterality Date    CHOLECYSTECTOMY      LAPAROSCOPIC CHOLECYSTECTOMY N/A 04/13/2023    Procedure: CHOLECYSTECTOMY, LAPAROSCOPIC;  Surgeon: Екатерина Krishna MD;  Location: [facility] OR;  Service: General;  Laterality: N/A;     No family history on file.  Social History     Tobacco Use    Smoking status: Never    Smokeless tobacco: Never   Substance Use Topics    Alcohol use: Never    Drug use: Never     Review of Systems   Constitutional:  Negative for fever.   Respiratory:  Negative for cough and shortness of breath.    Cardiovascular:  Negative for chest pain.   Gastrointestinal:  Negative for abdominal pain.   Genitourinary:  Negative for difficulty urinating and dysuria.   Musculoskeletal:  Negative for gait problem.   Skin:  Negative for color change.        Jaundice     Neurological:  Negative for dizziness, speech difficulty and headaches.   Psychiatric/Behavioral:  Negative for hallucinations and suicidal ideas.    All other systems reviewed and are negative.    Physical Exam     Initial Vitals [06/17/23 1535]   BP Pulse Resp Temp SpO2   (!) 177/116 (!) 120 20 98.2 °F (36.8 °C) 99 %      MAP       --         Physical Exam    Nursing note and vitals reviewed.  Constitutional: She appears well-developed and well-nourished.   HENT:   Head: Normocephalic.   Eyes: EOM  are normal.   Neck:   Normal range of motion.  Cardiovascular:  Normal rate, regular rhythm, normal heart sounds and intact distal pulses.           Pulmonary/Chest: Breath sounds normal. No respiratory distress.   Abdominal: Abdomen is soft. Bowel sounds are normal. There is no abdominal tenderness.   Musculoskeletal:         General: Normal range of motion.      Cervical back: Normal range of motion.     Neurological: She is alert and oriented to person, place, and time. She has normal strength.   Skin: Skin is warm and dry.   Psychiatric: She has a normal mood and affect. Her behavior is normal. Judgment and thought content normal.       ED Course   Procedures  Labs Reviewed   COMPREHENSIVE METABOLIC PANEL - Abnormal; Notable for the following components:       Result Value    Chloride 108 (*)     Glucose Level 111 (*)     Bilirubin Total 2.8 (*)     Alkaline Phosphatase 327 (*)     Alanine Aminotransferase 264 (*)     Aspartate Aminotransferase 166 (*)     All other components within normal limits   URINALYSIS, REFLEX TO URINE CULTURE - Abnormal; Notable for the following components:    Protein, UA Trace (*)     Blood, UA 3+ (*)     Bilirubin, UA 2+ (*)     Urobilinogen, UA >=8.0 (*)     Leukocyte Esterase, UA 2+ (*)     All other components within normal limits   CBC WITH DIFFERENTIAL - Abnormal; Notable for the following components:    MCV 78.2 (*)     MCH 25.4 (*)     MCHC 32.5 (*)     RDW 17.8 (*)     Platelet 520 (*)     MPV 10.5 (*)     All other components within normal limits   BILIRUBIN, DIRECT - Abnormal; Notable for the following components:    Bilirubin Direct 1.7 (*)     All other components within normal limits   URINALYSIS, MICROSCOPIC - Abnormal; Notable for the following components:    RBC, UA 68 (*)     WBC, UA 20 (*)     All other components within normal limits   LIPASE - Normal   PREGNANCY TEST, URINE RAPID - Normal   CULTURE, URINE   CBC W/ AUTO DIFFERENTIAL    Narrative:     The following  orders were created for panel order CBC auto differential.  Procedure                               Abnormality         Status                     ---------                               -----------         ------                     CBC with Differential[[id number]]        Abnormal            Final result                 Please view results for these tests on the individual orders.          Imaging Results    None          Medications   ondansetron injection 4 mg (4 mg Intravenous Given 6/17/23 1792)     Medical Decision Making:   Initial Assessment:   Historian:  Patient.  Patient is a 27-year-old female  that presents with jaundice that has been present a few days. Associated symptoms abdominal pain that is resolved, vomiting that is resolved. Surrounding information is patient was seen at an outside ER 2 days ago and was to be transferred here.  There was concern for a biliary occlusion.  She left AMA. Exacerbated by nothing. Relieved by nothing. Patient treatment prior to arrival none. Risk factors include none. Other history pertaining to this complaint nothing.   Assessment:  See physical exam.    Differential Diagnosis:   Jaundice, biliary occlusion, gallstone  ED Management:  History was obtained.  Physical was performed.  Patient does have transaminitis and elevated bilirubin.  This is somewhat improved from her visit to the outside ER.  I did speak to GI nurse practitioner Kerri.  They will consult on the patient.  Patient will be admitted to Hospital Medicine. I did speak to Mery VASQUEZ with Hospital Medicine and she accepts admission.  I did speak to Dr. De La Fuente in the emergency room physician.  He agrees with this admission and will see patient in the face-to-face interaction.  No social determinants that affect healthcare were noted.           ED Course as of 06/17/23 2017   Sat Jun 17, 2023   1815 GI paged [CL]      ED Course User Index  [CL] RUFINO Truong                 Clinical Impression:    Final diagnoses:  [R74.01] Transaminitis (Primary)  [E80.6] Bilirubinemia        ED Disposition Condition    Admit Stable                RUFINO Truong  06/17/23 2017

## 2023-06-18 VITALS
TEMPERATURE: 98 F | DIASTOLIC BLOOD PRESSURE: 99 MMHG | SYSTOLIC BLOOD PRESSURE: 135 MMHG | RESPIRATION RATE: 16 BRPM | OXYGEN SATURATION: 100 % | HEART RATE: 68 BPM

## 2023-06-18 LAB
ALBUMIN SERPL-MCNC: 3.7 G/DL (ref 3.5–5)
ALBUMIN/GLOB SERPL: 1.2 RATIO (ref 1.1–2)
ALP SERPL-CCNC: 363 UNIT/L (ref 40–150)
ALT SERPL-CCNC: 277 UNIT/L (ref 0–55)
AST SERPL-CCNC: 172 UNIT/L (ref 5–34)
BASOPHILS # BLD AUTO: 0.04 X10(3)/MCL
BASOPHILS NFR BLD AUTO: 0.7 %
BILIRUBIN DIRECT+TOT PNL SERPL-MCNC: 2.7 MG/DL
BUN SERPL-MCNC: 8.8 MG/DL (ref 7–18.7)
CALCIUM SERPL-MCNC: 9.6 MG/DL (ref 8.4–10.2)
CHLORIDE SERPL-SCNC: 104 MMOL/L (ref 98–107)
CO2 SERPL-SCNC: 23 MMOL/L (ref 22–29)
CREAT SERPL-MCNC: 0.72 MG/DL (ref 0.55–1.02)
EOSINOPHIL # BLD AUTO: 0.43 X10(3)/MCL (ref 0–0.9)
EOSINOPHIL NFR BLD AUTO: 7.9 %
ERYTHROCYTE [DISTWIDTH] IN BLOOD BY AUTOMATED COUNT: 17.8 % (ref 11.5–17)
GFR SERPLBLD CREATININE-BSD FMLA CKD-EPI: >60 MLS/MIN/1.73/M2
GLOBULIN SER-MCNC: 3.2 GM/DL (ref 2.4–3.5)
GLUCOSE SERPL-MCNC: 93 MG/DL (ref 74–100)
HCT VFR BLD AUTO: 40.6 % (ref 37–47)
HGB BLD-MCNC: 13.1 G/DL (ref 12–16)
IMM GRANULOCYTES # BLD AUTO: 0.01 X10(3)/MCL (ref 0–0.04)
IMM GRANULOCYTES NFR BLD AUTO: 0.2 %
LYMPHOCYTES # BLD AUTO: 2.34 X10(3)/MCL (ref 0.6–4.6)
LYMPHOCYTES NFR BLD AUTO: 43.3 %
MCH RBC QN AUTO: 25.3 PG (ref 27–31)
MCHC RBC AUTO-ENTMCNC: 32.3 G/DL (ref 33–36)
MCV RBC AUTO: 78.4 FL (ref 80–94)
MONOCYTES # BLD AUTO: 0.36 X10(3)/MCL (ref 0.1–1.3)
MONOCYTES NFR BLD AUTO: 6.7 %
NEUTROPHILS # BLD AUTO: 2.23 X10(3)/MCL (ref 2.1–9.2)
NEUTROPHILS NFR BLD AUTO: 41.2 %
NRBC BLD AUTO-RTO: 0 %
PLATELET # BLD AUTO: 581 X10(3)/MCL (ref 130–400)
PMV BLD AUTO: 10.5 FL (ref 7.4–10.4)
POTASSIUM SERPL-SCNC: 3.9 MMOL/L (ref 3.5–5.1)
PROT SERPL-MCNC: 6.9 GM/DL (ref 6.4–8.3)
RBC # BLD AUTO: 5.18 X10(6)/MCL (ref 4.2–5.4)
SODIUM SERPL-SCNC: 138 MMOL/L (ref 136–145)
WBC # SPEC AUTO: 5.41 X10(3)/MCL (ref 4.5–11.5)

## 2023-06-18 PROCEDURE — 80053 COMPREHEN METABOLIC PANEL: CPT

## 2023-06-18 PROCEDURE — 25000003 PHARM REV CODE 250: Performed by: NURSE PRACTITIONER

## 2023-06-18 PROCEDURE — 85025 COMPLETE CBC W/AUTO DIFF WBC: CPT

## 2023-06-18 PROCEDURE — 63600175 PHARM REV CODE 636 W HCPCS: Performed by: NURSE PRACTITIONER

## 2023-06-18 RX ORDER — LABETALOL HYDROCHLORIDE 5 MG/ML
10 INJECTION, SOLUTION INTRAVENOUS EVERY 4 HOURS PRN
Status: DISCONTINUED | OUTPATIENT
Start: 2023-06-18 | End: 2023-06-18 | Stop reason: HOSPADM

## 2023-06-18 RX ADMIN — AMLODIPINE BESYLATE 5 MG: 5 TABLET ORAL at 10:06

## 2023-06-18 RX ADMIN — METOPROLOL SUCCINATE 200 MG: 50 TABLET, EXTENDED RELEASE ORAL at 12:06

## 2023-06-18 RX ADMIN — METOPROLOL SUCCINATE 200 MG: 50 TABLET, EXTENDED RELEASE ORAL at 10:06

## 2023-06-18 RX ADMIN — LISINOPRIL 40 MG: 10 TABLET ORAL at 10:06

## 2023-06-18 RX ADMIN — HYDRALAZINE HYDROCHLORIDE 10 MG: 20 INJECTION INTRAMUSCULAR; INTRAVENOUS at 04:06

## 2023-06-18 NOTE — CONSULTS
Gastroenterology Consult    CC:  Elevated liver enzymes.  Abnormal abdominal imaging.    HPI:  This is a 27 y.o. female history of laparoscopic cholecystectomy on 04/14 who presents to the ER with concern for looking jaundiced and found to have abnormal LFTs.  She reports development of intermittent right upper quadrant pain over the last few weeks and noted to be jaundiced which prompted presentation to an outside hospital on 06/15.  On imaging at that time, she was found to have 3 cm fluid collection in the gallbladder fossa with 1.5 cm dilated common bile duct.  There was no evidence of obstruction on the CT scan.  She left the ER on 06/15 a may due to personal reasons and presented today to [facility] with continued jaundice.  She denies any significant family or personal history of liver disease or GI malignancy.  She is endoscopically naive.  HIDA scan is ordered and the surgical services have been consulted.  Review of systems otherwise unremarkable.    Past Medical History:   Diagnosis Date    Hypertension        Past Surgical History:   Procedure Laterality Date    CHOLECYSTECTOMY      LAPAROSCOPIC CHOLECYSTECTOMY N/A 04/13/2023    Procedure: CHOLECYSTECTOMY, LAPAROSCOPIC;  Surgeon: Екатерина Krishna MD;  Location: [facility];  Service: General;  Laterality: N/A;       Social History     Tobacco Use    Smoking status: Never    Smokeless tobacco: Never   Substance Use Topics    Alcohol use: Never    Drug use: Never       No family history on file.    PCP: RUFINO Anderson    Review of patient's allergies indicates:  No Known Allergies     Current Facility-Administered Medications   Medication Dose Route Frequency Provider Last Rate Last Admin    acetaminophen tablet 650 mg  650 mg Oral Q4H PRN RUFINO Patel        acetaminophen tablet 650 mg  650 mg Oral Q4H PRN RUFINO Patel        aluminum-magnesium hydroxide-simethicone 200-200-20 mg/5 mL suspension 30 mL  30 mL Oral Q4H PRN RUFINO Patel         amLODIPine tablet 5 mg  5 mg Oral Daily Mery Peralta, FNP        hydrALAZINE injection 10 mg  10 mg Intravenous Q4H PRN Mery Peralta, FNP   10 mg at 06/18/23 0454    labetaloL injection 10 mg  10 mg Intravenous Q4H PRN Mery DOWNS Kathryn, FNP        lisinopriL tablet 40 mg  40 mg Oral Daily Mery Corneliusch, FNP        metoprolol succinate (TOPROL-XL) 24 hr tablet 200 mg  200 mg Oral BID Mery D Kathryn, FNP   200 mg at 06/18/23 0021    ondansetron injection 4 mg  4 mg Intravenous Q4H PRN Mery DOWNS Kathryn, FNP        polyethylene glycol packet 17 g  17 g Oral BID PRN Mery Peralta, FNP        prochlorperazine injection Soln 5 mg  5 mg Intravenous Q6H PRN Mery DOWNS Kathryn, FNP        sodium chloride 0.9% flush 10 mL  10 mL Intravenous PRN Mery Peralta, FNP        trazodone split tablet 25 mg  25 mg Oral Nightly PRN Mery Peralta, FNP         Current Outpatient Medications   Medication Sig Dispense Refill    amLODIPine (NORVASC) 5 MG tablet Take 1 tablet (5 mg total) by mouth once daily. 30 tablet 0    ciprofloxacin HCl (CIPRO) 500 MG tablet Take 1 tablet (500 mg total) by mouth every 12 (twelve) hours. 10 tablet 0    hydroCHLOROthiazide (HYDRODIURIL) 12.5 MG Tab Take 1 tablet (12.5 mg total) by mouth once daily. 30 tablet 0    lisinopriL (PRINIVIL,ZESTRIL) 40 MG tablet Take 1 tablet (40 mg total) by mouth once daily. 90 tablet 0    metoprolol succinate (TOPROL-XL) 200 MG 24 hr tablet Take 1 tablet (200 mg total) by mouth 2 (two) times daily. 60 tablet 0       (Not in a hospital admission)        Review of Systems  General ROS: negative for - chills, fever or weight loss  Psychological ROS: negative for - hallucination, depression or suicidal ideation  Ophthalmic ROS: negative for - blurry vision, photophobia or eye pain  ENT ROS: negative for - epistaxis, sore throat or rhinorrhea  Respiratory ROS: no cough, shortness of breath, or wheezing  Cardiovascular ROS: no chest pain or dyspnea on exertion  Gastrointestinal ROS:   Jaundice.  Right upper quadrant pain.  Genito-Urinary ROS: no dysuria, trouble voiding, or hematuria  Musculoskeletal ROS: negative for - gait disturbance or muscular weakness  Neurological ROS: no syncope or seizures; no ataxia  Dermatological ROS: negative for pruritis, rash and jaundice    Physical Examination  BP (!) 155/100   Pulse 68   Temp 98.2 °F (36.8 °C) (Tympanic)   Resp 18   SpO2 100%   General appearance: alert, cooperative, no distress  HENT: Normocephalic, atraumatic, neck symmetrical, no nasal discharge   Eyes:  Scleral icterus.  PERRL, EOM's intact  Lungs: no respiratory distress. Comfortable on RA.  Heart: regular rate. No edema.  Abdomen:  Soft.  Nontender.  Extremities: extremities symmetric; no clubbing, cyanosis, or edema  Integument: Skin color, texture, turgor normal; no jaundice  Neurologic: Alert and oriented X 3, normal strength, normal coordination and gait  Psychiatric: no pressured speech; normal affect; no evidence of impaired cognition       Recent Results (from the past 48 hour(s))   Comprehensive metabolic panel    Collection Time: 06/17/23  4:00 PM   Result Value Ref Range    Sodium Level 139 136 - 145 mmol/L    Potassium Level 4.1 3.5 - 5.1 mmol/L    Chloride 108 (H) 98 - 107 mmol/L    Carbon Dioxide 22 22 - 29 mmol/L    Glucose Level 111 (H) 74 - 100 mg/dL    Blood Urea Nitrogen 10.7 7.0 - 18.7 mg/dL    Creatinine 0.92 0.55 - 1.02 mg/dL    Calcium Level Total 9.5 8.4 - 10.2 mg/dL    Protein Total 6.8 6.4 - 8.3 gm/dL    Albumin Level 3.7 3.5 - 5.0 g/dL    Globulin 3.1 2.4 - 3.5 gm/dL    Albumin/Globulin Ratio 1.2 1.1 - 2.0 ratio    Bilirubin Total 2.8 (H) <=1.5 mg/dL    Alkaline Phosphatase 327 (H) 40 - 150 unit/L    Alanine Aminotransferase 264 (H) 0 - 55 unit/L    Aspartate Aminotransferase 166 (H) 5 - 34 unit/L    eGFR >60 mls/min/1.73/m2   Lipase    Collection Time: 06/17/23  4:00 PM   Result Value Ref Range    Lipase Level 28 <=60 U/L   CBC with Differential    Collection  Time: 06/17/23  4:00 PM   Result Value Ref Range    WBC 5.61 4.50 - 11.50 x10(3)/mcL    RBC 4.96 4.20 - 5.40 x10(6)/mcL    Hgb 12.6 12.0 - 16.0 g/dL    Hct 38.8 37.0 - 47.0 %    MCV 78.2 (L) 80.0 - 94.0 fL    MCH 25.4 (L) 27.0 - 31.0 pg    MCHC 32.5 (L) 33.0 - 36.0 g/dL    RDW 17.8 (H) 11.5 - 17.0 %    Platelet 520 (H) 130 - 400 x10(3)/mcL    MPV 10.5 (H) 7.4 - 10.4 fL    Neut % 45.1 %    Lymph % 40.1 %    Mono % 7.5 %    Eos % 6.4 %    Basophil % 0.7 %    Lymph # 2.25 0.6 - 4.6 x10(3)/mcL    Neut # 2.53 2.1 - 9.2 x10(3)/mcL    Mono # 0.42 0.1 - 1.3 x10(3)/mcL    Eos # 0.36 0 - 0.9 x10(3)/mcL    Baso # 0.04 <=0.2 x10(3)/mcL    IG# 0.01 0 - 0.04 x10(3)/mcL    IG% 0.2 %    NRBC% 0.0 %   Bilirubin, Direct    Collection Time: 06/17/23  4:00 PM   Result Value Ref Range    Bilirubin Direct 1.7 (H) 0.0 - <0.5 mg/dL   C-Reactive Protein    Collection Time: 06/17/23  4:00 PM   Result Value Ref Range    C-Reactive Protein 1.50 <5.00 mg/L   Urinalysis, Reflex to Urine Culture    Collection Time: 06/17/23  6:09 PM    Specimen: Urine   Result Value Ref Range    Color, UA Dark Yellow Yellow, Light-Yellow, Dark Yellow, Letha, Straw    Appearance, UA Clear Clear    Specific Gravity, UA 1.020 1.005 - 1.030    pH, UA 7.0 5.0 - 8.5    Protein, UA Trace (A) Negative mg/dL    Glucose, UA Negative Negative, Normal mg/dL    Ketones, UA Negative Negative mg/dL    Blood, UA 3+ (A) Negative unit/L    Bilirubin, UA 2+ (A) Negative mg/dL    Urobilinogen, UA >=8.0 (A) 0.2, 1.0, Normal mg/dL    Nitrites, UA Negative Negative    Leukocyte Esterase, UA 2+ (A) Negative unit/L   Pregnancy, urine rapid    Collection Time: 06/17/23  6:09 PM   Result Value Ref Range    Beta hCG Qualitative, Urine Negative Negative   Urinalysis, Microscopic    Collection Time: 06/17/23  6:09 PM   Result Value Ref Range    RBC, UA 68 (H) <=5 /HPF    WBC, UA 20 (H) <=5 /HPF    Squamous Epithelial Cells, UA <5 <=5 /HPF    Bacteria, UA None Seen None Seen, Rare, Occasional  /HPF   Urine culture    Collection Time: 06/17/23  6:09 PM    Specimen: Urine   Result Value Ref Range    Urine Culture No Growth At 24 Hours    Comprehensive Metabolic Panel    Collection Time: 06/18/23  8:07 AM   Result Value Ref Range    Sodium Level 138 136 - 145 mmol/L    Potassium Level 3.9 3.5 - 5.1 mmol/L    Chloride 104 98 - 107 mmol/L    Carbon Dioxide 23 22 - 29 mmol/L    Glucose Level 93 74 - 100 mg/dL    Blood Urea Nitrogen 8.8 7.0 - 18.7 mg/dL    Creatinine 0.72 0.55 - 1.02 mg/dL    Calcium Level Total 9.6 8.4 - 10.2 mg/dL    Protein Total 6.9 6.4 - 8.3 gm/dL    Albumin Level 3.7 3.5 - 5.0 g/dL    Globulin 3.2 2.4 - 3.5 gm/dL    Albumin/Globulin Ratio 1.2 1.1 - 2.0 ratio    Bilirubin Total 2.7 (H) <=1.5 mg/dL    Alkaline Phosphatase 363 (H) 40 - 150 unit/L    Alanine Aminotransferase 277 (H) 0 - 55 unit/L    Aspartate Aminotransferase 172 (H) 5 - 34 unit/L    eGFR >60 mls/min/1.73/m2   CBC with Differential    Collection Time: 06/18/23  8:07 AM   Result Value Ref Range    WBC 5.41 4.50 - 11.50 x10(3)/mcL    RBC 5.18 4.20 - 5.40 x10(6)/mcL    Hgb 13.1 12.0 - 16.0 g/dL    Hct 40.6 37.0 - 47.0 %    MCV 78.4 (L) 80.0 - 94.0 fL    MCH 25.3 (L) 27.0 - 31.0 pg    MCHC 32.3 (L) 33.0 - 36.0 g/dL    RDW 17.8 (H) 11.5 - 17.0 %    Platelet 581 (H) 130 - 400 x10(3)/mcL    MPV 10.5 (H) 7.4 - 10.4 fL    Neut % 41.2 %    Lymph % 43.3 %    Mono % 6.7 %    Eos % 7.9 %    Basophil % 0.7 %    Lymph # 2.34 0.6 - 4.6 x10(3)/mcL    Neut # 2.23 2.1 - 9.2 x10(3)/mcL    Mono # 0.36 0.1 - 1.3 x10(3)/mcL    Eos # 0.43 0 - 0.9 x10(3)/mcL    Baso # 0.04 <=0.2 x10(3)/mcL    IG# 0.01 0 - 0.04 x10(3)/mcL    IG% 0.2 %    NRBC% 0.0 %       US Abdomen Complete    Result Date: 6/15/2023  EXAMINATION: STUDY: US ABDOMEN COMPLETE CLINICAL HISTORY AND TECHNIQUE: RT Nara on 6/15/2023  2:59 PM CLINICAL HX: -ER- JAUNDICE WITH RUQ PAIN AND N/V X FEW DAYS. PT HAD LAP NIDA 04/23 PMH: CLAIRE TECHNIQUE: 2D TRANSABDOMINAL ULTRASOUND OF THE  ABDOMEN TECHNOLOGIST: TERESA COMPARISON: 04/18/2023 FINDINGS: Liver: The hepatic parenchyma appears unremarkable with no worrisome focal masses or dilated biliary radicals.  Duplex imaging demonstrates adequate antegrade flow and waveforms within the portal vein, hepatic artery, and hepatic veins. Gallbladder/biliary System: The patient is post cholecystectomy.  A 2.7 cm, complex fluid collection is noted within the gallbladder fossa.  There is mild-to-moderate dilatation of the common bile duct (1.1 cm in greatest diameter) with no obvious intraductal stones appreciated. Pancreas:Partially obscured by overlying bowel gas with no gross abnormalities noted. Abdominal aorta:No clinically significant abnormalities are noted. Inferior vena cava:No clinically significant abnormalities are noted. Spleen:No clinically significant abnormalities are noted. Kidneys: No clinically significant abnormalities are noted. Miscellaneous: N/A     1. The patient is post cholecystectomy. 2. A 2.7 cm, complex fluid collection is noted within the gallbladder fossa.  This may simply represent a postoperative hematoma or even loop of bowel, however, possibility of an abscess cannot be excluded based on this exam.  Further workup with a contrast enhanced CT scan of the abdomen may be helpful for better evaluation if felt be clinically necessary. 3. There is mild to moderate dilatation of the common bile duct (1.1 cm) with no obvious intraductal stones noted.  A CT scan of the abdomen may also be helpful for better evaluation of the distal common bile duct and ampulla of Vater and to exclude the possibility of a distal intraductal stone. Electronically signed by: Tyshawn Reynolds Date:    06/15/2023 Time:    15:15    CT Abdomen Pelvis With Contrast    Result Date: 6/15/2023  EXAMINATION: STUDY:CT ABDOMEN PELVIS WITH CONTRAST CLINICAL HISTORY AND TECHNIQUE: RT Uche on 6/15/2023  4:51 PM PATIENT STATUS: ER PROCEDURE:  CT ABD/PEL W/CONTRAST  ULTRASOUND DONE TODAY CLINICAL HX: JAUNDICE , RUQ PAIN , NAUSEA, VOMITING X A FEW DAYS, PT HAD LAP NIDA, ON 4/23/2023 PMH: NIDA, HTN IV CONTRAST: 100CC ISOVUE 300 ORAL CONTRAST: NONE RECTAL CONTRAST:NONE AXIAL IMAGES @ 5MM INTERVALS WITH MULTI PLANAR RECONSTRUCTION OF IMAGES TOTAL IMAGE NUMBER : [id number] NUMBER OF CT SCANS LAST 12 MONHTS: 2 CTDIvol(mGy) : HEAD:        BODY: 18.80 DLP (mGycm) : HEAD :       BODY: 943.20 TECH : DB PT TRANSPORTED W/O INCIDENT This patient has had two CT and nuclear medicine scans performed within the last 12 months. The following DOSE REDUCTION TECHNIQUES are used for all CT scans at [facility]: 1. Automated exposure control. 2. Adjustment of the mA and/or kv according to patient size. 3. Use of iterative reconstruction technique. COMPARISON: 04/09/2023 FINDINGS: Liver: There is mild dilatation of intrahepatic biliary radicles.  No additional hepatic abnormalities are noted. Gallbladder/biliary system: Surgical clips are noted within the gallbladder fossa compatible with patient's history of a recent cholecystectomy.  A 3 cm cystic structure containing internal echogenic debris and demonstrating no significant perfusion on color-flow mapping is noted within the gallbladder fossa.  There is notable dilatation of the common bile duct which measures 1.5 cm in greatest diameter with no obvious intraductal stone or evidence of a mass within the head of the pancreas or region of the ampulla of Vater. Spleen: No clinically significant abnormalities are noted. Adrenal glands: No clinically significant abnormalities are noted. Pancreas: No clinically significant abnormalities are noted. Kidneys/ureters: No clinically significant abnormalities are noted. Urinary bladder: The urinary bladder is poorly distended with no gross abnormalities noted on limited imaging. Prostate gland/uterus and ovaries: An IUD is present appears well position within the endometrial cavity.  The  uterus and ovaries appear otherwise unremarkable. GI tract: Unopacified loops of large and small bowel as well as the gastric lumen and appendix are difficult to evaluate with no definite abnormalities appreciated. Vascular structures: No clinically significant abnormalities are noted. Musculoskeletal structures: No clinically significant abnormalities are noted. Miscellaneous: N/A     1. Postoperative changes are present compatible with patient's history of a recent cholecystectomy. 2. A 3 cm cystic structure containing internal echogenic debris and demonstrating no significant perfusion on color-flow mapping is noted within the gallbladder fossa.  I suspect this represents a postoperative hematoma versus less likely an atypical appearing abscess.  Clinical correlation is recommended. 3. There is mild dilatation of intrahepatic biliary radicles and notable dilatation of the common bile duct which measures 1.5 cm in greatest diameter with no obvious intraductal stone or evidence of a mass within the head of the pancreas or region of the ampulla of Vater.  The possibility of obstruction of the distal common bile duct with a non radiopaque stone should be excluded clinically and correlation with the patient's serum bilirubin level is recommended. Electronically signed by: Tyshawn Reynolds Date:    06/15/2023 Time:    16:59      I have personally reviewed these labs and images    Assessment:   27-year-old female with a history of recent cholecystectomy in April who presents with intermittent right upper quadrant pain, jaundice, elevated LFTs, and abnormal abdominal imaging.  She does have a 3 cm fluid collection in the gallbladder fossa, unclear at this time if this is a biloma or other postoperative collection. She has no elevated white count to suspect infectious component today.  She does have a dilated common bile duct though no abdominal imaging post cholecystectomy to determine if this is simply post cholec.   ystectomy changes, LFTs to be abnormal in the setting of a biloma We are pending the results of the HIDA scan to determine if there is evidence of a possible leak and we can also evaluate if there is adequate tracer extruding into the small bowel.  If there are any questions on the HIDA scan, we can obtain an MRCP to further evaluate.  I defer to our surgical colleagues on management of the 3 cm fluid collection.    Plan:  Continue supportive care  Okay for regular diet from a GI standpoint  Repeat LFTs in the morning  We will follow up the results of the HIDA scan tomorrow asofía.    Jose Bello MD  Gastroenterology

## 2023-06-18 NOTE — H&P
[facility] Hospital Medicine   History & Physical Note      Patient Name: Tony Ruggiero  : 1995  MRN: [id number]  PCP: RUFINO Anderson  Admitting Service: Hospital Medicine  Attending Physician: Shyla Grossman MD  Admission Date: 2023 - IP- Inpatient   Length of Stay: 0  History source: EMR, patient and/or patient's family  Code status: Full    Chief Complaint   Abdominal Pain (States she was admitted at [facility] 2d ago and was told possible blocked bile duct, what supposed to be admitted but instead was d/c and told to come here 2d ago. Review of chart shows gallbladder was removed in April and concern on ct was obstruction of bile duct, versus abscess, versus hematoma. )      History of Present Illness   27-year-old female who underwent a cholecystectomy with CLYDE drain placement on 2023 with removal of CLYDE drain 1 week post-op presents to the ED for possible bile leak.  Patient initially presented to [place] ED 2 days ago for scleral icterus and underwent a CT abdomen and pelvis that showed a 3 cm cystic structure within the gallbladder fossa concerning for postoperative hematoma versus less likely an atypical appearing abscess.  There was also CBD dilation 1.5 cm with no obvious stone/mass.  Labs at that time showed a bilirubin of 3.4, direct bilirubin 2.2, , , alk-phos 375 She was awaiting transfer her but was taking too long so she left AMA.     Upon arrival into the ED patient was hypertensive with a blood pressure of 177/116, pulse 120 but otherwise hemodynamically stable and afebrile.  Laboratory tonight showed improving hyperbilirubinemia and transaminitis with a total bilirubin of 2.8, direct 1.7, , , alk-phos 327.  GI was consulted and recommended admission to hospitalist service for further workup.    Patient seen and examined tonight, she denies any complaints, does report green stool, no diarrhea, fever,  chills, nausea or vomiting.      ROS   Except as documented, all other systems reviewed and negative     Past Medical History   Essential hypertension  Postpartum preeclampsia  Morbid obesity    Past Surgical History   Laparoscopic cholecystectomy with CLYDE drain placement on 04/13/2023    Social History   Patient denies alcohol, tobacco or illicit drug use    Family History   Reviewed and negative    Allergies   Patient has no known allergies.    Home Medications     Prior to Admission medications    Medication Sig Start Date End Date Taking? Authorizing Provider   amLODIPine (NORVASC) 5 MG tablet Take 1 tablet (5 mg total) by mouth once daily. 4/19/23 4/18/24 Yes Papo Webster MD   ciprofloxacin HCl (CIPRO) 500 MG tablet Take 1 tablet (500 mg total) by mouth every 12 (twelve) hours. 4/18/23  Yes Papo Webster MD   hydroCHLOROthiazide (HYDRODIURIL) 12.5 MG Tab Take 1 tablet (12.5 mg total) by mouth once daily. 4/19/23 4/18/24 Yes Papo Webster MD   lisinopriL (PRINIVIL,ZESTRIL) 40 MG tablet Take 1 tablet (40 mg total) by mouth once daily. 4/19/23 4/18/24 Yes Papo Webster MD   metoprolol succinate (TOPROL-XL) 200 MG 24 hr tablet Take 1 tablet (200 mg total) by mouth 2 (two) times daily. 4/18/23 4/17/24 Yes Papo Webster MD          Physical Exam   Vital Signs  Temp:  [98.2 °F (36.8 °C)]   Pulse:  []   Resp:  [10-23]   BP: (156-185)/(102-116)   SpO2:  [96 %-100 %]    General: Appears comfortable, in no acute distress  HEENT: NC/AT, mild scleral icterus  Neck:  No JVD  Chest: CTABL  CVS: Regular rhythm. Normal S1/S2.  Abdomen: nondistended, normoactive BS, soft and non-tender.  MSK: No obvious deformity or joint swelling  Skin: Warm and dry  Neuro: AAOx3, no focal neurological deficit  Psych: Cooperative    Labs     Recent Labs     06/15/23  1346 06/17/23  1600   WBC 5.03 5.61   RBC 5.37* 4.96   HGB 13.9 12.6   HCT 41.3 38.8   MCV 76.9* 78.2*   MCH 25.9* 25.4*   MCHC 33.7 32.5*   RDW 17.1*  17.8*   * 520*     No results for input(s): PROTIME, INR, PTT, D-DIMER, FERRITIN, IRON, TRANS, TIBC, LABIRON, [id number], FOLATE, LDH, HAPTOGLOBIN, RETICCNTAUTO, RETABS, PERIPSMEAREV in the last 72 hours.   Recent Labs     06/15/23  1346 06/17/23  1600    139   K 3.3* 4.1   CHLORIDE 105 108*   CO2 27 22   BUN 7.0 10.7   CREATININE 0.72 0.92   EGFRNORACEVR >90 >60   GLUCOSE 101 111*   CALCIUM 9.4 9.5   ALBUMIN 4.8 3.7   GLOBULIN 3.9 3.1   ALKPHOS 375* 327*   * 264*   * 166*   BILITOT 3.4* 2.8*   BILIDIR 2.2* 1.7*   LIPASE 73 28     No results for input(s): LACTIC in the last 72 hours.  No results for input(s): CPK, TROPONINI in the last 72 hours.       Microbiology Results (last 7 days)       Procedure Component Value Units Date/Time    Urine culture [[id number]] Collected: 06/17/23 1809    Order Status: Sent Specimen: Urine Updated: 06/17/23 1850           Imaging     NM Hepatobiliary Scan (HIDA)    (Results Pending)     Assessment & Plan   Postoperative CBD dilation, r/o bile leak, vs hematoma vs obstruction  Hyperbilirubinemia and transaminitis, secondary to above  Hx Cholecystectomy with CLYDE drain placement/removal in April 2023  Essential HTN  Morbid Obesity    PLAN:   - NPO, HIDA scan  if inconclusive will need MRCP/ERCP  - GI and Surgery consult pending  - BCX2. Hold off on abx at this time as there are no signs of sepsis and pt w/o abdominal pain.   - IVFs, PRN antiemetics, avoid analgesics 8 hrs prior to HIDA  - Home antihypertensives resumed. PRN antihypertensives  - AM labs  - VTE Prophylaxis: Dillan CUENCA, Mery Peralta, FNP-C have discussed this patients case with Dr. Grossman who agrees with the diagnosis and treatment plan.

## 2023-06-19 LAB — BACTERIA UR CULT: NORMAL

## 2023-07-17 PROBLEM — N39.0 UTI (URINARY TRACT INFECTION): Status: RESOLVED | Noted: 2023-04-14 | Resolved: 2023-07-17

## 2023-10-26 ENCOUNTER — OFFICE VISIT (OUTPATIENT)
Dept: OBSTETRICS AND GYNECOLOGY | Facility: CLINIC | Age: 28
End: 2023-10-26
Payer: MEDICAID

## 2023-10-26 VITALS
TEMPERATURE: 98 F | SYSTOLIC BLOOD PRESSURE: 120 MMHG | WEIGHT: 234 LBS | HEIGHT: 66 IN | BODY MASS INDEX: 37.61 KG/M2 | DIASTOLIC BLOOD PRESSURE: 84 MMHG

## 2023-10-26 DIAGNOSIS — Z01.419 WELL WOMAN EXAM: Primary | ICD-10-CM

## 2023-10-26 DIAGNOSIS — N92.6 IRREGULAR MENSTRUAL BLEEDING: ICD-10-CM

## 2023-10-26 LAB
B-HCG UR QL: NEGATIVE
CTP QC/QA: YES
FINAL PATHOLOGIC DIAGNOSIS: NORMAL

## 2023-10-26 PROCEDURE — 1159F PR MEDICATION LIST DOCUMENTED IN MEDICAL RECORD: ICD-10-PCS | Mod: CPTII,,, | Performed by: OBSTETRICS & GYNECOLOGY

## 2023-10-26 PROCEDURE — 3079F PR MOST RECENT DIASTOLIC BLOOD PRESSURE 80-89 MM HG: ICD-10-PCS | Mod: CPTII,,, | Performed by: OBSTETRICS & GYNECOLOGY

## 2023-10-26 PROCEDURE — 81025 URINE PREGNANCY TEST: CPT | Mod: ,,, | Performed by: OBSTETRICS & GYNECOLOGY

## 2023-10-26 PROCEDURE — 4010F PR ACE/ARB THEARPY RXD/TAKEN: ICD-10-PCS | Mod: CPTII,,, | Performed by: OBSTETRICS & GYNECOLOGY

## 2023-10-26 PROCEDURE — 4010F ACE/ARB THERAPY RXD/TAKEN: CPT | Mod: CPTII,,, | Performed by: OBSTETRICS & GYNECOLOGY

## 2023-10-26 PROCEDURE — 99395 PR PREVENTIVE VISIT,EST,18-39: ICD-10-PCS | Mod: ,,, | Performed by: OBSTETRICS & GYNECOLOGY

## 2023-10-26 PROCEDURE — 99395 PREV VISIT EST AGE 18-39: CPT | Mod: ,,, | Performed by: OBSTETRICS & GYNECOLOGY

## 2023-10-26 PROCEDURE — 3074F PR MOST RECENT SYSTOLIC BLOOD PRESSURE < 130 MM HG: ICD-10-PCS | Mod: CPTII,,, | Performed by: OBSTETRICS & GYNECOLOGY

## 2023-10-26 PROCEDURE — 81025 POCT URINE PREGNANCY: ICD-10-PCS | Mod: ,,, | Performed by: OBSTETRICS & GYNECOLOGY

## 2023-10-26 PROCEDURE — 3008F PR BODY MASS INDEX (BMI) DOCUMENTED: ICD-10-PCS | Mod: CPTII,,, | Performed by: OBSTETRICS & GYNECOLOGY

## 2023-10-26 PROCEDURE — 3074F SYST BP LT 130 MM HG: CPT | Mod: CPTII,,, | Performed by: OBSTETRICS & GYNECOLOGY

## 2023-10-26 PROCEDURE — 3079F DIAST BP 80-89 MM HG: CPT | Mod: CPTII,,, | Performed by: OBSTETRICS & GYNECOLOGY

## 2023-10-26 PROCEDURE — 3008F BODY MASS INDEX DOCD: CPT | Mod: CPTII,,, | Performed by: OBSTETRICS & GYNECOLOGY

## 2023-10-26 PROCEDURE — 1159F MED LIST DOCD IN RCRD: CPT | Mod: CPTII,,, | Performed by: OBSTETRICS & GYNECOLOGY

## 2023-10-26 NOTE — PROGRESS NOTES
Chief Complaint:  Follow-up     History of Present Illness:  Tony Ruggiero is a 28 y.o. year old  presents for her well woman exam. Currently using mirena for birth control, inserted 2022. C/o painless daily spotting with mirena.       Review of Systems:  General/Constitutional: Chills denies. Fatigue/weakness denies. Fever denies. Night sweats denies. Hot flashes denies    Respiratory: Cough denies. Hemoptysis denies. SOB denies. Sputum production denies. Wheezing denies .   Cardiovascular: Chest pain denies. Dizziness denies. Palpitations denies. Swelling in hands/feet denies.                Breast: Dimpling denies. Breast mass denies. Breast pain/tenderness denies. Nipple discharge denies. Puckering denies.  Gastrointestinal: Abdominal pain denies. Blood in stool denies. Constipation denies. Diarrhea denies. Heartburn denies. Nausea denies. Vomiting denies    Genitourinary: Incontinence denies. Blood in urine denies. Frequent urination denies. Painful urination denies. Urinary urgency denies. Nocturia denies    Gynecologic: Irregular menses +. Heavy bleeding denies. Painful menses denies. Vaginal discharge denies. Vaginal odor denies. Vaginal itching denies. Vaginal lesion denies. Pelvic pain denies. Decreased libido denies. Vulvar lesion denies. Prolapse of genital organs denies. Painful intercourse denies. Postcoital bleeding denies    Psychiatric: Depression denies. Anxiety denies.     OB History    Para Term  AB Living   3 3 3 0 0 3   SAB IAB Ectopic Multiple Live Births   0 0 0 0 3      # 1 - Date: 12/10/15, Sex: Female, Weight: 2.92 kg (6 lb 7 oz), GA: 37w6d, Delivery: Vaginal, Spontaneous, Apgar1: None, Apgar5: None, Living: Living, Birth Comments: None    # 2 - Date: 20, Sex: None, Weight: 2.778 kg (6 lb 2 oz), GA: 37w6d, Delivery: Vaginal, Spontaneous, Apgar1: None, Apgar5: None, Living: Living, Birth Comments: None    # 3 - Date: 05/10/22, Sex: Male, Weight: 3.062 kg (6 lb  12 oz), GA: 37w5d, Delivery: Vaginal, Spontaneous, Apgar1: None, Apgar5: None, Living: Living, Birth Comments: None      Past Medical History:   Diagnosis Date    Hypertension        Current Outpatient Medications:     amLODIPine (NORVASC) 5 MG tablet, Take 1 tablet (5 mg total) by mouth once daily., Disp: 30 tablet, Rfl: 0    ciprofloxacin HCl (CIPRO) 500 MG tablet, Take 1 tablet (500 mg total) by mouth every 12 (twelve) hours., Disp: 10 tablet, Rfl: 0    hydroCHLOROthiazide (HYDRODIURIL) 12.5 MG Tab, Take 1 tablet (12.5 mg total) by mouth once daily., Disp: 30 tablet, Rfl: 0    lisinopriL (PRINIVIL,ZESTRIL) 40 MG tablet, Take 1 tablet (40 mg total) by mouth once daily., Disp: 90 tablet, Rfl: 0    metoprolol succinate (TOPROL-XL) 200 MG 24 hr tablet, Take 1 tablet (200 mg total) by mouth 2 (two) times daily., Disp: 60 tablet, Rfl: 0    Review of patient's allergies indicates:  No Known Allergies    Past Surgical History:   Procedure Laterality Date    CHOLECYSTECTOMY      LAPAROSCOPIC CHOLECYSTECTOMY N/A 04/13/2023    Procedure: CHOLECYSTECTOMY, LAPAROSCOPIC;  Surgeon: Екатерина Krishna MD;  Location: [facility];  Service: General;  Laterality: N/A;     History reviewed. No pertinent family history.  Social History     Socioeconomic History    Marital status: Single   Tobacco Use    Smoking status: Never    Smokeless tobacco: Never   Substance and Sexual Activity    Alcohol use: Never    Drug use: Never    Sexual activity: Yes   Social History Narrative    ** Merged History Encounter **          Social Determinants of Health     Financial Resource Strain: Low Risk  (4/13/2023)    Overall Financial Resource Strain (CARDIA)     Difficulty of Paying Living Expenses: Not hard at all   Food Insecurity: No Food Insecurity (4/13/2023)    Hunger Vital Sign     Worried About Running Out of Food in the Last Year: Never true     Ran Out of Food in the Last Year: Never true   Transportation Needs: No Transportation Needs  "(4/13/2023)    PRAPARE - Transportation     Lack of Transportation (Medical): No     Lack of Transportation (Non-Medical): No   Physical Activity: Inactive (4/13/2023)    Exercise Vital Sign     Days of Exercise per Week: 0 days     Minutes of Exercise per Session: 0 min   Stress: No Stress Concern Present (4/13/2023)    [employer] [place] - Occupational Stress Questionnaire     Feeling of Stress : Not at all   Social Connections: Socially Isolated (4/13/2023)    Social Connection and Isolation Panel [NHANES]     Frequency of Communication with Friends and Family: More than three times a week     Frequency of Social Gatherings with Friends and Family: More than three times a week     Attends Christian Services: Never     Active Member of Clubs or Organizations: No     Attends Club or Organization Meetings: Never     Marital Status: Never    Housing Stability: Low Risk  (4/13/2023)    Housing Stability Vital Sign     Unable to Pay for Housing in the Last Year: No     Number of Places Lived in the Last Year: 1     Unstable Housing in the Last Year: No       Physical Exam:  /84   Temp 97.9 °F (36.6 °C)   Ht 5' 6" (1.676 m)   Wt 106.1 kg (234 lb)   BMI 37.77 kg/m²     Chaperone: present.     General appearance: healthy, well-nourished and well-developed     Psychiatric: Orientation to time, place and person. Normal mood and affect and active, alert     Skin: Appearance: no rashes or lesions.     Neck:   Neck: supple, FROM, trachea midline. and no masses   Thyroid: no enlargement or nodules and non-tender.       Cardiovascular:   Auscultation: RRR and no murmur.   Peripheral Vascular: no varicosities, LLE edema, RLE edema, calf tenderness, and palpable cord and pedal pulses intact.     Lungs:   Respiratory effort: no intercostal retractions or accessory muscle usage.   Auscultation: no wheezing, rales/crackles, or rhonchi and clear to auscultation.     Breast: "   Inspection/Palpation: no tenderness, discrete/distinct masses, skin changes, or abnormal secretions. Nipple appearance normal.     Abdomen:   Auscultation/Inspection/Palpation: no hepatomegaly, splenomegaly, masses, tenderness or CVA tenderness and soft, non-distended bowel sounds preset.    Hernia: no palpable hernias.     Female Genitalia:    Vulva: no masses, tenderness or lesions    Bladder/Urethra: no urethral discharge or mass, normal meatus, bladder non-distended.    Vagina: no tenderness, erythema, cystocele, rectocele, abnormal vaginal discharge or vesicle(s) or ulcers    Cervix: no discharge, no cervical lacerations noted or motion tenderness and grossly normal  STINGS in place   Uterus: normal size and shape and midline, non-tender, and no uterine prolapse.    Adnexa/Parametria: no parametrial tenderness or mass, no adnexal tenderness or ovarian mass.     Lymph Nodes:   Palpation: non tender submandibular nodes, axillary nodes, or inguinal nodes.     Rectal Exam:   Rectum: normal perianal skin.       Assessment/Plan:  1. Well woman exam  Pap gc/cz/tv  Advised patient if she notices any changes to her breasts, including a lump, mass, dimpling, discharge, rash or tenderness, to should contact us immediately   Healthy diet, exercise   Multivitamin   Seat belt   Sunscreen use   Safe sex/ STI education   Contraception evaluation: mirena, 8/2022   Gardasil evaluation: 2/3, educated on third gardasil    2. Irregular menstrual bleeding w/ IUD  Educated  Bleeding patterns with IUD  Cultures: gc/cz/tv/myco/urea  If results normal, will remove IUD    Underwent CT 6/15/23  - IUD well positioned in uterus    3. BMI 37.0-37.9, adult  Weight loss with diet and exercise modifications      RTC 2 weeks to review results if above results are normal desires removal, will perform on RTC

## 2023-10-31 LAB — PSYCHE PATHOLOGY RESULT: NORMAL

## 2023-12-06 NOTE — PROGRESS NOTES
Chief Complaint:  Mirena Removal (Mirena Inserted 2022. )    History of Present Illness:  Tony Ruggiero is a 28 y.o.  who presents today for IUD removal. Desires to discuss other options.      LMP: Irregular  Frequency: n/a  Cycle length: n/a  Flow: Light  Intermenstrual bleeding: Yes  Postcoital bleeding: No  Dysmenorrhea: No  Sexually active: yes   Dyspareunia: Yes  Contraception: Mirena, 2022  H/o STI: CZ  Last pap: 10/26/23 NIL, neg gc/cz/tv   H/o abnl pap: No   Colposcopy: N/A  Gardasil: 2/3  MMG: n/a  H/o abnl MMG:n/a   Colonoscopy: n/a      Review of Systems:  General/Constitutional: Chills denies. Fatigue/weakness denies. Fever denies. Night sweats denies. Hot flashes denies.  Gynecologic: Irregular menses denies. Heavy bleeding denies. Painful menses denies. Vaginal discharge denies. Vaginal odor denies. Vaginal itching/Irritation denies. Vaginal lesion denies.  Pelvic pain denies. Decreased libido denies. Vulvar lesion denies. Prolapse of genital organs denies. Painful intercourse denies. Postcoital bleeding denies.   Psychiatric: Mood lability denies. Depressed mood denies. Suicidal thoughts denies. Anxiety denies. Overwhelmed denies. Appetite normal. Energy level normal.     OB History    Para Term  AB Living   3 3 3 0 0 3   SAB IAB Ectopic Multiple Live Births   0 0 0 0 3      # 1 - Date: 12/10/15, Sex: Female, Weight: 2.92 kg (6 lb 7 oz), GA: 37w6d, Delivery: Vaginal, Spontaneous, Apgar1: None, Apgar5: None, Living: Living, Birth Comments: None    # 2 - Date: 20, Sex: None, Weight: 2.778 kg (6 lb 2 oz), GA: 37w6d, Delivery: Vaginal, Spontaneous, Apgar1: None, Apgar5: None, Living: Living, Birth Comments: None    # 3 - Date: 05/10/22, Sex: Male, Weight: 3.062 kg (6 lb 12 oz), GA: 37w5d, Delivery: Vaginal, Spontaneous, Apgar1: None, Apgar5: None, Living: Living, Birth Comments: None      Past Medical History:   Diagnosis Date    Hypertension          Current Outpatient  Medications:     lisinopriL (PRINIVIL,ZESTRIL) 40 MG tablet, Take 1 tablet (40 mg total) by mouth once daily., Disp: 90 tablet, Rfl: 0    metoprolol succinate (TOPROL-XL) 200 MG 24 hr tablet, Take 1 tablet (200 mg total) by mouth 2 (two) times daily., Disp: 60 tablet, Rfl: 0    amLODIPine (NORVASC) 5 MG tablet, Take 1 tablet (5 mg total) by mouth once daily. (Patient not taking: Reported on 12/7/2023), Disp: 30 tablet, Rfl: 0    ciprofloxacin HCl (CIPRO) 500 MG tablet, Take 1 tablet (500 mg total) by mouth every 12 (twelve) hours. (Patient not taking: Reported on 12/7/2023), Disp: 10 tablet, Rfl: 0    hydroCHLOROthiazide (HYDRODIURIL) 12.5 MG Tab, Take 1 tablet (12.5 mg total) by mouth once daily. (Patient not taking: Reported on 12/7/2023), Disp: 30 tablet, Rfl: 0    norethindrone (MICRONOR) 0.35 mg tablet, Take 1 tablet (0.35 mg total) by mouth once daily., Disp: 28 tablet, Rfl: 3    Review of patient's allergies indicates:  No Known Allergies    Social History     Socioeconomic History    Marital status: Single   Tobacco Use    Smoking status: Never    Smokeless tobacco: Never   Substance and Sexual Activity    Alcohol use: Never    Drug use: Never    Sexual activity: Not Currently     Birth control/protection: I.U.D.   Social History Narrative    ** Merged History Encounter **          Social Determinants of Health     Financial Resource Strain: Low Risk  (4/13/2023)    Overall Financial Resource Strain (CARDIA)     Difficulty of Paying Living Expenses: Not hard at all   Food Insecurity: No Food Insecurity (4/13/2023)    Hunger Vital Sign     Worried About Running Out of Food in the Last Year: Never true     Ran Out of Food in the Last Year: Never true   Transportation Needs: No Transportation Needs (4/13/2023)    PRAPARE - Transportation     Lack of Transportation (Medical): No     Lack of Transportation (Non-Medical): No   Physical Activity: Inactive (4/13/2023)    Exercise Vital Sign     Days of Exercise per  "Week: 0 days     Minutes of Exercise per Session: 0 min   Stress: No Stress Concern Present (4/13/2023)    [employer] - Occupational Stress Questionnaire     Feeling of Stress : Not at all   Social Connections: Socially Isolated (4/13/2023)    Social Connection and Isolation Panel [NHANES]     Frequency of Communication with Friends and Family: More than three times a week     Frequency of Social Gatherings with Friends and Family: More than three times a week     Attends Mu-ism Services: Never     Active Member of Clubs or Organizations: No     Attends Club or Organization Meetings: Never     Marital Status: Never    Housing Stability: Low Risk  (4/13/2023)    Housing Stability Vital Sign     Unable to Pay for Housing in the Last Year: No     Number of Places Lived in the Last Year: 1     Unstable Housing in the Last Year: No       Physical Exam:  BP (!) 146/100   Temp 97.7 °F (36.5 °C)   Ht 5' 6" (1.676 m)   Wt 106.1 kg (234 lb)   Breastfeeding No   BMI 37.77 kg/m²     Chaperone present.    Constitutional: General appearance: healthy, well-nourished and well-developed  Psychiatric: Orientation to time, place and person. Normal mood and affect and active, alert  Abdomen: Auscultation/Inspection/Palpation: No tenderness or masses. Soft, nondistended   Female Genitalia:    Vulva: no masses, atrophy or lesions    Bladder/Urethra: no urethral discharge or mass, normal meatus, bladder non-distended.    Vagina: no tenderness, erythema, cystocele, rectocele, abnormal vaginal discharge, or vesicle(s) or ulcers     Cervix: no discharge or cervical motion tenderness and grossly normal; +IUD strings noted    Uterus: normal size and shape and midline, non-tender, and no uterine prolapse.    Adnexa/Parametria: no parametrial tenderness or mass, no adnexal tenderness or ovarian mass.    IUD Removal:  Pelvic examination was normal. Pap smear is current. Urine pregnancy test " negative.  With the patient in the dorsal lithotomy position, a speculum was placed into vagina.  The strings were easily grasped with: Ring Forceps.  The Mirena IUD was removed and shown to the patient.  Patient tolerated procedure well.      Assessment/Plan:  1. Encounter for contraceptive management, unspecified type  Patient tolerated well.     We discussed possible cramping and bleeding over the next few days.     Patient informed that return to fertility is immediate.     Desires to discuss other contraceptive options  Patient encouraged to take PNV daily.     Discussed with patient not a candidate for Estrogen containing contraceptive management       Explained common options for contraception including natural family planning, barrier methods, depo-provera, POP, IUD, Nexplanon, and sterilization.        Discussed that pills should be taken at the same time every day to minimize breakthrough bleeding and to expect breakthrough bleeding for the first 3 packs and then it should resolve. If BTB continues after 3 months, a change may be necessary. A second birth control method should be used for the first 7 days in the pill pack.        Advised patient that smoking is harmful due to increased risks of stroke, heart attack and blood clots when taking pills. Patient to contact us immediately if she experiences severe abdominal pain, severe chest pain, severe headaches, eye-visual changes, severe leg pain or SOB.       Discussed that birth control, such as pills, patch, Nuva Ring or Depo Provera, Nexplanon, or IUDs do not protect against STDs.        Patient desires pills  Rx: Micronor  Follow up 3 months    2. Hypertension, unspecified type  Educated  Advise to follow up with PCP

## 2023-12-07 ENCOUNTER — PROCEDURE VISIT (OUTPATIENT)
Dept: OBSTETRICS AND GYNECOLOGY | Facility: CLINIC | Age: 28
End: 2023-12-07
Payer: MEDICAID

## 2023-12-07 VITALS
DIASTOLIC BLOOD PRESSURE: 100 MMHG | WEIGHT: 234 LBS | HEIGHT: 66 IN | SYSTOLIC BLOOD PRESSURE: 146 MMHG | TEMPERATURE: 98 F | BODY MASS INDEX: 37.61 KG/M2

## 2023-12-07 DIAGNOSIS — Z30.9 ENCOUNTER FOR CONTRACEPTIVE MANAGEMENT, UNSPECIFIED TYPE: Primary | ICD-10-CM

## 2023-12-07 DIAGNOSIS — I10 HYPERTENSION, UNSPECIFIED TYPE: ICD-10-CM

## 2023-12-07 PROCEDURE — 58301 PR REMOVE, INTRAUTERINE DEVICE: ICD-10-PCS | Mod: ,,, | Performed by: OBSTETRICS & GYNECOLOGY

## 2023-12-07 PROCEDURE — 58301 REMOVE INTRAUTERINE DEVICE: CPT | Mod: ,,, | Performed by: OBSTETRICS & GYNECOLOGY

## 2023-12-07 RX ORDER — ACETAMINOPHEN AND CODEINE PHOSPHATE 120; 12 MG/5ML; MG/5ML
1 SOLUTION ORAL DAILY
Qty: 28 TABLET | Refills: 3 | Status: SHIPPED | OUTPATIENT
Start: 2023-12-07 | End: 2024-12-06

## 2024-05-15 NOTE — PROGRESS NOTES
Chief Complaint:   c/o missed cycle     History of Present Illness:  Tony Ruggiero is a 28 y.o. year old  presents c/o missed cycle. LMP 3/3/24. Prior to this, having regular monthly cycles. Sexually active. Denies vaginal bleeding, discharge or pelvic pain.       LMP: 3/3/24  Frequency: monthly  Cycle length: 7 days  Flow: normal  Intermenstrual bleeding: no  Postcoital bleeding: No  Dysmenorrhea: yes mild  Sexually active: yes  Dyspareunia: Yes  Contraception: pregnant  H/o STI: CZ  Last pap: 10/26/23 NIL, neg gc/cz/tv   H/o abnl pap: No  Colposcopy: n/a  Gardasil: 2/3  MMG: n/a  H/o abnl MMG:n/a      Review of Systems:  General/Constitutional: Chills denies. Fatigue/weakness denies. Fever denies. Night sweats denies. Hot flashes denies.  Gastrointestinal: Abdominal pain denies. Blood in stool denies. Constipation denies. Diarrhea denies. Heartburn denies. Nausea denies. Vomiting denies.   Genitourinary: Incontinence denies. Blood in urine denies. Frequent urination denies. Urgency denies. Painful urination denies. Nocturia denies.  Gynecologic: Irregular menses denies. Heavy bleeding denies. Painful menses denies. Vaginal discharge denies. Vaginal odor denies. Vaginal itching/Irritation denies. Vaginal lesion denies.  Pelvic pain denies. Decreased libido denies. Vulvar lesion denies. Prolapse of genital organs denies. Painful intercourse denies. Postcoital bleeding denies.   Psychiatric: Mood lability denies. Depressed mood denies. Suicidal thoughts denies. Anxiety denies. Overwhelmed denies. Appetite normal. Energy level normal.    OB History    Para Term  AB Living   3 3 3 0 0 3   SAB IAB Ectopic Multiple Live Births   0 0 0 0 3      # 1 - Date: 12/10/15, Sex: Female, Weight: 2.92 kg (6 lb 7 oz), GA: 37w6d, Type: Vaginal, Spontaneous, Apgar1: None, Apgar5: None, Living: Living, Birth Comments: None    # 2 - Date: 20, Sex: None, Weight: 2.778 kg (6 lb 2 oz), GA: 37w6d, Type: Vaginal,  Spontaneous, Apgar1: None, Apgar5: None, Living: Living, Birth Comments: None    # 3 - Date: 05/10/22, Sex: Male, Weight: 3.062 kg (6 lb 12 oz), GA: 37w5d, Type: Vaginal, Spontaneous, Apgar1: None, Apgar5: None, Living: Living, Birth Comments: None      Past Medical History:   Diagnosis Date    Hypertension        Past Surgical History:   Procedure Laterality Date    CHOLECYSTECTOMY      LAPAROSCOPIC CHOLECYSTECTOMY N/A 04/13/2023    Procedure: CHOLECYSTECTOMY, LAPAROSCOPIC;  Surgeon: Екатерина Krishna MD;  Location: [facility];  Service: General;  Laterality: N/A;        No current outpatient medications on file.    Social History     Socioeconomic History    Marital status: Single   Tobacco Use    Smoking status: Never    Smokeless tobacco: Never   Substance and Sexual Activity    Alcohol use: Never    Drug use: Never    Sexual activity: Yes     Partners: Male   Social History Narrative    ** Merged History Encounter **          Social Determinants of Health     Financial Resource Strain: Low Risk  (4/13/2023)    Overall Financial Resource Strain (CARDIA)     Difficulty of Paying Living Expenses: Not hard at all   Food Insecurity: No Food Insecurity (4/13/2023)    Hunger Vital Sign     Worried About Running Out of Food in the Last Year: Never true     Ran Out of Food in the Last Year: Never true   Transportation Needs: No Transportation Needs (4/13/2023)    PRAPARE - Transportation     Lack of Transportation (Medical): No     Lack of Transportation (Non-Medical): No   Physical Activity: Inactive (4/13/2023)    Exercise Vital Sign     Days of Exercise per Week: 0 days     Minutes of Exercise per Session: 0 min   Stress: No Stress Concern Present (4/13/2023)    [employer] [place] [employer] - Occupational Stress Questionnaire     Feeling of Stress : Not at all   Housing Stability: Low Risk  (4/13/2023)    Housing Stability Vital Sign     Unable to Pay for Housing in the Last Year: No     Number of Places Lived  "in the Last Year: 1     Unstable Housing in the Last Year: No         Physical Exam:  /82 (BP Location: Right arm, Patient Position: Sitting, BP Method: Medium (Manual))   Temp 96.6 °F (35.9 °C) (Temporal)   Ht 5' 6" (1.676 m)   Wt 104.1 kg (229 lb 6.4 oz)   LMP 03/03/2024   BMI 37.03 kg/m²     Chaperone present.       Constitutional: General appearance: healthy, well-nourished and well-developed  Psychiatric:  Orientation to time, place and person. Normal mood and affect and active, alert   Abdomen: Auscultation/Inspection/Palpation: No tenderness or masses. Soft, nondistended      Female Genitalia:      Vulva: no masses, atrophy or lesions      Bladder/Urethra: no urethral discharge or mass, normal meatus, bladder non-distended.      Vagina: no tenderness, erythema, cystocele, rectocele, abnormal vaginal discharge, or vesicle(s) or ulcers                   Cervix: no discharge or cervical motion tenderness and grossly normal      Uterus: normal size and shape and midline, non-tender, and no uterine prolapse.      Adnexa/Parametria: no parametrial tenderness or mass, no adnexal tenderness or ovarian mass.       Assessment/Plan:  1. Oligomenorrhea  UPT+   Educated   No LITO   PNV, folic acid   Pain/fever/bleeding prec     Based on LMP, GA today 10w4d with MIAH of 12/8/2024  Follow up new ob visit in 1 week    2. Chronic hypertension  Educated  States previously on multiple medications    PREVIOUS  Norvasc 5 mg  HCTZ 12.5 mg  Lisinopril 40 mg  Metoprolol  mg    Pt states self discontinued all due to not helping  No medication today  BP today 136/82  Will continue to monitor closely  Educated on compliance if medication needed         This note was transcribed by Gladys Ford MA. There may be transcription errors as a result, however minimal. Effort has been made to ensure accuracy of transcription, but any obvious errors or omissions should be clarified with the author of the document.       "

## 2024-05-16 ENCOUNTER — OFFICE VISIT (OUTPATIENT)
Dept: OBSTETRICS AND GYNECOLOGY | Facility: CLINIC | Age: 29
End: 2024-05-16
Payer: MEDICAID

## 2024-05-16 VITALS
TEMPERATURE: 97 F | SYSTOLIC BLOOD PRESSURE: 136 MMHG | BODY MASS INDEX: 36.86 KG/M2 | HEIGHT: 66 IN | DIASTOLIC BLOOD PRESSURE: 82 MMHG | WEIGHT: 229.38 LBS

## 2024-05-16 DIAGNOSIS — Z11.3 SCREENING EXAMINATION FOR VENEREAL DISEASE: ICD-10-CM

## 2024-05-16 DIAGNOSIS — I10 CHRONIC HYPERTENSION: ICD-10-CM

## 2024-05-16 DIAGNOSIS — N91.5 OLIGOMENORRHEA, UNSPECIFIED TYPE: Primary | ICD-10-CM

## 2024-05-16 LAB
B-HCG UR QL: POSITIVE
C TRACH RRNA SPEC QL NAA+PROBE: NEGATIVE
CTP QC/QA: YES
N GONORRHOEA RRNA SPEC QL NAA+PROBE: NEGATIVE

## 2024-05-16 PROCEDURE — 3075F SYST BP GE 130 - 139MM HG: CPT | Mod: CPTII,,, | Performed by: OBSTETRICS & GYNECOLOGY

## 2024-05-16 PROCEDURE — 87661 TRICHOMONAS VAGINALIS AMPLIF: CPT | Performed by: OBSTETRICS & GYNECOLOGY

## 2024-05-16 PROCEDURE — 1159F MED LIST DOCD IN RCRD: CPT | Mod: CPTII,,, | Performed by: OBSTETRICS & GYNECOLOGY

## 2024-05-16 PROCEDURE — 3008F BODY MASS INDEX DOCD: CPT | Mod: CPTII,,, | Performed by: OBSTETRICS & GYNECOLOGY

## 2024-05-16 PROCEDURE — 3079F DIAST BP 80-89 MM HG: CPT | Mod: CPTII,,, | Performed by: OBSTETRICS & GYNECOLOGY

## 2024-05-16 PROCEDURE — 87591 N.GONORRHOEAE DNA AMP PROB: CPT | Performed by: OBSTETRICS & GYNECOLOGY

## 2024-05-16 PROCEDURE — 81025 URINE PREGNANCY TEST: CPT | Mod: ,,, | Performed by: OBSTETRICS & GYNECOLOGY

## 2024-05-16 PROCEDURE — 99214 OFFICE O/P EST MOD 30 MIN: CPT | Mod: ,,, | Performed by: OBSTETRICS & GYNECOLOGY

## 2024-05-17 LAB
C TRACH RRNA SPEC QL NAA+PROBE: NEGATIVE
N GONORRHOEA RRNA SPEC QL NAA+PROBE: NEGATIVE
SPECIMEN SOURCE: NORMAL
T VAGINALIS RRNA SPEC QL NAA+PROBE: NEGATIVE

## 2024-05-20 NOTE — PROGRESS NOTES
Chief Complaint:  Initial Prenatal Visit    History of Present Illness:  Tony Ruggiero is a 28 y.o. year old  presents for her new ob at 11w2d by LMP. She is doing well.       LMP: 3/3/24  Frequency: monthly  Cycle length: 7 days  Flow: normal  Intermenstrual bleeding: no  Postcoital bleeding: No  Dysmenorrhea: yes, mild  Sexually active: yes  Dyspareunia: Yes  Contraception: pregnant  H/o STI: CZ  Last pap: 10/26/23 NIL, neg gc/cz/tv  H/o abnl pap: No  Colposcopy: n/a  Gardasil: 2/3  MMG: n/a  H/o abnl MMG:n/a        Review of Systems:  General/Constitutional: Chills denies. Fatigue/weakness denies. Fever denies. Night sweats denies. Hot flashes denies.   Respiratory: Cough denies. Hemoptysis denies. SOB denies. Sputum production denies. Wheezing denies.   Cardiovascular: Chest pain denies. Dizziness denies. Palpitations denies. Swelling in hands/feet denies.    Gastrointestinal: Abdominal pain denies. Blood in stool denies. Constipation denies. Diarrhea denies. Heartburn denies. Nausea denies. Vomiting denies.   Genitourinary: Incontinence denies. Blood in urine denies. Frequent urination denies. Painful urination denies.  Urinary urgency denies.  Nocturia denies.   Gynecologic: Irregular menses denies. Heavy bleeding denies. Painful menses denies. Vaginal discharge denies. Vaginal odor denies. Vaginal itching denies. Vaginal lesion denies.  Pelvic pain denies. Decreased libido denies. Vulvar lesion denies. Prolapse of genital organs denies. Painful intercourse denies. Postcoital bleeding denies.   Psychiatric: Depression denies. Anxiety denies.     OB History    Para Term  AB Living   4 3 3 0 0 3   SAB IAB Ectopic Multiple Live Births   0 0 0 0 3      # 1 - Date: 12/10/15, Sex: Female, Weight: 2.92 kg (6 lb 7 oz), GA: 37w6d, Type: Vaginal, Spontaneous, Apgar1: None, Apgar5: None, Living: Living, Birth Comments: None    # 2 - Date: 20, Sex: Female, Weight: 2.778 kg (6 lb 2 oz), GA:  37w6d, Type: Vaginal, Spontaneous, Apgar1: None, Apgar5: None, Living: Living, Birth Comments: None    # 3 - Date: 05/10/22, Sex: Male, Weight: 3.062 kg (6 lb 12 oz), GA: 37w5d, Type: Vaginal, Spontaneous, Apgar1: None, Apgar5: None, Living: Living, Birth Comments: None    # 4 - Date: None, Sex: None, Weight: None, GA: None, Type: None, Apgar1: None, Apgar5: None, Living: None, Birth Comments: None        Past Medical History:   Diagnosis Date    Gestational hypertension        Current Outpatient Medications:     PNV no.153/FA/om3/dha/epa/fish (PRENATAL GUMMIES ORAL), Take 2 Units by mouth Daily., Disp: , Rfl:     Review of patient's allergies indicates:  No Known Allergies    Past Surgical History:   Procedure Laterality Date    LAPAROSCOPIC CHOLECYSTECTOMY N/A 04/13/2023    Procedure: CHOLECYSTECTOMY, LAPAROSCOPIC;  Surgeon: Екатерина Krishna MD;  Location: [facility];  Service: General;  Laterality: N/A;     Family History   Problem Relation Name Age of Onset    Breast cancer Neg Hx      Ovarian cancer Neg Hx      Colon cancer Neg Hx      Uterine cancer Neg Hx      Cervical cancer Neg Hx       Social History     Socioeconomic History    Marital status: Single   Tobacco Use    Smoking status: Never     Passive exposure: Never    Smokeless tobacco: Never   Substance and Sexual Activity    Alcohol use: Never    Drug use: Never    Sexual activity: Yes     Partners: Male     Birth control/protection: None     Comment: STI: Chlamydia   Social History Narrative    ** Merged History Encounter **          Social Determinants of Health     Financial Resource Strain: Low Risk  (4/13/2023)    Overall Financial Resource Strain (CARDIA)     Difficulty of Paying Living Expenses: Not hard at all   Food Insecurity: No Food Insecurity (4/13/2023)    Hunger Vital Sign     Worried About Running Out of Food in the Last Year: Never true     Ran Out of Food in the Last Year: Never true   Transportation Needs: No Transportation Needs  "(2023)    PRAPARE - Transportation     Lack of Transportation (Medical): No     Lack of Transportation (Non-Medical): No   Physical Activity: Inactive (2023)    Exercise Vital Sign     Days of Exercise per Week: 0 days     Minutes of Exercise per Session: 0 min   Stress: No Stress Concern Present (2023)    [employer] - Occupational Stress Questionnaire     Feeling of Stress : Not at all   Housing Stability: Low Risk  (2023)    Housing Stability Vital Sign     Unable to Pay for Housing in the Last Year: No     Number of Places Lived in the Last Year: 1     Unstable Housing in the Last Year: No       Physical Exam:  /78 (BP Location: Left arm, Patient Position: Sitting, BP Method: Large (Manual))   Temp 97.2 °F (36.2 °C) (Temporal)   Ht 5' 5" (1.651 m)   Wt 104.3 kg (230 lb)   LMP 2024   BMI 38.27 kg/m²     Chaperone: present.       Constitutional: General appearance: healthy, well-nourished and well-developed   Psychiatric:  Orientation to time, place and person. Normal mood and affect and active, alert   Abdomen: Auscultation/Inspection/Palpation: No tenderness or masses. Soft, nondistended         Assessment/Plan:  1. Chronic hypertension  Pre-existing hypertension in pregnancy is associated with adverse fetal outcomes, including growth restriction, stillbirth,  delivery, and pregnancy risk for mom and baby to include abruption and superimposed preeclampsia.     Growth scans starting at 28 weeks and then every 3 to 4 weeks     Weekly testing starting at 32 to 34 weeks     Baseline EKG, CMP, 24hr urine    2. Obesity in pregnancy, antepartum  Patient educated on risks and complications of obesity and pregnancy including but not limited to first trimester miscarriage recurrent miscarriages, congenital anomalies, HTN disorders, gestational diabetes, macrosomia, PTL&D, higher risk of fetal demise in-utero and higher risk of CS (and wound " complication including infection breakdown) with obesity.    The [place] preventative task force placed the following recommendations in 2016 which ACOG supports regarding utilization of low dose 81mg ASA starting at 14 weeks and continuing through pregnancy in high risk pregnant women. The utilization of this has been shown to reduce the risk for preeclampsia by 24% in clinical trials and reduce the risk of  birth by 14% and IUGR about 20%.     Begin 81mg ASA @12wks  Baseline mirna    3. 11 weeks gestation of pregnancy  We discussed diet/supplements and modifiable risk factors.     Patients advised to continue moderate physical activity.       Patients will report unusual headaches, visual disturbances, pelvic pain or cramping, vaginal bleeding, rupture of membranes, extreme swelling in hands or face, diminished urine volume, any prolonged illness or infection, or persistent symptoms of labor.     Discussed with patient on the uncertainty of COVID 19 in relation to pregnancy complications with patient and unborn fetus.  Advised to CDC guidelines of social distancing, patient to stay home as much as possible, and limit contact with others.  Travel restrictions discussed.  Patient to call office if she has a temperature over 100.4 cough shortness of breath or GI symptoms. Patients educated if she feels she is in an emergency to call 911.      Educated patient it is strongly advised by American Congress of Obstetrics and Gynecology as well as Society for Maternal Fetal [employer] Specialists to receive the COVID 19 vaccine. Educated patient that pregnant patients are at high risk of severe COVID 19 infections, including but not limited to death.     +FHT   GA: 11w2d by LMP; 11w6d by US  MIAH: 24 by LMP  PNL, PNV, folic acid  Desires Marcella  RTC 4 weeks      This note was transcribed by Gladys Ford MA. There may be transcription errors as a result, however minimal. Effort has been made to ensure accuracy of  transcription, but any obvious errors or omissions should be clarified with the author of the document.

## 2024-05-21 ENCOUNTER — LAB VISIT (OUTPATIENT)
Dept: LAB | Facility: HOSPITAL | Age: 29
End: 2024-05-21
Attending: OBSTETRICS & GYNECOLOGY
Payer: MEDICAID

## 2024-05-21 ENCOUNTER — INITIAL PRENATAL (OUTPATIENT)
Dept: OBSTETRICS AND GYNECOLOGY | Facility: CLINIC | Age: 29
End: 2024-05-21
Payer: MEDICAID

## 2024-05-21 VITALS
BODY MASS INDEX: 38.32 KG/M2 | HEIGHT: 65 IN | WEIGHT: 230 LBS | DIASTOLIC BLOOD PRESSURE: 78 MMHG | TEMPERATURE: 97 F | SYSTOLIC BLOOD PRESSURE: 112 MMHG

## 2024-05-21 DIAGNOSIS — I10 CHRONIC HYPERTENSION: ICD-10-CM

## 2024-05-21 DIAGNOSIS — Z3A.11 11 WEEKS GESTATION OF PREGNANCY: ICD-10-CM

## 2024-05-21 DIAGNOSIS — O99.210 OBESITY IN PREGNANCY, ANTEPARTUM: ICD-10-CM

## 2024-05-21 LAB
ABO AND RH: NORMAL
ALBUMIN SERPL-MCNC: 4.1 G/DL (ref 3.4–5)
ALBUMIN/GLOB SERPL: 1.3 RATIO
ALP SERPL-CCNC: 80 UNIT/L (ref 50–144)
ALT SERPL-CCNC: 32 UNIT/L (ref 1–45)
AMPHET UR QL SCN: NEGATIVE
ANION GAP SERPL CALC-SCNC: 13 MEQ/L (ref 2–13)
ANTIBODY SCREEN: NORMAL
AST SERPL-CCNC: 33 UNIT/L (ref 14–36)
BARBITURATE SCN PRESENT UR: NEGATIVE
BENZODIAZ UR QL SCN: NEGATIVE
BILIRUB SERPL-MCNC: 0.6 MG/DL (ref 0–1)
BILIRUB UR QL STRIP: POSITIVE
BUN SERPL-MCNC: 6 MG/DL (ref 7–20)
CALCIUM SERPL-MCNC: 9.7 MG/DL (ref 8.4–10.2)
CANNABINOIDS UR QL SCN: NEGATIVE
CHLORIDE SERPL-SCNC: 102 MMOL/L (ref 98–110)
CO2 SERPL-SCNC: 21 MMOL/L (ref 21–32)
COCAINE UR QL SCN: NEGATIVE
CREAT SERPL-MCNC: 0.64 MG/DL (ref 0.66–1.25)
CREAT/UREA NIT SERPL: 9 (ref 12–20)
ERYTHROCYTE [DISTWIDTH] IN BLOOD BY AUTOMATED COUNT: 14.1 % (ref 11–14.5)
GFR SERPLBLD CREATININE-BSD FMLA CKD-EPI: >90 ML/MIN/1.73/M2
GLOBULIN SER-MCNC: 3.2 GM/DL (ref 2–3.9)
GLUCOSE SERPL-MCNC: 85 MG/DL (ref 70–115)
GLUCOSE UR QL STRIP: NEGATIVE
HBV SURFACE AG SERPL QL IA: NEGATIVE
HBV SURFACE AG SERPL QL IA: NORMAL
HCT VFR BLD AUTO: 36.5 % (ref 36–48)
HCV AB SERPL QL IA: NONREACTIVE
HGB BLD-MCNC: 12.4 G/DL (ref 11.8–16)
HIV 1+2 AB+HIV1 P24 AG SERPL QL IA: NONREACTIVE
KETONES UR QL STRIP: POSITIVE
LEUKOCYTE ESTERASE UR QL STRIP: POSITIVE
MCH RBC QN AUTO: 26.4 PG (ref 27–34)
MCHC RBC AUTO-ENTMCNC: 34 G/DL (ref 31–37)
MCV RBC AUTO: 77.8 FL (ref 79–99)
METHADONE UR QL SCN: NEGATIVE
NRBC BLD AUTO-RTO: 0 %
OPIATES UR QL SCN: NEGATIVE
PCP UR QL: NEGATIVE
PH UR: 6.5 [PH] (ref 3–11)
PH, POC UA: 6
PLATELET # BLD AUTO: 494 X10(3)/MCL (ref 140–371)
PMV BLD AUTO: 8.8 FL (ref 9.4–12.4)
POC BLOOD, URINE: NEGATIVE
POC NITRATES, URINE: NEGATIVE
POTASSIUM SERPL-SCNC: 3.6 MMOL/L (ref 3.5–5.1)
PROT SERPL-MCNC: 7.3 GM/DL (ref 6.3–8.2)
PROT UR QL STRIP: POSITIVE
RBC # BLD AUTO: 4.69 X10(6)/MCL (ref 4–5.1)
RUBELLA AB, IGG (OLG): 15.2 IU/ML
SODIUM SERPL-SCNC: 136 MMOL/L (ref 136–145)
SP GR UR STRIP: 1.02 (ref 1–1.03)
SPECIMEN OUTDATE: NORMAL
T PALLIDUM AB SER QL: NONREACTIVE
UROBILINOGEN UR STRIP-ACNC: 4 (ref 0.1–1.1)
WBC # SPEC AUTO: 6.61 X10(3)/MCL (ref 4–11.5)

## 2024-05-21 PROCEDURE — 86803 HEPATITIS C AB TEST: CPT

## 2024-05-21 PROCEDURE — 76817 TRANSVAGINAL US OBSTETRIC: CPT | Mod: ,,, | Performed by: OBSTETRICS & GYNECOLOGY

## 2024-05-21 PROCEDURE — 80053 COMPREHEN METABOLIC PANEL: CPT

## 2024-05-21 PROCEDURE — 86850 RBC ANTIBODY SCREEN: CPT | Performed by: OBSTETRICS & GYNECOLOGY

## 2024-05-21 PROCEDURE — 87340 HEPATITIS B SURFACE AG IA: CPT

## 2024-05-21 PROCEDURE — 85027 COMPLETE CBC AUTOMATED: CPT

## 2024-05-21 PROCEDURE — 99204 OFFICE O/P NEW MOD 45 MIN: CPT | Mod: TH,,, | Performed by: OBSTETRICS & GYNECOLOGY

## 2024-05-21 PROCEDURE — 87389 HIV-1 AG W/HIV-1&-2 AB AG IA: CPT

## 2024-05-21 PROCEDURE — 76801 OB US < 14 WKS SINGLE FETUS: CPT | Mod: ,,, | Performed by: OBSTETRICS & GYNECOLOGY

## 2024-05-21 PROCEDURE — 86787 VARICELLA-ZOSTER ANTIBODY: CPT

## 2024-05-21 PROCEDURE — 86762 RUBELLA ANTIBODY: CPT

## 2024-05-21 PROCEDURE — 36415 COLL VENOUS BLD VENIPUNCTURE: CPT

## 2024-05-21 PROCEDURE — 86780 TREPONEMA PALLIDUM: CPT

## 2024-05-21 PROCEDURE — 83021 HEMOGLOBIN CHROMOTOGRAPHY: CPT

## 2024-05-22 LAB
VZV IGG SER IA-ACNC: 0.3
VZV IGG SER QL IA: NEGATIVE

## 2024-05-23 ENCOUNTER — APPOINTMENT (OUTPATIENT)
Dept: LAB | Facility: HOSPITAL | Age: 29
End: 2024-05-23
Attending: OBSTETRICS & GYNECOLOGY
Payer: MEDICAID

## 2024-05-23 LAB
HGB A MFR BLD ELPH: 97.2 % (ref 95.8–98)
HGB A2 MFR BLD ELPH: 2.3 % (ref 2–3.3)
HGB F MFR BLD ELPH: 0.5 % (ref 0–0.9)
HGB FRACT BLD ELPH-IMP: NORMAL
M HPLC HB VARIANT, B: NORMAL
PROT 24H UR-MCNC: NORMAL G/DL
PROT UR STRIP-MCNC: <5 MG/DL
TOTAL VOLUME  (OHS): 600 ML

## 2024-05-24 LAB — BACTERIA UR CULT: NORMAL

## 2024-06-17 NOTE — PROGRESS NOTES
Chief Complaint:  Routine Prenatal Visit    History of Present Illness:  Tony Ruggiero is a 28 y.o. year old  at 15w2d presents for her ob exam. She is doing well. No complaints today.       Review of Systems:  General/Constitutional: Fever denies. Headache denies.     Skin: Rash denies.   Respiratory: Cough denies. SOB denies. Wheezing denies .   Cardiovascular: Chest pain denies. Dizziness denies. Palpitations denies. Swelling in hands/feet denies.    Gastrointestinal: Abdominal pain denies. Constipation denies. Diarrhea denies. Heartburn denies. Nausea denies. Vomiting denies.  Genitourinary: Painful urination denies.   Gynecologic: Vaginal bleeding denies. Vaginal discharge Normal. Leaking amniotic fluid denies.  Contractions denies.  Psychiatric: Depressed mood denies. Suicidal thoughts denies.       Current Outpatient Medications:     aspirin 81 MG Chew, Take 81 mg by mouth once daily., Disp: , Rfl:     PNV no.153/FA/om3/dha/epa/fish (PRENATAL GUMMIES ORAL), Take 2 Units by mouth Daily., Disp: , Rfl:     Physical Exam:  /72   Wt 105.1 kg (231 lb 12.8 oz)   LMP 2024   BMI 38.57 kg/m²     Constitutional: General appearance: healthy, well-nourished and well-developed   Psychiatric:  Orientation to time, place and person. Normal mood and affect and active, alert   Abdomen: Auscultation/Inspection/Palpation: Gravid. No tenderness or masses. Soft, nondistended   Extremities: no CCE    FHT: 140      Assessment/Plan  1. Chronic hypertension  Pre-existing hypertension in pregnancy is associated with adverse fetal outcomes, including growth restriction, stillbirth,  delivery, and pregnancy risk for mom and baby to include abruption and superimposed preeclampsia.      Growth scans starting at 28 weeks and then every 3 to 4 weeks     Weekly testing starting at 32 to 34 weeks      EKG not performed    () ALT/AST: 32/  () 24hr urine protein: unable to calculate due to low protein  result      The [place] preventative task force placed the following recommendations in 2016 which ACOG supports regarding utilization of low dose 81mg ASA starting at 14 weeks and continuing through pregnancy in high risk pregnant women. The utilization of this has been shown to reduce the risk for preeclampsia by 24% in clinical trials and reduce the risk of  birth by 14% and IUGR about 20%.      Cont 81mg ASA   2. Obesity in pregnancy, antepartum  Patient educated on risks and complications of obesity and pregnancy including but not limited to first trimester miscarriage recurrent miscarriages, congenital anomalies, HTN disorders, gestational diabetes, macrosomia, PTL&D, higher risk of fetal demise in-utero and higher risk of CS (and wound complication including infection breakdown) with obesity.        3. Carrier of spinal muscular atrophy  Educated, reviewed results  Partner to be tested  Referral to [facility], level II, Dr. Garcia    4. 15 weeks gestation of pregnancy  Instructed on weight gain, healthy exercise, vitamins, avoidance of drugs tobacco and alcohol. Pain, fever, bleeding precautions.        Discussed with patient travel precautions.  MSAFP, anatomy scan  Follow up 4 weeks for ob check       Future Appointments   Date Time Provider Department Center   2024  8:20 AM Ilene Velez MD [facility] [facility]         This note was transcribed by Gladys Ford MA. There may be transcription errors as a result, however minimal. Effort has been made to ensure accuracy of transcription, but any obvious errors or omissions should be clarified with the author of the document.

## 2024-06-18 ENCOUNTER — ROUTINE PRENATAL (OUTPATIENT)
Dept: OBSTETRICS AND GYNECOLOGY | Facility: CLINIC | Age: 29
End: 2024-06-18
Payer: MEDICAID

## 2024-06-18 VITALS
WEIGHT: 231.81 LBS | BODY MASS INDEX: 38.57 KG/M2 | DIASTOLIC BLOOD PRESSURE: 72 MMHG | SYSTOLIC BLOOD PRESSURE: 118 MMHG

## 2024-06-18 DIAGNOSIS — O99.210 OBESITY IN PREGNANCY, ANTEPARTUM: ICD-10-CM

## 2024-06-18 DIAGNOSIS — Z3A.15 15 WEEKS GESTATION OF PREGNANCY: ICD-10-CM

## 2024-06-18 DIAGNOSIS — Z14.8 CARRIER OF SPINAL MUSCULAR ATROPHY: ICD-10-CM

## 2024-06-18 DIAGNOSIS — I10 CHRONIC HYPERTENSION: Primary | ICD-10-CM

## 2024-06-18 DIAGNOSIS — Z34.91 FIRST TRIMESTER PREGNANCY: ICD-10-CM

## 2024-06-18 LAB
BILIRUB UR QL STRIP: NORMAL
GLUCOSE UR QL STRIP: NEGATIVE
KETONES UR QL STRIP: NORMAL
LEUKOCYTE ESTERASE UR QL STRIP: NEGATIVE
PH, POC UA: NORMAL
POC BLOOD, URINE: NORMAL
POC NITRATES, URINE: NEGATIVE
PROT UR QL STRIP: NEGATIVE
SP GR UR STRIP: NORMAL (ref 1–1.03)
UROBILINOGEN UR STRIP-ACNC: NORMAL (ref 0.3–2.2)

## 2024-06-18 RX ORDER — NAPROXEN SODIUM 220 MG/1
81 TABLET, FILM COATED ORAL DAILY
COMMUNITY

## 2024-06-19 ENCOUNTER — LAB VISIT (OUTPATIENT)
Dept: LAB | Facility: HOSPITAL | Age: 29
End: 2024-06-19
Attending: OBSTETRICS & GYNECOLOGY
Payer: MEDICAID

## 2024-06-19 DIAGNOSIS — Z3A.15 15 WEEKS GESTATION OF PREGNANCY: ICD-10-CM

## 2024-06-19 DIAGNOSIS — O10.919 CHRONIC HYPERTENSION DURING PREGNANCY, ANTEPARTUM: Primary | ICD-10-CM

## 2024-06-19 PROCEDURE — 82105 ALPHA-FETOPROTEIN SERUM: CPT

## 2024-06-20 LAB — MAYO GENERIC ORDERABLE RESULT: NORMAL

## 2024-06-21 DIAGNOSIS — Z14.8 CARRIER OF SPINAL MUSCULAR ATROPHY: ICD-10-CM

## 2024-06-21 DIAGNOSIS — I10 CHRONIC HYPERTENSION: Primary | ICD-10-CM

## 2024-06-23 ENCOUNTER — PATIENT MESSAGE (OUTPATIENT)
Dept: OTHER | Facility: OTHER | Age: 29
End: 2024-06-23
Payer: MEDICAID

## 2024-06-24 ENCOUNTER — PROCEDURE VISIT (OUTPATIENT)
Dept: MATERNAL FETAL MEDICINE | Facility: CLINIC | Age: 29
End: 2024-06-24
Payer: MEDICAID

## 2024-06-24 ENCOUNTER — OFFICE VISIT (OUTPATIENT)
Dept: MATERNAL FETAL MEDICINE | Facility: CLINIC | Age: 29
End: 2024-06-24
Payer: MEDICAID

## 2024-06-24 VITALS
HEART RATE: 108 BPM | HEIGHT: 65 IN | SYSTOLIC BLOOD PRESSURE: 124 MMHG | BODY MASS INDEX: 39.13 KG/M2 | DIASTOLIC BLOOD PRESSURE: 84 MMHG | WEIGHT: 234.88 LBS

## 2024-06-24 DIAGNOSIS — O28.3 ECHOGENIC INTRACARDIAC FOCUS OF FETUS ON PRENATAL ULTRASOUND: ICD-10-CM

## 2024-06-24 DIAGNOSIS — O10.919 CHRONIC HYPERTENSION DURING PREGNANCY, ANTEPARTUM: ICD-10-CM

## 2024-06-24 DIAGNOSIS — Z14.8 CARRIER OF SPINAL MUSCULAR ATROPHY: ICD-10-CM

## 2024-06-24 DIAGNOSIS — O99.212 OTHER OBESITY AFFECTING PREGNANCY IN SECOND TRIMESTER: ICD-10-CM

## 2024-06-24 DIAGNOSIS — E66.8 OTHER OBESITY AFFECTING PREGNANCY IN SECOND TRIMESTER: ICD-10-CM

## 2024-06-24 DIAGNOSIS — Z14.8 CARRIER OF SPINAL MUSCULAR ATROPHY: Primary | ICD-10-CM

## 2024-06-24 DIAGNOSIS — O28.9 ABNORMAL FINDINGS ON PRENATAL SCREENING: Primary | ICD-10-CM

## 2024-06-24 DIAGNOSIS — O10.919 CHRONIC HYPERTENSION AFFECTING PREGNANCY: ICD-10-CM

## 2024-06-24 NOTE — ASSESSMENT & PLAN NOTE
There are  risks associated with obesity which include and increased risk of hypertension, preeclampsia, gestational diabetes, venous thromboembolic disease,  delivery, macrosomia (with resultant shoulder dystocia, obstetric sphincter injury), IUFD, longer first stage of labor, decreased TOLAC success rate, emergent & scheduled  & complications of  (prolonged OR time, delayed delivery, excessive EBL, infection, wound separation/infection, higher spinal failure rate, more difficult intubation).  Obesity is also associated with fetal anomalies, specifically neural tube defects.  Studies have shown that the rate of complication increases with rising BMI and thus pregnancies with maternal morbid obesity are at increased risk.      BMI 39    Recommendations:  TWG goal is 11-20 lbs  Screen for signs/symptoms of obstructive sleep apnea  Nutritionist consult offered (this is to be ordered by primary OB provider)  Consider early 1 hr GCT (not completed); routine at 24-28 weeks gestation  Continue low dose aspirin 81 mg daily for preeclampsia risk reduction  Targeted anatomical survey scheduled at 18-20 weeks (completed)  Fetal growth ultrasound at 32 weeks if BMI >= 40  Weekly  testing at 32 weeks if pre-pregnancy BMI >= 45  Lovenox 40 mg BID for VTE prophylaxis while admitted to the hospital (antepartum or postpartum) if BMI >= 40.   Encouraged breastfeeding  Postpartum lifestyle modifications & weight loss

## 2024-06-24 NOTE — PROGRESS NOTES
MATERNAL-FETAL MEDICINE   CONSULT NOTE    Provider requesting consultation: Dr Velez    SUBJECTIVE:     Ms. Tony Ruggiero is a 28 y.o.  female with IUP at 16w1d who is seen in consultation by MFM for evaluation and management of:  Problem   - Abnormal findings on prenatal screening   -BMI affecting pregnancy in second trimester   - Chronic hypertension affecting pregnancy   Echogenic Intracardiac Focus of Fetus On Prenatal Ultrasound     Tony is being referred for consultation due to a positive genetic carrier screening test. With routine prenatal labs, she opted to complete NIPT for genetic disorders as well as carrier testing. NIPT low risk for fetal trisomies (no gender). Marcella also resulted that she tested positive (high risk to be a carrier of SMA). Prior to this test, she was unaware of any carrier status for genetic disorders. She reports the FOB had labs drawn this morning to assess for SMA carrier status.  BMI 39 - no early 1h  She has a history of pre-eclampsia x 2 (delivered at 37w). She now has cHTN and is not on any antihypertensives at this time. Prior to pregnancy, she states she was on 3 blood pressure medications. She completed baseline labs and is taking a low dose aspirin daily.        Medication List with Changes/Refills   Current Medications    ASPIRIN 81 MG CHEW    Take 81 mg by mouth once daily.    PNV NO.153/FA/OM3/DHA/EPA/FISH (PRENATAL GUMMIES ORAL)    Take 2 Units by mouth Daily.       Review of patient's allergies indicates:  No Known Allergies    PMH:  Past Medical History:   Diagnosis Date    Gestational hypertension        PObHx:  OB History    Para Term  AB Living   4 3 3     3   SAB IAB Ectopic Multiple Live Births           3      # Outcome Date GA Lbr Jermaine/2nd Weight Sex Type Anes PTL Lv   4 Current            3 Term 05/10/22 37w5d  3.062 kg (6 lb 12 oz) M Vag-Spont EPI N LOLIS      Complications: Preeclampsia, Gestational hypertension, antepartum   2 Term  "20 37w6d  2.778 kg (6 lb 2 oz) F Vag-Spont EPI N LOLIS      Complications: Gestational hypertension, antepartum   1 Term 12/10/15 37w6d  2.92 kg (6 lb 7 oz) F Vag-Spont EPI N LOLIS       PSH:  Past Surgical History:   Procedure Laterality Date    LAPAROSCOPIC CHOLECYSTECTOMY N/A 2023    Procedure: CHOLECYSTECTOMY, LAPAROSCOPIC;  Surgeon: Екатерина Krishna MD;  Location: [facility];  Service: General;  Laterality: N/A;       Family history:family history includes Hypertension in her mother.    Social history: reports that she has never smoked. She has never been exposed to tobacco smoke. She has never used smokeless tobacco. She reports that she does not drink alcohol and does not use drugs.    Genetic history: SMA carrier; The patient denies any inherited genetic diseases or birth defects in herself or her partner's personal history or family.    Objective:   /84 (BP Location: Right arm)   Pulse 108   Ht 5' 5" (1.651 m)   Wt 106.5 kg (234 lb 14.4 oz)   LMP 2024   BMI 39.09 kg/m²     Ultrasound performed. See viewpoint for full ultrasound report.    A blackwood living IUP is identified, and the biometry is appropriate for established gestational age.    Early, limited anatomy appears normal. The placenta is posterior and has a subchorionic hematoma noted.    ASSESSMENT/PLAN:     28 y.o.  female with IUP at 16w1d     - Abnormal findings on prenatal screening  With routine prenatal labs, genetic carrier testing (NIPT through [facility]) was performed. 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 Prior to this pregnancy, she was unaware of any genetic conditions she may carry. The father of the baby had carrier testing drawn this morning with pending results.    We have discussed this motor neuron disease, briefly, with her. There are four different types of SMA with varying degrees of motor function. It is an autosomal recessive condition. We have discussed this " particular type of inheritance pattern. 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 If her partner is a carrier, the risk of passing SMA would be her risk of 1 in 34 plus the 25% chance they would both pass a mutated copy.    Further counseling will be provided once the FOB's results are available.    -BMI affecting pregnancy in second trimester  There are  risks associated with obesity which include and increased risk of hypertension, preeclampsia, gestational diabetes, venous thromboembolic disease,  delivery, macrosomia (with resultant shoulder dystocia, obstetric sphincter injury), IUFD, longer first stage of labor, decreased TOLAC success rate, emergent & scheduled  & complications of  (prolonged OR time, delayed delivery, excessive EBL, infection, wound separation/infection, higher spinal failure rate, more difficult intubation).  Obesity is also associated with fetal anomalies, specifically neural tube defects.  Studies have shown that the rate of complication increases with rising BMI and thus pregnancies with maternal morbid obesity are at increased risk.      BMI 39    Recommendations:  TWG goal is 11-20 lbs  Screen for signs/symptoms of obstructive sleep apnea  Nutritionist consult offered (this is to be ordered by primary OB provider)  Consider early 1 hr GCT (not completed); routine at 24-28 weeks gestation  Continue low dose aspirin 81 mg daily for preeclampsia risk reduction  Targeted anatomical survey scheduled at 18-20 weeks (completed)  Fetal growth ultrasound at 32 weeks if BMI >= 40  Weekly  testing at 32 weeks if pre-pregnancy BMI >= 45  Lovenox 40 mg BID for  VTE prophylaxis while admitted to the hospital (antepartum or postpartum) if BMI >= 40.   Encouraged breastfeeding  Postpartum lifestyle modifications & weight loss      - Chronic hypertension affecting pregnancy  Today I counseled the patient on maternal/fetal risks associated with CHTN during pregnancy. Risks include but not limited to fetal growth restriction, miscarriage, abruption, maternal end organ disease (renal failure, MI, and stroke),  delivery, development of superimposed preeclampsia, and eclampsia. She was counseled on the recommendations for blood pressure control, serial ultrasound for fetal growth assessment and  testing, and timing of delivery. I also counseled her on the recommendation for aspirin 81 mg daily which may decrease her risk of developing superimposed preeclampsia.     Recommendations ([facility]):  -Continue aspirin 81 mg daily for preeclampsia risk reduction  -No current medications  -Baseline evaluation with primary OB:   24-hour urine protein or baseline P/C ratio, CMP, and CBC (completed)  Maternal EKG  Maternal ophthalmic evaluation  Maternal echocardiogram if HTN has been long-standing or EKG is abnormal  -Serial fetal growth ultrasounds every 4-6 weeks, beginning at 26-28 weeks.   -Continued close observation of patient's blood pressures. Avoid hypotension as this has been associated with uteroplacental insufficiency.  -In conjunction with the CHAP study recommendation for BP control: If BP is persistently ?140/90 antihypertensive medication is recommended with goal BP of 120-140/80-90.  -Weekly antepartum testing at 32 weeks (NST+AFV); twice weekly testing if control is poor, multiple comorbidities are present, or requires several medications for control   -Delivery timing:  No medications, no comorbid conditions: 39 0/7 - 39 6/7 weeks gestation  No medications, comorbid conditions: 38 0/7 - 38 6/7 weeks gestation  Controlled on  single agent, no comorbid conditions: 38 0/7 - 38 6/7 weeks gestation  Controlled on single agent, comorbid conditions: 37 0/7 - 38 6/7 weeks gestation  Uncontrolled or requiring ? 2 medications: 36 0/7 - 37 6/7 weeks gestation    Comorbid conditions include BMI >= 40, diabetes, and complex medical condition associated with placental dysfunction (ie lupus or other vascular disease)  Delivery may be recommended earlier pending results of fetal growth ultrasounds, AFV assessment, or antepartum testing results.        Echogenic intracardiac focus of fetus on prenatal ultrasound  This is not a true structural abnormality and is not associated with congenital heart disease or abnormal cardiac function. It is seen as a normal variant in about 5 - 7% of pregnancies. Down syndrome fetuses have a higher incidence of echogenic foci than normal fetuses, therefore it is considered to be a potential marker for fetal Down syndrome. In a woman with other risk factors for Down syndrome (advanced maternal age, elevated quad screen risk or presence of other markers), the presence of an echogenic focus may increase the relative risk of Down syndrome slightly. In a younger woman (< age 35) or in a woman without other risk factors or markers for Down syndrome, the significance of an isolated echogenic focus is minimal. The overwhelming majority (99%) of these fetuses will not have Down syndrome. The presence of an EIF is an indication for a targeted anatomic survey, which was done today. No fetal major structural malformations or other minor markers associated with Down Syndrome were identified. Serum screening, either with a quad screen or cfDNA testing, can be used to provide a quantitative estimate of the chance for fetal Down Syndrome. She has completed NIPT with low risk results.             FOLLOW UP:   F/u in 4 weeks for US/MFM visit    This consultation was completed with the assistance of Armida Thurman NP.        Beata SALAMANCA  Jose ULLOA  Maternal Fetal Medicine

## 2024-06-24 NOTE — ASSESSMENT & PLAN NOTE
Today I counseled the patient on maternal/fetal risks associated with CHTN during pregnancy. Risks include but not limited to fetal growth restriction, miscarriage, abruption, maternal end organ disease (renal failure, MI, and stroke),  delivery, development of superimposed preeclampsia, and eclampsia. She was counseled on the recommendations for blood pressure control, serial ultrasound for fetal growth assessment and  testing, and timing of delivery. I also counseled her on the recommendation for aspirin 81 mg daily which may decrease her risk of developing superimposed preeclampsia.     Recommendations (Please refer to [facility] guidelines):  -Continue aspirin 81 mg daily for preeclampsia risk reduction  -No current medications  -Baseline evaluation with primary OB:   24-hour urine protein or baseline P/C ratio, CMP, and CBC (completed)  Maternal EKG  Maternal ophthalmic evaluation  Maternal echocardiogram if HTN has been long-standing or EKG is abnormal  -Serial fetal growth ultrasounds every 4-6 weeks, beginning at 26-28 weeks.   -Continued close observation of patient's blood pressures. Avoid hypotension as this has been associated with uteroplacental insufficiency.  -In conjunction with the CHAP study recommendation for BP control: If BP is persistently ?140/90 antihypertensive medication is recommended with goal BP of 120-140/80-90.  -Weekly antepartum testing at 32 weeks (NST+AFV); twice weekly testing if control is poor, multiple comorbidities are present, or requires several medications for control   -Delivery timing:  No medications, no comorbid conditions: 39 0/7 - 39 6/7 weeks gestation  No medications, comorbid conditions: 38 0/7 - 38 6/7 weeks gestation  Controlled on single agent, no comorbid conditions: 38 0/7 - 38 6/7 weeks gestation  Controlled on single agent, comorbid conditions: 37 0/7 - 38 6/7 weeks gestation  Uncontrolled or requiring ? 2 medications: 36 0/7 - 37 6/7 weeks  gestation    Comorbid conditions include BMI >= 40, diabetes, and complex medical condition associated with placental dysfunction (ie lupus or other vascular disease)  Delivery may be recommended earlier pending results of fetal growth ultrasounds, AFV assessment, or antepartum testing results.

## 2024-06-24 NOTE — ASSESSMENT & PLAN NOTE
With routine prenatal labs, genetic carrier testing (NIPT through [facility]) was performed. 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 Prior to this pregnancy, she was unaware of any genetic conditions she may carry. The father of the baby had carrier testing drawn this morning with pending results.    We have discussed this motor neuron disease, briefly, with her. There are four different types of SMA with varying degrees of motor function. It is an autosomal recessive condition. We have discussed this particular type of inheritance pattern. 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 If her partner is a carrier, the risk of passing SMA would be her risk of 1 in 34 plus the 25% chance they would both pass a mutated copy.    Further counseling will be provided once the FOB's results are available.

## 2024-06-24 NOTE — ASSESSMENT & PLAN NOTE
This is not a true structural abnormality and is not associated with congenital heart disease or abnormal cardiac function. It is seen as a normal variant in about 5 - 7% of pregnancies. Down syndrome fetuses have a higher incidence of echogenic foci than normal fetuses, therefore it is considered to be a potential marker for fetal Down syndrome. In a woman with other risk factors for Down syndrome (advanced maternal age, elevated quad screen risk or presence of other markers), the presence of an echogenic focus may increase the relative risk of Down syndrome slightly. In a younger woman (< age 35) or in a woman without other risk factors or markers for Down syndrome, the significance of an isolated echogenic focus is minimal. The overwhelming majority (99%) of these fetuses will not have Down syndrome. The presence of an EIF is an indication for a targeted anatomic survey, which was done today. No fetal major structural malformations or other minor markers associated with Down Syndrome were identified. Serum screening, either with a quad screen or cfDNA testing, can be used to provide a quantitative estimate of the chance for fetal Down Syndrome. She has completed NIPT with low risk results.

## 2024-06-30 ENCOUNTER — PATIENT MESSAGE (OUTPATIENT)
Dept: OTHER | Facility: OTHER | Age: 29
End: 2024-06-30
Payer: MEDICAID

## 2024-07-15 NOTE — PROGRESS NOTES
Chief Complaint:  Routine Prenatal Visit    History of Present Illness:  Tony Ruggiero is a 28 y.o. year old  at 19w2d presents for her ob exam. She is doing well. No complaints today.       Review of Systems:  General/Constitutional: Fever denies. Headache denies.     Skin: Rash denies.   Respiratory: Cough denies. SOB denies. Wheezing denies .   Cardiovascular: Chest pain denies. Dizziness denies. Palpitations denies. Swelling in hands/feet denies.    Gastrointestinal: Abdominal pain denies. Constipation denies. Diarrhea denies. Heartburn denies. Nausea denies. Vomiting denies.  Genitourinary: Painful urination denies.   Gynecologic: Vaginal bleeding denies. Vaginal discharge Normal. Leaking amniotic fluid denies.  Contractions denies.  Psychiatric: Depressed mood denies. Suicidal thoughts denies.       Current Outpatient Medications:     aspirin 81 MG Chew, Take 81 mg by mouth once daily., Disp: , Rfl:     PNV no.153/FA/om3/dha/epa/fish (PRENATAL GUMMIES ORAL), Take 2 Units by mouth Daily., Disp: , Rfl:     Physical Exam:  /84   Wt 107.5 kg (237 lb)   LMP 2024   BMI 39.44 kg/m²     Constitutional: General appearance: healthy, well-nourished and well-developed   Psychiatric:  Orientation to time, place and person. Normal mood and affect and active, alert   Abdomen: Auscultation/Inspection/Palpation: Gravid. No tenderness or masses. Soft, nondistended   Extremities: no CCE    FHT: 150      Assessment/Plan  1. Chronic hypertension  Pre-existing hypertension in pregnancy is associated with adverse fetal outcomes, including growth restriction, stillbirth,  delivery, and pregnancy risk for mom and baby to include abruption and superimposed preeclampsia.      Growth scans starting at 28 weeks and then every 3 to 4 weeks     Weekly testing starting at 32 to 34 weeks      EKG not performed, educated     () ALT/AST: 32/33  () 24hr urine protein: unable to calculate due to low protein  result      The [place] preventative task force placed the following recommendations in 2016 which ACOG supports regarding utilization of low dose 81mg ASA starting at 14 weeks and continuing through pregnancy in high risk pregnant women. The utilization of this has been shown to reduce the risk for preeclampsia by 24% in clinical trials and reduce the risk of  birth by 14% and IUGR about 20%.      Cont 81mg ASA     2. Obesity in pregnancy, antepartum  Patient educated on risks and complications of obesity and pregnancy including but not limited to first trimester miscarriage recurrent miscarriages, congenital anomalies, HTN disorders, gestational diabetes, macrosomia, PTL&D, higher risk of fetal demise in-utero and higher risk of CS (and wound complication including infection breakdown) with obesity.     3. Carrier of spinal muscular atrophy  Educated, discussed results  Partner unable to test due to work     4. 19 weeks gestation of pregnancy  Instructed on weight gain, healthy exercise, vitamins, avoidance of drugs tobacco and alcohol. Pain, fever, bleeding precautions.        Discussed with patient travel precautions.  Educated on EKG  Follow up 4 weeks       Educated on compliance of future appts  Future Appointments   Date Time Provider Department Center   2024  8:20 AM ROOM 2, [facility]   2024  9:00 AM [facility]       This note was transcribed by Gladys Ford MA. There may be transcription errors as a result, however minimal. Effort has been made to ensure accuracy of transcription, but any obvious errors or omissions should be clarified with the author of the document.

## 2024-07-16 ENCOUNTER — ROUTINE PRENATAL (OUTPATIENT)
Dept: OBSTETRICS AND GYNECOLOGY | Facility: CLINIC | Age: 29
End: 2024-07-16
Payer: MEDICAID

## 2024-07-16 ENCOUNTER — CLINICAL SUPPORT (OUTPATIENT)
Dept: RESPIRATORY THERAPY | Facility: HOSPITAL | Age: 29
End: 2024-07-16
Attending: OBSTETRICS & GYNECOLOGY
Payer: MEDICAID

## 2024-07-16 VITALS — WEIGHT: 237 LBS | DIASTOLIC BLOOD PRESSURE: 84 MMHG | SYSTOLIC BLOOD PRESSURE: 126 MMHG | BODY MASS INDEX: 39.44 KG/M2

## 2024-07-16 DIAGNOSIS — I10 CHRONIC HYPERTENSION: Primary | ICD-10-CM

## 2024-07-16 DIAGNOSIS — O99.210 OBESITY IN PREGNANCY, ANTEPARTUM: ICD-10-CM

## 2024-07-16 DIAGNOSIS — Z14.8 CARRIER OF SPINAL MUSCULAR ATROPHY: ICD-10-CM

## 2024-07-16 DIAGNOSIS — Z3A.19 19 WEEKS GESTATION OF PREGNANCY: ICD-10-CM

## 2024-07-16 DIAGNOSIS — I10 CHRONIC HYPERTENSION: ICD-10-CM

## 2024-07-16 LAB
BILIRUB UR QL STRIP: NORMAL
GLUCOSE UR QL STRIP: NEGATIVE
KETONES UR QL STRIP: NORMAL
LEUKOCYTE ESTERASE UR QL STRIP: NEGATIVE
OHS QRS DURATION: 78 MS
OHS QTC CALCULATION: 462 MS
PH, POC UA: NORMAL
POC BLOOD, URINE: NORMAL
POC NITRATES, URINE: NEGATIVE
PROT UR QL STRIP: NEGATIVE
SP GR UR STRIP: NORMAL (ref 1–1.03)
UROBILINOGEN UR STRIP-ACNC: NORMAL (ref 0.3–2.2)

## 2024-07-16 PROCEDURE — 93010 ELECTROCARDIOGRAM REPORT: CPT | Mod: ,,, | Performed by: INTERNAL MEDICINE

## 2024-07-16 PROCEDURE — 99214 OFFICE O/P EST MOD 30 MIN: CPT | Mod: TH,,, | Performed by: OBSTETRICS & GYNECOLOGY

## 2024-07-16 PROCEDURE — 93005 ELECTROCARDIOGRAM TRACING: CPT

## 2024-07-18 ENCOUNTER — TELEPHONE (OUTPATIENT)
Dept: OBSTETRICS AND GYNECOLOGY | Facility: CLINIC | Age: 29
End: 2024-07-18
Payer: MEDICAID

## 2024-07-18 DIAGNOSIS — O10.919 HTN IN PREGNANCY, CHRONIC: ICD-10-CM

## 2024-07-18 DIAGNOSIS — R94.31 ABNORMAL EKG: Primary | ICD-10-CM

## 2024-07-19 NOTE — TELEPHONE ENCOUNTER
----- Message from Ilene Velez MD sent at 7/16/2024 10:50 AM CDT -----  Refer to [facility]  ----- Message -----  From: Interface, [facility]  Sent: 7/16/2024  10:41 AM CDT  To: Ilene Velez MD

## 2024-07-21 ENCOUNTER — PATIENT MESSAGE (OUTPATIENT)
Dept: OTHER | Facility: OTHER | Age: 29
End: 2024-07-21
Payer: MEDICAID

## 2024-08-09 NOTE — PROGRESS NOTES
"Chief Complaint:  Routine Prenatal Visit (23.2 weeks)    History of Present Illness:  Tony Ruggiero is a 28 y.o. year old  at 23w2d presents for her ob exam. She is doing well. No complaints today.       Review of Systems:  General/Constitutional: Fever denies. Headache denies.     Skin: Rash denies.   Respiratory: Cough denies. SOB denies. Wheezing denies .   Cardiovascular: Chest pain denies. Dizziness denies. Palpitations denies. Swelling in hands/feet denies.    Gastrointestinal: Abdominal pain denies. Constipation denies. Diarrhea denies. Heartburn denies. Nausea denies. Vomiting denies.  Genitourinary: Painful urination denies.   Gynecologic: Vaginal bleeding denies. Vaginal discharge Normal. Leaking amniotic fluid denies.  Contractions denies.  Psychiatric: Depressed mood denies. Suicidal thoughts denies.       Current Outpatient Medications:     aspirin 81 MG Chew, Take 81 mg by mouth once daily., Disp: , Rfl:     PNV no.153/FA/om3/dha/epa/fish (PRENATAL GUMMIES ORAL), Take 2 Units by mouth Daily., Disp: , Rfl:     Physical Exam:  BP (!) 142/86   Temp 97.9 °F (36.6 °C) (Temporal)   Ht 5' 5" (1.651 m)   Wt 108.9 kg (240 lb)   LMP 2024   BMI 39.94 kg/m²   Vitals:    24 1322 24 1351   BP: (!) 148/88 (!) 142/86   BP Location: Right arm    Patient Position: Sitting    BP Method: Large (Manual)    Temp: 97.9 °F (36.6 °C)    TempSrc: Temporal    Weight: 108.9 kg (240 lb)    Height: 5' 5" (1.651 m)        Constitutional: General appearance: healthy, well-nourished and well-developed   Psychiatric:  Orientation to time, place and person. Normal mood and affect and active, alert   Abdomen: Auscultation/Inspection/Palpation: Gravid. No tenderness or masses. Soft, nondistended   Extremities: no CCE    FHT: 150      Assessment/Plan  1. Chronic hypertension  Pre-existing hypertension in pregnancy is associated with adverse fetal outcomes, including growth restriction, stillbirth,  " delivery, and pregnancy risk for mom and baby to include abruption and superimposed preeclampsia.      Growth scans starting at 28 weeks and then every 3 to 4 weeks     Weekly testing starting at 32 to 34 weeks     Advised to reschedule with MFM     EKG not performed, educated     () ALT/AST: 32  () 24hr urine protein: unable to calculate due to low protein result      The [place] preventative task force placed the following recommendations in 2016 which ACOG supports regarding utilization of low dose 81mg ASA starting at 14 weeks and continuing through pregnancy in high risk pregnant women. The utilization of this has been shown to reduce the risk for preeclampsia by 24% in clinical trials and reduce the risk of  birth by 14% and IUGR about 20%.      Cont 81mg ASA     Vitals:    24 1322 24 1351   BP: (!) 148/88 (!) 142/86    fu 1 wk     2. Obesity in pregnancy, antepartum  Patient educated on risks and complications of obesity and pregnancy including but not limited to first trimester miscarriage recurrent miscarriages, congenital anomalies, HTN disorders, gestational diabetes, macrosomia, PTL&D, higher risk of fetal demise in-utero and higher risk of CS (and wound complication including infection breakdown) with obesity.     3. Carrier of spinal muscular atrophy  Educated, discussed results  Partner testing negative     4. 23 weeks gestation of pregnancy  Instructed on weight gain, healthy exercise, vitamins, avoidance of drugs tobacco and alcohol. Pain, fever, bleeding precautions.        Discussed with patient travel precautions.    Educated  on importance of fu with MFM to complete anatomy survey     Educated on compliance of future appts  Future Appointments   Date Time Provider Department Center   2024  9:30 AM Ilene Velez MD [facility] KELLI [facility]         This note was transcribed by Gladys Ford MA. There may be transcription errors as a result, however minimal. I agree  with transcription and every effort has been made to ensure accuracy of transcription, but any obvious errors or omissions should be clarified with the author of the document.

## 2024-08-13 ENCOUNTER — ROUTINE PRENATAL (OUTPATIENT)
Dept: OBSTETRICS AND GYNECOLOGY | Facility: CLINIC | Age: 29
End: 2024-08-13
Payer: MEDICAID

## 2024-08-13 VITALS
HEIGHT: 65 IN | SYSTOLIC BLOOD PRESSURE: 142 MMHG | TEMPERATURE: 98 F | WEIGHT: 240 LBS | BODY MASS INDEX: 39.99 KG/M2 | DIASTOLIC BLOOD PRESSURE: 86 MMHG

## 2024-08-13 DIAGNOSIS — Z14.8 CARRIER OF SPINAL MUSCULAR ATROPHY: ICD-10-CM

## 2024-08-13 DIAGNOSIS — O99.210 OBESITY IN PREGNANCY, ANTEPARTUM: ICD-10-CM

## 2024-08-13 DIAGNOSIS — I10 CHRONIC HYPERTENSION: Primary | ICD-10-CM

## 2024-08-13 DIAGNOSIS — Z3A.23 23 WEEKS GESTATION OF PREGNANCY: ICD-10-CM

## 2024-08-13 LAB
BILIRUB UR QL STRIP: ABNORMAL
GLUCOSE UR QL STRIP: NEGATIVE
KETONES UR QL STRIP: ABNORMAL
LEUKOCYTE ESTERASE UR QL STRIP: POSITIVE
PH, POC UA: ABNORMAL
POC BLOOD, URINE: ABNORMAL
POC NITRATES, URINE: NEGATIVE
PROT UR QL STRIP: POSITIVE
SP GR UR STRIP: ABNORMAL (ref 1–1.03)
UROBILINOGEN UR STRIP-ACNC: ABNORMAL (ref 0.3–2.2)

## 2024-08-16 NOTE — PROGRESS NOTES
"Chief Complaint:  Routine Prenatal Visit    History of Present Illness:  Tony Ruggiero is a 28 y.o. year old  at 24w2d presents for for BP check. +fetal movement. Denies vaginal bleeding, vaginal discharge, LOF or contractions. Negative preeclampsia ROS. C/o daily headaches, no relief with Tylenol x2 tabs.      Review of Systems:  General/Constitutional: Fever denies. Headache ADMITS.    Skin: Rash denies.   Respiratory: Cough denies. SOB denies. Wheezing denies .   Cardiovascular: Chest pain denies. Dizziness denies. Palpitations denies. Swelling in hands/feet denies.    Gastrointestinal: Abdominal pain denies. Constipation denies. Diarrhea denies. Heartburn denies. Nausea denies. Vomiting denies.    Genitourinary: Painful urination denies.   Gynecologic: Vaginal bleeding denies. Vaginal discharge denies. Leaking amniotic fluid denies. Contractions denies.    Psychiatric: Depressed mood denies. Suicidal thoughts denies.       Current Outpatient Medications:     aspirin 81 MG Chew, Take 81 mg by mouth once daily., Disp: , Rfl:     PNV no.153/FA/om3/dha/epa/fish (PRENATAL GUMMIES ORAL), Take 2 Units by mouth Daily., Disp: , Rfl:       Physical Exam:  /84 (BP Location: Right arm, Patient Position: Sitting)   Temp 97.3 °F (36.3 °C)   Ht 5' 5" (1.651 m)   Wt 109.6 kg (241 lb 9.6 oz)   LMP 2024   BMI 40.20 kg/m²     Constitutional: General appearance: healthy, well-nourished and well-developed   Psychiatric:  Orientation to time, place and person. Normal mood and affect and active, alert   Abdomen: Auscultation/Inspection/Palpation: Gravid. No tenderness or masses. Soft, nondistended   Extremities: no CCE      FH: 24cm  FHT: 140      Assessment/Plan  1. Headache in pregnancy  Avoid stimulus, go in a dark quiet room     Hydration/Tylenol   ER precautions  Rx: Compazine 5 mg q6hrs PRN for headache  Has appt with ophthalmologist this week    2. Chronic hypertension  Pre-existing hypertension in " pregnancy is associated with adverse fetal outcomes, including growth restriction, stillbirth,  delivery, and pregnancy risk for mom and baby to include abruption and superimposed preeclampsia.      Growth scans starting at 28 weeks and then every 3 to 4 weeks     Weekly testing starting at 32 to 34 weeks      Keep MFM appt      EKG 24  Vent. Rate : 106 BPM     Atrial Rate : 106 BPM      P-R Int : 150 ms          QRS Dur : 078 ms       QT Int : 348 ms       P-R-T Axes : 071 022 023 degrees      QTc Int : 462 ms     Sinus tachycardia   Nonspecific T wave abnormality   Abnormal ECG   No previous ECGs available   Confirmed by Ryan Salinas MD ([id number]) on 2024 10:41:15 AM     [facility]IS consult  -   - Note reviewed with pt  - keep fu with pt     () ALT/AST: 32/33  () 24hr urine protein: unable to calculate due to low protein result      The [place] preventative task force placed the following recommendations in 2016 which ACOG supports regarding utilization of low dose 81mg ASA starting at 14 weeks and continuing through pregnancy in high risk pregnant women. The utilization of this has been shown to reduce the risk for preeclampsia by 24% in clinical trials and reduce the risk of  birth by 14% and IUGR about 20%.      Cont 81mg ASA      3. Obesity in pregnancy, antepartum  Patient educated on risks and complications of obesity and pregnancy including but not limited to first trimester miscarriage recurrent miscarriages, congenital anomalies, HTN disorders, gestational diabetes, macrosomia, PTL&D, higher risk of fetal demise in-utero and higher risk of CS (and wound complication including infection breakdown) with obesity.     4. Carrier of spinal muscular atrophy  Educated, discussed results  Partner testing negative     5. 24 weeks gestation of pregnancy  Discussed that should any of the following symptoms occur during pregnancy, patient should contact the office immediately or go to the ER after  business hours: spotting or bleeding, cramping, painful urination, severe vomiting, pain in one leg or calf, sudden gush of fluid, decrease or absence of fetal movement, sudden weight gain, periorbital or facial swelling, headache with visual changes and/or photophobia.       Discussed with patient travel precautions.  Educated on mirna  Follow up 3 weeks      Educated on compliance of future appts  Future Appointments   Date Time Provider Department Center   9/4/2024  2:20 PM ROOM 2, [facility]   9/4/2024  3:00 PM Beata Garcia MD [facility]       This note was transcribed by Gladys Ford MA. There may be transcription errors as a result, however minimal. I agree with transcription and every effort has been made to ensure accuracy of transcription, but any obvious errors or omissions should be clarified with the author of the document.

## 2024-08-18 ENCOUNTER — PATIENT MESSAGE (OUTPATIENT)
Dept: OTHER | Facility: OTHER | Age: 29
End: 2024-08-18
Payer: MEDICAID

## 2024-08-20 ENCOUNTER — ROUTINE PRENATAL (OUTPATIENT)
Dept: OBSTETRICS AND GYNECOLOGY | Facility: CLINIC | Age: 29
End: 2024-08-20
Payer: MEDICAID

## 2024-08-20 VITALS
BODY MASS INDEX: 40.26 KG/M2 | SYSTOLIC BLOOD PRESSURE: 138 MMHG | WEIGHT: 241.63 LBS | DIASTOLIC BLOOD PRESSURE: 84 MMHG | TEMPERATURE: 97 F | HEIGHT: 65 IN

## 2024-08-20 DIAGNOSIS — Z14.8 CARRIER OF SPINAL MUSCULAR ATROPHY: ICD-10-CM

## 2024-08-20 DIAGNOSIS — Z3A.24 24 WEEKS GESTATION OF PREGNANCY: ICD-10-CM

## 2024-08-20 DIAGNOSIS — R51.9 HEADACHE IN PREGNANCY, ANTEPARTUM: ICD-10-CM

## 2024-08-20 DIAGNOSIS — I10 CHRONIC HYPERTENSION: Primary | ICD-10-CM

## 2024-08-20 DIAGNOSIS — O26.899 HEADACHE IN PREGNANCY, ANTEPARTUM: ICD-10-CM

## 2024-08-20 DIAGNOSIS — O99.210 OBESITY IN PREGNANCY, ANTEPARTUM: ICD-10-CM

## 2024-08-20 LAB
BILIRUB UR QL STRIP: ABNORMAL
GLUCOSE UR QL STRIP: NEGATIVE
KETONES UR QL STRIP: ABNORMAL
LEUKOCYTE ESTERASE UR QL STRIP: NEGATIVE
PH, POC UA: ABNORMAL
POC BLOOD, URINE: ABNORMAL
POC NITRATES, URINE: NEGATIVE
PROT UR QL STRIP: POSITIVE
SP GR UR STRIP: ABNORMAL (ref 1–1.03)
UROBILINOGEN UR STRIP-ACNC: ABNORMAL (ref 0.3–2.2)

## 2024-08-20 RX ORDER — PROCHLORPERAZINE MALEATE 5 MG
5 TABLET ORAL EVERY 6 HOURS PRN
Qty: 30 TABLET | Refills: 0 | Status: SHIPPED | OUTPATIENT
Start: 2024-08-20 | End: 2025-08-20

## 2024-08-21 ENCOUNTER — LAB VISIT (OUTPATIENT)
Dept: LAB | Facility: HOSPITAL | Age: 29
End: 2024-08-21
Attending: OBSTETRICS & GYNECOLOGY
Payer: MEDICAID

## 2024-08-21 DIAGNOSIS — Z3A.24 24 WEEKS GESTATION OF PREGNANCY: ICD-10-CM

## 2024-08-21 LAB — GLUCOSE 1H P 100 G GLC PO SERPL-MCNC: 87 MG/DL (ref 100–180)

## 2024-08-21 PROCEDURE — 82950 GLUCOSE TEST: CPT

## 2024-08-27 ENCOUNTER — ROUTINE PRENATAL (OUTPATIENT)
Dept: OBSTETRICS AND GYNECOLOGY | Facility: CLINIC | Age: 29
End: 2024-08-27
Payer: MEDICAID

## 2024-08-27 VITALS — SYSTOLIC BLOOD PRESSURE: 136 MMHG | WEIGHT: 241 LBS | BODY MASS INDEX: 40.1 KG/M2 | DIASTOLIC BLOOD PRESSURE: 82 MMHG

## 2024-08-27 DIAGNOSIS — Z14.8 CARRIER OF SPINAL MUSCULAR ATROPHY: Primary | ICD-10-CM

## 2024-08-27 DIAGNOSIS — I10 CHRONIC HYPERTENSION: ICD-10-CM

## 2024-08-27 DIAGNOSIS — Z14.8 CARRIER OF SPINAL MUSCULAR ATROPHY: ICD-10-CM

## 2024-08-27 DIAGNOSIS — R51.9 HEADACHE IN PREGNANCY, ANTEPARTUM: Primary | ICD-10-CM

## 2024-08-27 DIAGNOSIS — O26.899 HEADACHE IN PREGNANCY, ANTEPARTUM: Primary | ICD-10-CM

## 2024-08-27 DIAGNOSIS — Z3A.25 25 WEEKS GESTATION OF PREGNANCY: ICD-10-CM

## 2024-08-27 DIAGNOSIS — O28.9 ABNORMAL FINDINGS ON PRENATAL SCREENING: ICD-10-CM

## 2024-08-27 DIAGNOSIS — O10.919 CHRONIC HYPERTENSION AFFECTING PREGNANCY: ICD-10-CM

## 2024-08-27 DIAGNOSIS — O99.210 OBESITY IN PREGNANCY, ANTEPARTUM: ICD-10-CM

## 2024-08-27 NOTE — PROGRESS NOTES
Chief Complaint:  Routine Prenatal Visit    History of Present Illness:  Tony Ruggiero is a 28 y.o. year old  at 25w2d presents for BP check. She is doing well. +fetal movement. Denies vaginal bleeding, vaginal discharge, LOF or contractions. Negative preeclampsia ROS.    Denies chest pain, SOB. No current headaches.       Review of Systems:  General/Constitutional: Fever denies. Headache denies.    Skin: Rash denies.   Respiratory: Cough denies. SOB denies. Wheezing denies .   Cardiovascular: Chest pain denies. Dizziness denies. Palpitations denies. Swelling in hands/feet denies.    Gastrointestinal: Abdominal pain denies. Constipation denies. Diarrhea denies. Heartburn denies. Nausea denies. Vomiting denies.    Genitourinary: Painful urination denies.   Gynecologic: Vaginal bleeding denies. Vaginal discharge denies. Leaking amniotic fluid denies. Contractions denies.    Psychiatric: Depressed mood denies. Suicidal thoughts denies.       Current Outpatient Medications:     aspirin 81 MG Chew, Take 81 mg by mouth once daily., Disp: , Rfl:     PNV no.153/FA/om3/dha/epa/fish (PRENATAL GUMMIES ORAL), Take 2 Units by mouth Daily., Disp: , Rfl:     prochlorperazine (COMPAZINE) 5 MG tablet, Take 1 tablet (5 mg total) by mouth every 6 (six) hours as needed (headaches)., Disp: 30 tablet, Rfl: 0      Physical Exam:  /82   Wt 109.3 kg (241 lb)   LMP 2024   BMI 40.10 kg/m²     Constitutional: General appearance: healthy, well-nourished and well-developed   Psychiatric:  Orientation to time, place and person. Normal mood and affect and active, alert   Abdomen: Auscultation/Inspection/Palpation: Gravid. No tenderness or masses. Soft, nondistended   Extremities: no CCE       FH: 25   FHT: 150      Assessment/Plan  1. Headache in pregnancy, antepartum  Resolved with Compazine 5 mg x1  No sx today  Avoid stimulus, go in a dark quiet room     Hydration/Tylenol   ER precautions    2. Chronic  hypertension  Pre-existing hypertension in pregnancy is associated with adverse fetal outcomes, including growth restriction, stillbirth,  delivery, and pregnancy risk for mom and baby to include abruption and superimposed preeclampsia.      Growth scans starting at 28 weeks and then every 3 to 4 weeks     Weekly testing starting at 32 to 34 weeks     BP stable today 136/82        EKG 24  Vent. Rate : 106 BPM     Atrial Rate : 106 BPM      P-R Int : 150 ms          QRS Dur : 078 ms       QT Int : 348 ms       P-R-T Axes : 071 022 023 degrees      QTc Int : 462 ms     Sinus tachycardia   Nonspecific T wave abnormality   Abnormal ECG   No previous ECGs available   Confirmed by Ryan Salinas MD ([id number]) on 2024 10:41:15 AM        [facility]IS consult  -   - Note reviewed with pt  - keep fu with cards 24     () ALT/AST:   () 24hr urine protein: unable to calculate due to low protein result      The [place] preventative task force placed the following recommendations in 2016 which ACOG supports regarding utilization of low dose 81mg ASA starting at 14 weeks and continuing through pregnancy in high risk pregnant women. The utilization of this has been shown to reduce the risk for preeclampsia by 24% in clinical trials and reduce the risk of  birth by 14% and IUGR about 20%.      Cont 81mg ASA      3. Obesity in pregnancy, antepartum  Patient educated on risks and complications of obesity and pregnancy including but not limited to first trimester miscarriage recurrent miscarriages, congenital anomalies, HTN disorders, gestational diabetes, macrosomia, PTL&D, higher risk of fetal demise in-utero and higher risk of CS (and wound complication including infection breakdown) with obesity.     4. Carrier of spinal muscular atrophy  Educated, discussed results  Partner testing negative     5. 25 weeks gestation of pregnancy  Discussed that should any of the following symptoms occur during pregnancy,  patient should contact the office immediately or go to the ER after business hours: spotting or bleeding, cramping, painful urination, severe vomiting, pain in one leg or calf, sudden gush of fluid, decrease or absence of fetal movement, sudden weight gain, periorbital or facial swelling, headache with visual changes and/or photophobia.       Discussed with patient travel precautions.  Follow up 3 weeks      Educated on compliance of future appts  Future Appointments   Date Time Provider Department Center   9/4/2024  2:20 PM ROOM 2, [facility]   9/4/2024  3:00 PM Beata Garcia MD [facility] [place]   9/24/2024  9:20 AM Ilene Velez MD [facility]       This note was transcribed by Gladys Ford MA. There may be transcription errors as a result, however minimal. I agree with transcription and every effort has been made to ensure accuracy of transcription, but any obvious errors or omissions should be clarified with the author of the document.

## 2024-09-01 ENCOUNTER — PATIENT MESSAGE (OUTPATIENT)
Dept: OTHER | Facility: OTHER | Age: 29
End: 2024-09-01
Payer: MEDICAID

## 2024-09-04 ENCOUNTER — OFFICE VISIT (OUTPATIENT)
Dept: MATERNAL FETAL MEDICINE | Facility: CLINIC | Age: 29
End: 2024-09-04
Payer: MEDICAID

## 2024-09-04 ENCOUNTER — PROCEDURE VISIT (OUTPATIENT)
Dept: MATERNAL FETAL MEDICINE | Facility: CLINIC | Age: 29
End: 2024-09-04
Payer: MEDICAID

## 2024-09-04 VITALS
BODY MASS INDEX: 40.3 KG/M2 | DIASTOLIC BLOOD PRESSURE: 86 MMHG | HEIGHT: 65 IN | WEIGHT: 241.88 LBS | HEART RATE: 85 BPM | SYSTOLIC BLOOD PRESSURE: 130 MMHG

## 2024-09-04 DIAGNOSIS — O10.919 CHRONIC HYPERTENSION AFFECTING PREGNANCY: ICD-10-CM

## 2024-09-04 DIAGNOSIS — O28.9 ABNORMAL FINDINGS ON PRENATAL SCREENING: ICD-10-CM

## 2024-09-04 DIAGNOSIS — Z14.8 CARRIER OF SPINAL MUSCULAR ATROPHY: ICD-10-CM

## 2024-09-04 DIAGNOSIS — O99.212 OBESITY AFFECTING PREGNANCY IN SECOND TRIMESTER, UNSPECIFIED OBESITY TYPE: ICD-10-CM

## 2024-09-04 DIAGNOSIS — O10.919 CHRONIC HYPERTENSION AFFECTING PREGNANCY: Primary | ICD-10-CM

## 2024-09-04 DIAGNOSIS — O28.3 ECHOGENIC INTRACARDIAC FOCUS OF FETUS ON PRENATAL ULTRASOUND: ICD-10-CM

## 2024-09-04 RX ORDER — LABETALOL 200 MG/1
200 TABLET, FILM COATED ORAL 2 TIMES DAILY
Qty: 60 TABLET | Refills: 11 | Status: SHIPPED | OUTPATIENT
Start: 2024-09-04 | End: 2025-09-04

## 2024-09-04 NOTE — ASSESSMENT & PLAN NOTE
Today I counseled the patient on maternal/fetal risks associated with CHTN during pregnancy. Risks include but not limited to fetal growth restriction, miscarriage, abruption, maternal end organ disease (renal failure, MI, and stroke),  delivery, development of superimposed preeclampsia, and eclampsia. She was counseled on the recommendations for blood pressure control, serial ultrasound for fetal growth assessment and  testing, and timing of delivery. I also counseled her on the recommendation for aspirin 81 mg daily which may decrease her risk of developing superimposed preeclampsia.     States that her blood pressure has been elevated at home repeatedly.  She is having some headaches additionally.  She has a strong history of chronic hypertension requiring multiple medications outside of pregnancy.  I recommend she start labetalol 200 mg twice daily at this time.    Recommendations (Please refer to [facility] guidelines):  -Continue aspirin 81 mg daily for preeclampsia risk reduction  -Start labetalol 200mg BID  -Baseline evaluation with primary OB:   24-hour urine protein or baseline P/C ratio, CMP, and CBC (completed)  Maternal EKG  Maternal ophthalmic evaluation  Maternal echocardiogram if HTN has been long-standing or EKG is abnormal  -Serial fetal growth ultrasounds every 4-6 weeks, beginning at 26-28 weeks.   -Continued close observation of patient's blood pressures. Avoid hypotension as this has been associated with uteroplacental insufficiency.  -In conjunction with the CHAP study recommendation for BP control: If BP is persistently >=140/90 antihypertensive medication is recommended with goal BP of 120-140/80-90.  -Weekly antepartum testing at 32 weeks (NST+AFV); twice weekly testing if control is poor, multiple comorbidities are present, or requires several medications for control   -Delivery timing:  No medications, no comorbid conditions: 39 0/7 - 39 6/7 weeks gestation  No  medications, comorbid conditions: 38 0/7 - 38 6/7 weeks gestation  Controlled on single agent, no comorbid conditions: 38 0/7 - 38 6/7 weeks gestation  Controlled on single agent, comorbid conditions: 37 0/7 - 38 6/7 weeks gestation  Uncontrolled or requiring >= 2 medications: 36 0/7 - 37 6/7 weeks gestation    Comorbid conditions include BMI >= 40, diabetes, and complex medical condition associated with placental dysfunction (ie lupus or other vascular disease)  Delivery may be recommended earlier pending results of fetal growth ultrasounds, AFV assessment, or antepartum testing results.

## 2024-09-04 NOTE — ASSESSMENT & PLAN NOTE
With routine prenatal labs, genetic carrier testing (NIPT through [facility]) was performed. 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 Prior to this pregnancy, she was unaware of any genetic conditions she may carry. The father of the baby had carrier testing drawn this morning with pending results.    We have discussed this motor neuron disease, briefly, with her. There are four different types of SMA with varying degrees of motor function. It is an autosomal recessive condition. We have discussed this particular type of inheritance pattern. 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 If her partner is a carrier, the risk of passing SMA would be her risk of 1 in 34 plus the 25% chance they would both pass a mutated copy.    9/4-FOB testing was negative.  The risk of SMA is significantly decreased based on this result.

## 2024-09-04 NOTE — ASSESSMENT & PLAN NOTE
This is not a true structural abnormality and is not associated with congenital heart disease or abnormal cardiac function. It is seen as a normal variant in about 5 - 7% of pregnancies. Down syndrome fetuses have a higher incidence of echogenic foci than normal fetuses, therefore it is considered to be a potential marker for fetal Down syndrome. In a woman with other risk factors for Down syndrome (advanced maternal age, elevated quad screen risk or presence of other markers), the presence of an echogenic focus may increase the relative risk of Down syndrome slightly. In a younger woman (< age 35) or in a woman without other risk factors or markers for Down syndrome, the significance of an isolated echogenic focus is minimal. The overwhelming majority (99%) of these fetuses will not have Down syndrome. The presence of an EIF is an indication for a targeted anatomic survey, which was done today. No fetal major structural malformations or other minor markers associated with Down Syndrome were identified. Serum screening, either with a quad screen or cfDNA testing, can be used to provide a quantitative estimate of the chance for fetal Down Syndrome. She has completed NIPT with low risk results.     Cardiac imaging is suboptimal today secondary to fetal position.

## 2024-09-23 NOTE — PROGRESS NOTES
"Chief Complaint:  Routine Prenatal Visit    History of Present Illness:  Tony Ruggiero is a 29 y.o. year old  at 29w2d presents for her ob exam. She is doing well. +fetal movement. Denies vaginal bleeding, vaginal discharge, LOF or contractions. Negative preeclampsia ROS.    Dr. Garcia started patient on Labetalol 200 mc BID, pt self discontinued due to blood pressure dropping to 100/60s and feeling lightheaded. Currently taking no meds.         Review of Systems:  General/Constitutional: Fever denies. Headache denies.    Skin: Rash denies.   Respiratory: Cough denies. SOB denies. Wheezing denies .   Cardiovascular: Chest pain denies. Dizziness denies. Palpitations denies. Swelling in hands/feet denies.    Gastrointestinal: Abdominal pain denies. Constipation denies. Diarrhea denies. Heartburn denies. Nausea denies. Vomiting denies.    Genitourinary: Painful urination denies.   Gynecologic: Vaginal bleeding denies. Vaginal discharge denies. Leaking amniotic fluid denies. Contractions denies.    Psychiatric: Depressed mood denies. Suicidal thoughts denies.       Current Outpatient Medications:     aspirin 81 MG Chew, Take 81 mg by mouth once daily., Disp: , Rfl:     labetaloL (NORMODYNE) 200 MG tablet, Take 1 tablet (200 mg total) by mouth 2 (two) times daily., Disp: 60 tablet, Rfl: 11    PNV no.153/FA/om3/dha/epa/fish (PRENATAL GUMMIES ORAL), Take 2 Units by mouth Daily., Disp: , Rfl:     prochlorperazine (COMPAZINE) 5 MG tablet, Take 1 tablet (5 mg total) by mouth every 6 (six) hours as needed (headaches)., Disp: 30 tablet, Rfl: 0      Physical Exam:  /80 (BP Location: Right arm, Patient Position: Sitting)   Temp 97 °F (36.1 °C)   Ht 5' 5" (1.651 m)   Wt 112.5 kg (248 lb)   LMP 2024   BMI 41.27 kg/m²     Constitutional: General appearance: healthy, well-nourished and well-developed   Psychiatric:  Orientation to time, place and person. Normal mood and affect and active, alert   Abdomen: " Auscultation/Inspection/Palpation: Gravid. No tenderness or masses. Soft, nondistended   Extremities: no CCE      FH: 29cm  FHT: 150      Assessment/Plan  1. Chronic hypertension  Pre-existing hypertension in pregnancy is associated with adverse fetal outcomes, including growth restriction, stillbirth,  delivery, and pregnancy risk for mom and baby to include abruption and superimposed preeclampsia.      Growth scans starting at 28 weeks and then every 3 to 4 weeks     Weekly testing starting at 32 to 34 weeks     Pt self discontinued Labetalol 200 mg due to BP dropping to 100/60s and feeling lightheaded.    BP today 12/80, no meds, dw pt to cont to not take at this time RTC 1 wk BP check       EKG 24  Vent. Rate : 106 BPM     Atrial Rate : 106 BPM      P-R Int : 150 ms          QRS Dur : 078 ms       QT Int : 348 ms       P-R-T Axes : 071 022 023 degrees      QTc Int : 462 ms     Sinus tachycardia   Nonspecific T wave abnormality   Abnormal ECG   No previous ECGs available   Confirmed by Ryan Salinsa MD ([id number]) on 2024 10:41:15 AM     CIS consult  -   - Note reviewed with pt   - ARACELI from  apt     () ALT/AST: 32/33  () 24hr urine protein: unable to calculate due to low protein result      The [place] preventative task force placed the following recommendations in 2016 which ACOG supports regarding utilization of low dose 81mg ASA starting at 14 weeks and continuing through pregnancy in high risk pregnant women. The utilization of this has been shown to reduce the risk for preeclampsia by 24% in clinical trials and reduce the risk of  birth by 14% and IUGR about 20%.      Cont 81mg ASA      2. Obesity in pregnancy, antepartum  Patient educated on risks and complications of obesity and pregnancy including but not limited to first trimester miscarriage recurrent miscarriages, congenital anomalies, HTN disorders, gestational diabetes, macrosomia, PTL&D, higher risk of fetal demise in-utero  and higher risk of CS (and wound complication including infection breakdown) with obesity.     3. Carrier of spinal muscular atrophy  Educated, discussed results  Partner testing negative     4. 29 weeks gestation of pregnancy  Discussed that should any of the following symptoms occur during pregnancy, patient should contact the office immediately or go to the ER after business hours: spotting or bleeding, cramping, painful urination, severe vomiting, pain in one leg or calf, sudden gush of fluid, decrease or absence of fetal movement, sudden weight gain, periorbital or facial swelling, headache with visual changes and/or photophobia.       Discussed with patient travel precautions.  CBC, RPR  Follow up 3 weeks for NST      Educated on compliance of future appts  Future Appointments   Date Time Provider Department Center   10/1/2024  9:20 AM Ilene Velez MD [facility]    10[facility]3/2024 10:20 [facility]   10/3/2024 11:00 AM Beata Garcia MD [facility]       This note was transcribed by Gladys Ford MA. There may be transcription errors as a result, however minimal. I agree with transcription and every effort has been made to ensure accuracy of transcription, but any obvious errors or omissions should be clarified with the author of the document.

## 2024-09-24 ENCOUNTER — ROUTINE PRENATAL (OUTPATIENT)
Dept: OBSTETRICS AND GYNECOLOGY | Facility: CLINIC | Age: 29
End: 2024-09-24
Payer: MEDICAID

## 2024-09-24 VITALS
DIASTOLIC BLOOD PRESSURE: 80 MMHG | HEIGHT: 65 IN | TEMPERATURE: 97 F | SYSTOLIC BLOOD PRESSURE: 128 MMHG | BODY MASS INDEX: 41.32 KG/M2 | WEIGHT: 248 LBS

## 2024-09-24 DIAGNOSIS — Z14.8 CARRIER OF SPINAL MUSCULAR ATROPHY: ICD-10-CM

## 2024-09-24 DIAGNOSIS — I10 CHRONIC HYPERTENSION: Primary | ICD-10-CM

## 2024-09-24 DIAGNOSIS — O99.210 OBESITY IN PREGNANCY, ANTEPARTUM: ICD-10-CM

## 2024-09-24 DIAGNOSIS — Z3A.29 29 WEEKS GESTATION OF PREGNANCY: ICD-10-CM

## 2024-09-24 PROBLEM — E87.6 HYPOKALEMIA: Status: RESOLVED | Noted: 2023-04-14 | Resolved: 2024-09-24

## 2024-09-24 PROBLEM — K81.0 ACUTE CHOLECYSTITIS: Status: RESOLVED | Noted: 2023-04-13 | Resolved: 2024-09-24

## 2024-09-24 PROCEDURE — 99214 OFFICE O/P EST MOD 30 MIN: CPT | Mod: TH,,, | Performed by: OBSTETRICS & GYNECOLOGY

## 2024-09-26 DIAGNOSIS — Z14.8 CARRIER OF SPINAL MUSCULAR ATROPHY: ICD-10-CM

## 2024-09-26 DIAGNOSIS — O10.919 CHRONIC HYPERTENSION AFFECTING PREGNANCY: Primary | ICD-10-CM

## 2024-09-29 ENCOUNTER — PATIENT MESSAGE (OUTPATIENT)
Dept: OTHER | Facility: OTHER | Age: 29
End: 2024-09-29
Payer: MEDICAID

## 2024-09-30 ENCOUNTER — LAB VISIT (OUTPATIENT)
Dept: LAB | Facility: HOSPITAL | Age: 29
End: 2024-09-30
Attending: OBSTETRICS & GYNECOLOGY
Payer: MEDICAID

## 2024-09-30 DIAGNOSIS — Z3A.29 29 WEEKS GESTATION OF PREGNANCY: ICD-10-CM

## 2024-09-30 LAB
ERYTHROCYTE [DISTWIDTH] IN BLOOD BY AUTOMATED COUNT: 14.3 % (ref 11–14.5)
HCT VFR BLD AUTO: 33.2 % (ref 36–48)
HGB BLD-MCNC: 11.1 G/DL (ref 11.8–16)
MCH RBC QN AUTO: 26.7 PG (ref 27–34)
MCHC RBC AUTO-ENTMCNC: 33.4 G/DL (ref 31–37)
MCV RBC AUTO: 79.8 FL (ref 79–99)
NRBC BLD AUTO-RTO: 0 %
PLATELET # BLD AUTO: 384 X10(3)/MCL (ref 140–371)
PMV BLD AUTO: 9 FL (ref 9.4–12.4)
RBC # BLD AUTO: 4.16 X10(6)/MCL (ref 4–5.1)
WBC # BLD AUTO: 10.84 X10(3)/MCL (ref 4–11.5)

## 2024-09-30 PROCEDURE — 86780 TREPONEMA PALLIDUM: CPT

## 2024-09-30 PROCEDURE — 36415 COLL VENOUS BLD VENIPUNCTURE: CPT

## 2024-09-30 PROCEDURE — 85027 COMPLETE CBC AUTOMATED: CPT

## 2024-09-30 NOTE — PROGRESS NOTES
"Chief Complaint:  Routine Prenatal Visit    History of Present Illness:  Tony Ruggiero is a 29 y.o. year old  at 30w1d presents for BP check. She is doing well. +fetal movement. Denies vaginal bleeding, vaginal discharge, LOF or contractions. Negative preeclampsia ROS.        Review of Systems:  General/Constitutional: Fever denies. Headache denies.    Skin: Rash denies.   Respiratory: Cough denies. SOB denies. Wheezing denies .   Cardiovascular: Chest pain denies. Dizziness denies. Palpitations denies. Swelling in hands/feet denies.    Gastrointestinal: Abdominal pain denies. Constipation denies. Diarrhea denies. Heartburn denies. Nausea denies. Vomiting denies.    Genitourinary: Painful urination denies.   Gynecologic: Vaginal bleeding denies. Vaginal discharge denies. Leaking amniotic fluid denies. Contractions denies.    Psychiatric: Depressed mood denies. Suicidal thoughts denies.       Current Outpatient Medications:     aspirin 81 MG Chew, Take 81 mg by mouth once daily., Disp: , Rfl:     PNV no.153/FA/om3/dha/epa/fish (PRENATAL GUMMIES ORAL), Take 2 Units by mouth Daily., Disp: , Rfl:     prochlorperazine (COMPAZINE) 5 MG tablet, Take 1 tablet (5 mg total) by mouth every 6 (six) hours as needed (headaches)., Disp: 30 tablet, Rfl: 0    labetaloL (NORMODYNE) 200 MG tablet, Take 1 tablet (200 mg total) by mouth 2 (two) times daily. (Patient not taking: Reported on 10/1/2024), Disp: 60 tablet, Rfl: 11      Physical Exam:  /80   Temp 97.3 °F (36.3 °C)   Ht 5' 5" (1.651 m)   Wt 113.4 kg (250 lb)   LMP 2024   BMI 41.60 kg/m²     Constitutional: General appearance: healthy, well-nourished and well-developed   Psychiatric:  Orientation to time, place and person. Normal mood and affect and active, alert   Abdomen: Auscultation/Inspection/Palpation: Gravid. No tenderness or masses. Soft, nondistended   Extremities: no CCE      FH: 30cm  FHT: 140      Assessment/Plan  1. Chronic " hypertension  Pre-existing hypertension in pregnancy is associated with adverse fetal outcomes, including growth restriction, stillbirth,  delivery, and pregnancy risk for mom and baby to include abruption and superimposed preeclampsia.      Growth scans starting at 28 weeks and then every 3 to 4 weeks     Weekly testing starting at 32 to 34 weeks      Pt self discontinued Labetalol 200 mg due to BP dropping to 100/60s and feeling lightheaded.   BP stable no meds keep fu with MFM 10/3     EKG 24  Vent. Rate : 106 BPM     Atrial Rate : 106 BPM      P-R Int : 150 ms          QRS Dur : 078 ms       QT Int : 348 ms       P-R-T Axes : 071 022 023 degrees      QTc Int : 462 ms     Sinus tachycardia   Nonspecific T wave abnormality   Abnormal ECG   No previous ECGs available   Confirmed by Ryan Salinas MD ([id number]) on 2024 10:41:15 AM      CIS consult  -   - Note reviewed with pt   - ARACELI from  apt     () ALT/AST: 32/33  () 24hr urine protein: unable to calculate due to low protein result      The [place] preventative task force placed the following recommendations in 2016 which ACOG supports regarding utilization of low dose 81mg ASA starting at 14 weeks and continuing through pregnancy in high risk pregnant women. The utilization of this has been shown to reduce the risk for preeclampsia by 24% in clinical trials and reduce the risk of  birth by 14% and IUGR about 20%.      Cont 81mg ASA      2. Obesity in pregnancy, antepartum  Patient educated on risks and complications of obesity and pregnancy including but not limited to first trimester miscarriage recurrent miscarriages, congenital anomalies, HTN disorders, gestational diabetes, macrosomia, PTL&D, higher risk of fetal demise in-utero and higher risk of CS (and wound complication including infection breakdown) with obesity.     3. Carrier of spinal muscular atrophy  Educated, discussed results  Partner testing negative     4. 30 weeks  gestation of pregnancy  Discussed that should any of the following symptoms occur during pregnancy, patient should contact the office immediately or go to the ER after business hours: spotting or bleeding, cramping, painful urination, severe vomiting, pain in one leg or calf, sudden gush of fluid, decrease or absence of fetal movement, sudden weight gain, periorbital or facial swelling, headache with visual changes and/or photophobia.       Discussed with patient travel precautions.  Follow up 2 weeks for NST, BPP      Educated on compliance of future appts  Future Appointments   Date Time Provider Department Center   10/3/2024 10:20 AM ROOM 2, [facility]   10/3/2024 11:00 AM Beata Garcia MD [facility]       This note was transcribed by Gladys Ford MA. There may be transcription errors as a result, however minimal. I agree with transcription and every effort has been made to ensure accuracy of transcription, but any obvious errors or omissions should be clarified with the author of the document.

## 2024-10-01 ENCOUNTER — ROUTINE PRENATAL (OUTPATIENT)
Dept: OBSTETRICS AND GYNECOLOGY | Facility: CLINIC | Age: 29
End: 2024-10-01
Payer: MEDICAID

## 2024-10-01 VITALS
TEMPERATURE: 97 F | WEIGHT: 250 LBS | HEIGHT: 65 IN | DIASTOLIC BLOOD PRESSURE: 80 MMHG | BODY MASS INDEX: 41.65 KG/M2 | SYSTOLIC BLOOD PRESSURE: 138 MMHG

## 2024-10-01 DIAGNOSIS — O99.210 OBESITY IN PREGNANCY, ANTEPARTUM: ICD-10-CM

## 2024-10-01 DIAGNOSIS — Z23 NEED FOR DIPHTHERIA-TETANUS-PERTUSSIS (TDAP) VACCINE: ICD-10-CM

## 2024-10-01 DIAGNOSIS — Z3A.30 30 WEEKS GESTATION OF PREGNANCY: ICD-10-CM

## 2024-10-01 DIAGNOSIS — Z14.8 CARRIER OF SPINAL MUSCULAR ATROPHY: ICD-10-CM

## 2024-10-01 DIAGNOSIS — I10 CHRONIC HYPERTENSION: Primary | ICD-10-CM

## 2024-10-01 LAB
BILIRUB UR QL STRIP: ABNORMAL
GLUCOSE UR QL STRIP: NEGATIVE
KETONES UR QL STRIP: ABNORMAL
LEUKOCYTE ESTERASE UR QL STRIP: POSITIVE
PH, POC UA: ABNORMAL
POC BLOOD, URINE: ABNORMAL
POC NITRATES, URINE: NEGATIVE
PROT UR QL STRIP: NEGATIVE
SP GR UR STRIP: ABNORMAL (ref 1–1.03)
T PALLIDUM AB SER QL: NONREACTIVE
UROBILINOGEN UR STRIP-ACNC: ABNORMAL (ref 0.3–2.2)

## 2024-10-03 ENCOUNTER — PROCEDURE VISIT (OUTPATIENT)
Dept: MATERNAL FETAL MEDICINE | Facility: CLINIC | Age: 29
End: 2024-10-03
Payer: MEDICAID

## 2024-10-03 ENCOUNTER — OFFICE VISIT (OUTPATIENT)
Dept: MATERNAL FETAL MEDICINE | Facility: CLINIC | Age: 29
End: 2024-10-03
Payer: MEDICAID

## 2024-10-03 VITALS
WEIGHT: 246.69 LBS | DIASTOLIC BLOOD PRESSURE: 89 MMHG | HEART RATE: 98 BPM | RESPIRATION RATE: 107 BRPM | SYSTOLIC BLOOD PRESSURE: 129 MMHG | HEIGHT: 65 IN | BODY MASS INDEX: 41.1 KG/M2

## 2024-10-03 DIAGNOSIS — E66.89 OTHER OBESITY AFFECTING PREGNANCY IN THIRD TRIMESTER: ICD-10-CM

## 2024-10-03 DIAGNOSIS — O10.919 CHRONIC HYPERTENSION AFFECTING PREGNANCY: Primary | ICD-10-CM

## 2024-10-03 DIAGNOSIS — Z14.8 CARRIER OF SPINAL MUSCULAR ATROPHY: ICD-10-CM

## 2024-10-03 DIAGNOSIS — O28.9 ABNORMAL FINDINGS ON PRENATAL SCREENING: ICD-10-CM

## 2024-10-03 DIAGNOSIS — O10.919 CHRONIC HYPERTENSION AFFECTING PREGNANCY: ICD-10-CM

## 2024-10-03 DIAGNOSIS — O99.213 OTHER OBESITY AFFECTING PREGNANCY IN THIRD TRIMESTER: ICD-10-CM

## 2024-10-03 NOTE — ASSESSMENT & PLAN NOTE
With routine prenatal labs, genetic carrier testing (NIPT through [facility]) was performed. 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 Prior to this pregnancy, she was unaware of any genetic conditions she may carry. The father of the baby had carrier testing drawn this morning with pending results.    We have discussed this motor neuron disease, briefly, with her. There are four different types of SMA with varying degrees of motor function. It is an autosomal recessive condition. We have discussed this particular type of inheritance pattern. 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 If her partner is a carrier, the risk of passing SMA would be her risk of 1 in 34 plus the 25% chance they would both pass a mutated copy.    9/4-FOB testing was negative.  The risk of SMA is significantly decreased based on this result.

## 2024-10-03 NOTE — ASSESSMENT & PLAN NOTE
Please see prior documentation for specific counseling.      BMI 41    Recommendations:  TWG goal is 11-20 lbs  Screen for signs/symptoms of obstructive sleep apnea  Nutritionist consult offered (this is to be ordered by primary OB provider)  Continue low dose aspirin 81 mg daily for preeclampsia risk reduction  Targeted anatomical survey scheduled at 18-20 weeks (completed)  Fetal growth ultrasound at 32 weeks if BMI >= 40  Weekly  testing at 32 weeks if pre-pregnancy BMI >= 45  Lovenox 40 mg BID for VTE prophylaxis while admitted to the hospital (antepartum or postpartum) if BMI >= 40.   Encouraged breastfeeding  Postpartum lifestyle modifications & weight loss

## 2024-10-03 NOTE — ASSESSMENT & PLAN NOTE
Previously counseled the patient on maternal/fetal risks associated with CHTN during pregnancy. Risks include but not limited to fetal growth restriction, miscarriage, abruption, maternal end organ disease (renal failure, MI, and stroke),  delivery, development of superimposed preeclampsia, and eclampsia. She was counseled on the recommendations for blood pressure control, serial ultrasound for fetal growth assessment and  testing, and timing of delivery. I also counseled her on the recommendation for aspirin 81 mg daily which may decrease her risk of developing superimposed preeclampsia.     10/3/24- Initial /87. Repeat /89. Previously, we Rx'ed Labetalol 200mg BID. She states she only took the medication one time and d/c'ed because it made her feel 'funny' and her BP dropped to 100s/60s, per her report. BP at primary OB's office two days ago 138/80.    Recommendations (Please refer to [facility] guidelines):  -Continue aspirin 81 mg daily for preeclampsia risk reduction  -No meds currently.   -Baseline evaluation with primary OB:   24-hour urine protein or baseline P/C ratio, CMP, and CBC (completed)  Maternal EKG  Maternal ophthalmic evaluation  Maternal echocardiogram if HTN has been long-standing or EKG is abnormal  -Serial fetal growth ultrasounds every 4-6 weeks, beginning at 26-28 weeks.   -Continued close observation of patient's blood pressures. Avoid hypotension as this has been associated with uteroplacental insufficiency.  -In conjunction with the CHAP study recommendation for BP control: If BP is persistently >=140/90 antihypertensive medication is recommended with goal BP of 120-140/80-90.  -Weekly antepartum testing at 32 weeks (NST+AFV); twice weekly testing if control is poor, multiple comorbidities are present, or requires several medications for control   -Delivery timing:  No medications, no comorbid conditions: 39 0/7 - 39 6/7 weeks gestation  No medications,  comorbid conditions: 38 0/7 - 38 6/7 weeks gestation  Controlled on single agent, no comorbid conditions: 38 0/7 - 38 6/7 weeks gestation  Controlled on single agent, comorbid conditions: 37 0/7 - 38 6/7 weeks gestation  Uncontrolled or requiring >= 2 medications: 36 0/7 - 37 6/7 weeks gestation    Comorbid conditions include BMI >= 40, diabetes, and complex medical condition associated with placental dysfunction (ie lupus or other vascular disease)  Delivery may be recommended earlier pending results of fetal growth ultrasounds, AFV assessment, or antepartum testing results.

## 2024-10-03 NOTE — PROGRESS NOTES
"Maternal Fetal Medicine follow up consult    SUBJECTIVE:     Tony Ruggiero is a 29 y.o.  female with IUP at 30w4d who is seen in follow up consultation by MFM.  Pregnancy complications include:   Problem   - Abnormal findings on prenatal screening   -BMI affecting pregnancy in third trimester   - Chronic hypertension affecting pregnancy     Tony presents for routine follow up appointment.  At last office visit w/ MFM, she was Rx'ed Labetalol 200mg BID d/t persistently elevated values on home BP log. She reports taking the medication one time and it made her BP drop to 100s/60s. She has not taken the medication again. BPs have been upper normal range since then.  Denies LOF, VB, contractions. Positive fetal movement.  She states her headaches are improved and she has not had to take Compazine recently.    Previous notes reviewed.   No changes to medical, surgical, family, social, or obstetric history.  Interval history since last M visit: see above  Medications reviewed.    Care team members:  Dr Velez - Primary OB     OBJECTIVE:   /89 (BP Location: Right arm, Patient Position: Sitting)   Pulse 98   Resp (!) 107   Ht 5' 5" (1.651 m)   Wt 111.9 kg (246 lb 11.1 oz)   LMP 2024   BMI 41.05 kg/m²     Ultrasound performed. See viewpoint for full ultrasound report.    A viable blackwood pregnancy is visualized in cephalic presentation.  Estimated fetal weight is at the 28th percentile with an abdominal circumference at the 22nd percentile.    No fetal abnormalities are noted and anatomic survey is suboptimal for LVOT and profile only. Amniotic fluid volume is normal.  Placenta is posterior.    ASSESSMENT/PLAN:     29 y.o.  female with IUP at 30w4d    - Chronic hypertension affecting pregnancy  Previously counseled the patient on maternal/fetal risks associated with CHTN during pregnancy. Risks include but not limited to fetal growth restriction, miscarriage, abruption, maternal end " organ disease (renal failure, MI, and stroke),  delivery, development of superimposed preeclampsia, and eclampsia. She was counseled on the recommendations for blood pressure control, serial ultrasound for fetal growth assessment and  testing, and timing of delivery. I also counseled her on the recommendation for aspirin 81 mg daily which may decrease her risk of developing superimposed preeclampsia.     10/3/24- Initial /87. Repeat /89. Previously, we Rx'ed Labetalol 200mg BID. She states she only took the medication one time and d/c'ed because it made her feel 'funny' and her BP dropped to 100s/60s, per her report. BP at primary OB's office two days ago 138/80.    Recommendations (Please refer to [facility] [employer] [facility] guidelines):  -Continue aspirin 81 mg daily for preeclampsia risk reduction  -No meds currently.   -Baseline evaluation with primary OB:   24-hour urine protein or baseline P/C ratio, CMP, and CBC (completed)  Maternal EKG  Maternal ophthalmic evaluation  Maternal echocardiogram if HTN has been long-standing or EKG is abnormal  -Serial fetal growth ultrasounds every 4-6 weeks, beginning at 26-28 weeks.   -Continued close observation of patient's blood pressures. Avoid hypotension as this has been associated with uteroplacental insufficiency.  -In conjunction with the CHAP study recommendation for BP control: If BP is persistently >=140/90 antihypertensive medication is recommended with goal BP of 120-140/80-90.  -Weekly antepartum testing at 32 weeks (NST+AFV); twice weekly testing if control is poor, multiple comorbidities are present, or requires several medications for control   -Delivery timing:  No medications, no comorbid conditions: 39 0/7 - 39 6/7 weeks gestation  No medications, comorbid conditions: 38 0/7 - 38 6/7 weeks gestation  Controlled on single agent, no comorbid conditions: 38 0/7 - 38 6/7 weeks gestation  Controlled on single agent, comorbid conditions: 37 0/7 -  38 6/7 weeks gestation  Uncontrolled or requiring >= 2 medications: 36 0/7 - 37 6/7 weeks gestation    Comorbid conditions include BMI >= 40, diabetes, and complex medical condition associated with placental dysfunction (ie lupus or other vascular disease)  Delivery may be recommended earlier pending results of fetal growth ultrasounds, AFV assessment, or antepartum testing results.        -BMI affecting pregnancy in third trimester  Please see prior documentation for specific counseling.      BMI 41    Recommendations:  TWG goal is 11-20 lbs  Screen for signs/symptoms of obstructive sleep apnea  Nutritionist consult offered (this is to be ordered by primary OB provider)  Continue low dose aspirin 81 mg daily for preeclampsia risk reduction  Targeted anatomical survey scheduled at 18-20 weeks (completed)  Fetal growth ultrasound at 32 weeks if BMI >= 40  Weekly  testing at 32 weeks if pre-pregnancy BMI >= 45  Lovenox 40 mg BID for VTE prophylaxis while admitted to the hospital (antepartum or postpartum) if BMI >= 40.   Encouraged breastfeeding  Postpartum lifestyle modifications & weight loss      - Abnormal findings on prenatal screening  With routine prenatal labs, genetic carrier testing (NIPT through [facility]) was performed. 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 Prior to this pregnancy, she was unaware of any genetic conditions she may carry. The father of the baby had carrier testing drawn this morning with pending results.    We have discussed this motor neuron disease, briefly, with her. There are four different types of SMA with varying degrees of motor function. It is an autosomal recessive condition. We have discussed this particular type of inheritance pattern. 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 If her partner is a carrier, the risk of passing SMA would be her risk of 1 in 34 plus the 25% chance they would both pass a mutated copy.    9/4-FOB testing was negative.  The risk of SMA is significantly decreased based on this result.    F/u in 4 weeks for MFM visit /growth ultrasound    Beata Garcia MD  Maternal Fetal Medicine

## 2024-10-05 ENCOUNTER — HOSPITAL ENCOUNTER (EMERGENCY)
Facility: HOSPITAL | Age: 29
Discharge: HOME | End: 2024-10-05
Attending: FAMILY MEDICINE
Payer: MEDICAID

## 2024-10-05 VITALS
DIASTOLIC BLOOD PRESSURE: 80 MMHG | SYSTOLIC BLOOD PRESSURE: 132 MMHG | HEART RATE: 101 BPM | TEMPERATURE: 98 F | RESPIRATION RATE: 18 BRPM | OXYGEN SATURATION: 99 % | HEIGHT: 65 IN | WEIGHT: 247 LBS | BODY MASS INDEX: 41.15 KG/M2

## 2024-10-05 DIAGNOSIS — R55 SYNCOPE, UNSPECIFIED SYNCOPE TYPE: Primary | ICD-10-CM

## 2024-10-05 LAB
ALBUMIN SERPL-MCNC: 3.6 G/DL (ref 3.4–5)
ALBUMIN/GLOB SERPL: 1.1 RATIO
ALP SERPL-CCNC: 81 UNIT/L (ref 50–144)
ALT SERPL-CCNC: 15 UNIT/L (ref 1–45)
ANION GAP SERPL CALC-SCNC: 6 MEQ/L (ref 2–13)
AST SERPL-CCNC: 23 UNIT/L (ref 14–36)
BASOPHILS # BLD AUTO: 0.02 X10(3)/MCL (ref 0.01–0.08)
BASOPHILS NFR BLD AUTO: 0.2 % (ref 0.1–1.2)
BILIRUB SERPL-MCNC: 0.2 MG/DL (ref 0–1)
BUN SERPL-MCNC: 7 MG/DL (ref 7–20)
CALCIUM SERPL-MCNC: 9.4 MG/DL (ref 8.4–10.2)
CHLORIDE SERPL-SCNC: 106 MMOL/L (ref 98–110)
CO2 SERPL-SCNC: 23 MMOL/L (ref 21–32)
CREAT SERPL-MCNC: 0.77 MG/DL (ref 0.66–1.25)
CREAT/UREA NIT SERPL: 9 (ref 12–20)
EOSINOPHIL # BLD AUTO: 0.23 X10(3)/MCL (ref 0.04–0.36)
EOSINOPHIL NFR BLD AUTO: 2 % (ref 0.7–7)
ERYTHROCYTE [DISTWIDTH] IN BLOOD BY AUTOMATED COUNT: 14.5 % (ref 11–14.5)
GFR SERPLBLD CREATININE-BSD FMLA CKD-EPI: >90 ML/MIN/1.73/M2
GLOBULIN SER-MCNC: 3.3 GM/DL (ref 2–3.9)
GLUCOSE SERPL-MCNC: 126 MG/DL (ref 70–115)
HCT VFR BLD AUTO: 33.2 % (ref 36–48)
HGB BLD-MCNC: 10.9 G/DL (ref 11.8–16)
IMM GRANULOCYTES # BLD AUTO: 0.1 X10(3)/MCL (ref 0–0.03)
IMM GRANULOCYTES NFR BLD AUTO: 0.9 % (ref 0–0.5)
LYMPHOCYTES # BLD AUTO: 2.05 X10(3)/MCL (ref 1.16–3.74)
LYMPHOCYTES NFR BLD AUTO: 17.8 % (ref 20–55)
MCH RBC QN AUTO: 26.3 PG (ref 27–34)
MCHC RBC AUTO-ENTMCNC: 32.8 G/DL (ref 31–37)
MCV RBC AUTO: 80 FL (ref 79–99)
MONOCYTES # BLD AUTO: 0.6 X10(3)/MCL (ref 0.24–0.36)
MONOCYTES NFR BLD AUTO: 5.2 % (ref 4.7–12.5)
NEUTROPHILS # BLD AUTO: 8.51 X10(3)/MCL (ref 1.56–6.13)
NEUTROPHILS NFR BLD AUTO: 73.9 % (ref 37–73)
NRBC BLD AUTO-RTO: 0 %
PLATELET # BLD AUTO: 402 X10(3)/MCL (ref 140–371)
PMV BLD AUTO: 8.9 FL (ref 9.4–12.4)
POTASSIUM SERPL-SCNC: 3.3 MMOL/L (ref 3.5–5.1)
PROT SERPL-MCNC: 6.9 GM/DL (ref 6.3–8.2)
RBC # BLD AUTO: 4.15 X10(6)/MCL (ref 4–5.1)
SODIUM SERPL-SCNC: 135 MMOL/L (ref 136–145)
WBC # BLD AUTO: 11.51 X10(3)/MCL (ref 4–11.5)

## 2024-10-05 PROCEDURE — 85025 COMPLETE CBC W/AUTO DIFF WBC: CPT | Performed by: FAMILY MEDICINE

## 2024-10-05 PROCEDURE — 80053 COMPREHEN METABOLIC PANEL: CPT | Performed by: FAMILY MEDICINE

## 2024-10-05 PROCEDURE — 96360 HYDRATION IV INFUSION INIT: CPT

## 2024-10-05 PROCEDURE — 25000003 PHARM REV CODE 250: Performed by: FAMILY MEDICINE

## 2024-10-05 PROCEDURE — 99284 EMERGENCY DEPT VISIT MOD MDM: CPT | Mod: 25

## 2024-10-05 RX ADMIN — SODIUM CHLORIDE 1000 ML: 9 INJECTION, SOLUTION INTRAVENOUS at 04:10

## 2024-10-05 NOTE — ED PROVIDER NOTES
Encounter Date: 10/5/2024       History       Chief Complaint  Patient presents with  · Loss of Consciousness    PRESENTS TO ED PER [facility] WITH C/O SYNCOPAL EPISODE. DENIES FALLING OR HITTING HEAD. CURRENTLY 8 MONTHS PREGNANT.  patient said that she had syncopal episode she briefly passed out for less than 40 seconds denies any abdominal pain denies any vaginal bleeding baby is moving fine no pregnancy-related issues          Review of patient's allergies indicates:  No Known Allergies  Past Medical History:   Diagnosis Date    Gestational hypertension      Past Surgical History:   Procedure Laterality Date    LAPAROSCOPIC CHOLECYSTECTOMY N/A 2023    Procedure: CHOLECYSTECTOMY, LAPAROSCOPIC;  Surgeon: Екатерина Krishna MD;  Location: [facility] OR;  Service: General;  Laterality: N/A;     Family History   Problem Relation Name Age of Onset    Hypertension Mother Betty new     Breast cancer Neg Hx      Ovarian cancer Neg Hx      Colon cancer Neg Hx      Uterine cancer Neg Hx      Cervical cancer Neg Hx       Social History     Tobacco Use    Smoking status: Never     Passive exposure: Never    Smokeless tobacco: Never   Substance Use Topics    Alcohol use: Never    Drug use: Never     Review of Systems   Constitutional:  Negative for activity change, appetite change, chills, diaphoresis and fever.   HENT:  Negative for congestion, dental problem, drooling, ear discharge, ear pain and sore throat.    Eyes:  Negative for pain, discharge, redness and itching.   Respiratory:  Negative for shortness of breath.    Cardiovascular:  Negative for chest pain.   Gastrointestinal:  Negative for nausea.   Endocrine: Negative for cold intolerance, heat intolerance, polydipsia and polyphagia.   Genitourinary:  Negative for decreased urine volume, difficulty urinating, dysuria, hematuria, menstrual problem, pelvic pain, vaginal bleeding and vaginal discharge.   Musculoskeletal:  Negative for arthralgias,  back pain and gait problem.   Skin:  Negative for rash.   Neurological:  Positive for syncope. Negative for dizziness, seizures, facial asymmetry, weakness, light-headedness, numbness and headaches.   Hematological:  Does not bruise/bleed easily.   Psychiatric/Behavioral:  Negative for agitation, behavioral problems, confusion, decreased concentration and dysphoric mood.        Physical Exam     Initial Vitals [10/05/24 1544]   BP Pulse Resp Temp SpO2   127/87 108 18 98.4 °F (36.9 °C) 98 %      MAP       --         Physical Exam    Nursing note and vitals reviewed.  Constitutional: She appears well-developed.   HENT:   Head: Normocephalic and atraumatic.   Eyes: Pupils are equal, round, and reactive to light.   Neck:   Normal range of motion.  Cardiovascular:  Normal rate, regular rhythm, normal heart sounds and intact distal pulses.           Pulmonary/Chest: Breath sounds normal.   Abdominal: Abdomen is soft. Bowel sounds are normal.   Abdomen is gravid no contractions felt   Musculoskeletal:         General: Normal range of motion.      Cervical back: Normal range of motion.     Neurological: She is alert and oriented to person, place, and time.   Skin: Skin is warm.   Psychiatric: She has a normal mood and affect.         ED Course   Procedures  Labs Reviewed   COMPREHENSIVE METABOLIC PANEL - Abnormal       Result Value    Sodium 135 (*)     Potassium 3.3 (*)     Chloride 106      CO2 23      Glucose 126 (*)     Blood Urea Nitrogen 7      Creatinine 0.77      Calcium 9.4      Protein Total 6.9      Albumin 3.6      Globulin 3.3      Albumin/Globulin Ratio 1.1      Bilirubin Total 0.2      ALP 81      ALT 15      AST 23      eGFR >90      Anion Gap 6.0      BUN/Creatinine Ratio 9 (*)    CBC WITH DIFFERENTIAL - Abnormal    WBC 11.51 (*)     RBC 4.15      Hgb 10.9 (*)     Hct 33.2 (*)     MCV 80.0      MCH 26.3 (*)     MCHC 32.8      RDW 14.5      Platelet 402 (*)     MPV 8.9 (*)     Neut % 73.9 (*)     Lymph %  17.8 (*)     Mono % 5.2      Eos % 2.0      Basophil % 0.2      Lymph # 2.05      Neut # 8.51 (*)     Mono # 0.60 (*)     Eos # 0.23      Baso # 0.02      IG# 0.10 (*)     IG% 0.9 (*)     NRBC% 0.0     CBC W/ AUTO DIFFERENTIAL    Narrative:     The following orders were created for panel order CBC Auto Differential.  Procedure                               Abnormality         Status                     ---------                               -----------         ------                     CBC with Differential[[id number]]       Abnormal            Final result                 Please view results for these tests on the individual orders.          Imaging Results    None          Medications   sodium chloride 0.9% bolus 1,000 mL 1,000 mL (1,000 mLs Intravenous New Bag 10/5/24 4213)     Medical Decision Making                                    Clinical Impression:  Final diagnoses:  [R55] Syncope, unspecified syncope type (Primary)          ED Disposition Condition    Discharge Stable          ED Prescriptions    None       Follow-up Information    None          Yajaira Sinclair MD  10/05/24 2452

## 2024-10-10 NOTE — PROGRESS NOTES
"Chief Complaint:  Routine Prenatal Visit    History of Present Illness:  Tony Ruggiero is a 29 y.o. year old  at 32w1d presents for her ob exam. She is doing well. +fetal movement. Denies vaginal bleeding, vaginal discharge, LOF or contractions. Negative preeclampsia ROS.    [place], presented to [facility] ER after. Denies feeling lightheaded or dizzy today. [place] abdominal trauma [place] the [place].        Review of Systems:  General/Constitutional: Fever denies. Headache denies.    Skin: Rash denies.   Respiratory: Cough denies. SOB denies. Wheezing denies .   Cardiovascular: Chest pain denies. Dizziness denies. Palpitations denies. Swelling in hands/feet denies.    Gastrointestinal: Abdominal pain denies. Constipation denies. Diarrhea denies. Heartburn denies. Nausea denies. Vomiting denies.    Genitourinary: Painful urination denies.   Gynecologic: Vaginal bleeding denies. Vaginal discharge denies. Leaking amniotic fluid denies. Contractions denies.    Psychiatric: Depressed mood denies. Suicidal thoughts denies.       Current Outpatient Medications:     aspirin 81 MG Chew, Take 81 mg by mouth once daily., Disp: , Rfl:     PNV no.153/FA/om3/dha/epa/fish (PRENATAL GUMMIES ORAL), Take 2 Units by mouth Daily., Disp: , Rfl:     prochlorperazine (COMPAZINE) 5 MG tablet, Take 1 tablet (5 mg total) by mouth every 6 (six) hours as needed (headaches)., Disp: 30 tablet, Rfl: 0      Physical Exam:  /82   Temp 96.8 °F (36 °C)   Ht 5' 5" (1.651 m)   Wt 113.4 kg (250 lb)   LMP 2024   BMI 41.60 kg/m²     Constitutional: General appearance: healthy, well-nourished and well-developed   Psychiatric:  Orientation to time, place and person. Normal mood and affect and active, alert   Abdomen: Auscultation/Inspection/Palpation: Gravid. No tenderness or masses. Soft, nondistended   Extremities: no CCE    NST:   - Baseline: 140s  - Variability: moderate  - Accelerations: absent  - Decelerations: " PRESENT: subtle to 130s, 30 seconds  - Time on monitor: 20 minutes  - Category: .nonreactive    [place]:   - CTX: ABSENT        Assessment/Plan  1. Equivocal NST  Educated  Pt to JEAN for prolong monitoring & BPP    2. Chronic hypertension  Reviewed MFM note w/ pt    Pre-existing hypertension in pregnancy is associated with adverse fetal outcomes, including growth restriction, stillbirth,  delivery, and pregnancy risk for mom and baby to include abruption and superimposed preeclampsia.      Growth scans starting at 28 weeks and then every 3 to 4 weeks   Last growth , keep follow MFM appt  Weekly testing     Pt self discontinued Labetalol 200 mg due to BP dropping to 100/60s and feeling lightheaded.  No medications       EKG 24  Vent. Rate : 106 BPM     Atrial Rate : 106 BPM      P-R Int : 150 ms          QRS Dur : 078 ms       QT Int : 348 ms       P-R-T Axes : 071 022 023 degrees      QTc Int : 462 ms     Sinus tachycardia   Nonspecific T wave abnormality   Abnormal ECG   No previous ECGs available   Confirmed by Ryan Salinas MD ([id number]) on 2024 10:41:15 AM      CIS consult  -   - Note reviewed with pt    (10/5) ALT/AST: 15  () ALT/AST: 32/33  () 24hr urine protein: unable to calculate due to low protein result      The [place] preventative task force placed the following recommendations in 2016 which ACOG supports regarding utilization of low dose 81mg ASA starting at 14 weeks and continuing through pregnancy in high risk pregnant women. The utilization of this has been shown to reduce the risk for preeclampsia by 24% in clinical trials and reduce the risk of  birth by 14% and IUGR about 20%.      Cont 81mg ASA      3. Obesity in pregnancy, antepartum  Patient educated on risks and complications of obesity and pregnancy including but not limited to first trimester miscarriage recurrent miscarriages, congenital anomalies, HTN disorders, gestational diabetes, macrosomia, PTL&D, higher risk  of fetal demise in-utero and higher risk of CS (and wound complication including infection breakdown) with obesity.     4. Carrier of spinal muscular atrophy  Educated, discussed results  Partner testing negative     5. 32 weeks gestation of pregnancy  Discussed that should any of the following symptoms occur during pregnancy, patient should contact the office immediately or go to the ER after business hours: spotting or bleeding, cramping, painful urination, severe vomiting, pain in one leg or calf, sudden gush of fluid, decrease or absence of fetal movement, sudden weight gain, periorbital or facial swelling, headache with visual changes and/or photophobia.       Discussed with patient travel precautions.  Patient to  for monitoring  Follow up 1 week for NST      Educated on compliance of future appts  Future Appointments   Date Time Provider Department Center   11/4/2024  2:20 PM [facility]   11/4/2024  3:00 PM Beata Garcia MD [facility]       This note was transcribed by Gladys Ford MA. There may be transcription errors as a result, however minimal. I agree with transcription and every effort has been made to ensure accuracy of transcription, but any obvious errors or omissions should be clarified with the author of the document.

## 2024-10-13 ENCOUNTER — PATIENT MESSAGE (OUTPATIENT)
Dept: OTHER | Facility: OTHER | Age: 29
End: 2024-10-13
Payer: MEDICAID

## 2024-10-14 ENCOUNTER — HOSPITAL ENCOUNTER (OUTPATIENT)
Facility: HOSPITAL | Age: 29
Discharge: HOME | End: 2024-10-14
Attending: OBSTETRICS & GYNECOLOGY | Admitting: OBSTETRICS & GYNECOLOGY
Payer: MEDICAID

## 2024-10-14 ENCOUNTER — CLINICAL SUPPORT (OUTPATIENT)
Dept: OBSTETRICS AND GYNECOLOGY | Facility: CLINIC | Age: 29
End: 2024-10-14
Payer: MEDICAID

## 2024-10-14 VITALS
SYSTOLIC BLOOD PRESSURE: 120 MMHG | BODY MASS INDEX: 41.65 KG/M2 | DIASTOLIC BLOOD PRESSURE: 82 MMHG | WEIGHT: 250 LBS | HEIGHT: 65 IN | TEMPERATURE: 97 F

## 2024-10-14 VITALS — DIASTOLIC BLOOD PRESSURE: 72 MMHG | SYSTOLIC BLOOD PRESSURE: 122 MMHG | HEART RATE: 83 BPM

## 2024-10-14 DIAGNOSIS — Z36.89 ENCOUNTER FOR TRIAGE IN PREGNANT PATIENT: ICD-10-CM

## 2024-10-14 DIAGNOSIS — O99.210 OBESITY IN PREGNANCY, ANTEPARTUM: ICD-10-CM

## 2024-10-14 DIAGNOSIS — Z3A.32 32 WEEKS GESTATION OF PREGNANCY: ICD-10-CM

## 2024-10-14 DIAGNOSIS — O28.8 EQUIVOCAL NON-STRESS TEST: ICD-10-CM

## 2024-10-14 DIAGNOSIS — I10 CHRONIC HYPERTENSION: Primary | ICD-10-CM

## 2024-10-14 DIAGNOSIS — Z14.8 CARRIER OF SPINAL MUSCULAR ATROPHY: ICD-10-CM

## 2024-10-14 PROCEDURE — 59025 FETAL NON-STRESS TEST: CPT

## 2024-10-14 PROCEDURE — 99211 OFF/OP EST MAY X REQ PHY/QHP: CPT | Mod: 25

## 2024-10-14 RX ORDER — SODIUM CHLORIDE, SODIUM LACTATE, POTASSIUM CHLORIDE, CALCIUM CHLORIDE 600; 310; 30; 20 MG/100ML; MG/100ML; MG/100ML; MG/100ML
INJECTION, SOLUTION INTRAVENOUS CONTINUOUS
Status: DISCONTINUED | OUTPATIENT
Start: 2024-10-14 | End: 2024-10-14 | Stop reason: HOSPADM

## 2024-10-14 RX ORDER — ACETAMINOPHEN 500 MG
500 TABLET ORAL EVERY 6 HOURS PRN
Status: DISCONTINUED | OUTPATIENT
Start: 2024-10-14 | End: 2024-10-14 | Stop reason: HOSPADM

## 2024-10-14 NOTE — NURSING
0910- presented to unit from Dr Velez's office for prolonged monitoring, bpp, and po hydration. Placed in exam room on continuous efm and toco. Vital signs obtained. PO hydration provided, pedialyte packet in 20 oz bottle of water and large pitcher of water provided. Plan of care discussed and verbalizes understanding. Call bell within reach and side rails up x 2.     1157- Dr velez notified of status and results of bpp. Orders to discharge with follow up on Thursday. Encourage aggressive hydration at home

## 2024-10-15 NOTE — PROGRESS NOTES
Chief Complaint:  Routine Prenatal Visit    History of Present Illness:  Tony Ruggiero is a 29 y.o. year old  at 32w4d presents for her ob exam. She is doing well. +fetal movement. Denies vaginal bleeding, vaginal discharge, LOF or contractions. Negative preeclampsia ROS.        Review of Systems:  General/Constitutional: Fever denies. Headache denies.    Skin: Rash denies.   Respiratory: Cough denies. SOB denies. Wheezing denies .   Cardiovascular: Chest pain denies. Dizziness denies. Palpitations denies. Swelling in hands/feet denies.    Gastrointestinal: Abdominal pain denies. Constipation denies. Diarrhea denies. Heartburn denies. Nausea denies. Vomiting denies.    Genitourinary: Painful urination denies.   Gynecologic: Vaginal bleeding denies. Vaginal discharge denies. Leaking amniotic fluid denies. Contractions denies.    Psychiatric: Depressed mood denies. Suicidal thoughts denies.       Current Outpatient Medications:     aspirin 81 MG Chew, Take 81 mg by mouth once daily., Disp: , Rfl:     PNV no.153/FA/om3/dha/epa/fish (PRENATAL GUMMIES ORAL), Take 2 Units by mouth Daily., Disp: , Rfl:     prochlorperazine (COMPAZINE) 5 MG tablet, Take 1 tablet (5 mg total) by mouth every 6 (six) hours as needed (headaches)., Disp: 30 tablet, Rfl: 0  No current facility-administered medications for this visit.      Physical Exam:  /82   Wt 113.7 kg (250 lb 9.6 oz)   LMP 2024   BMI 41.70 kg/m²        Constitutional: General appearance: healthy, well-nourished and well-developed   Psychiatric:  Orientation to time, place and person. Normal mood and affect and active, alert   Abdomen: Auscultation/Inspection/Palpation: Gravid. No tenderness or masses. Soft, nondistended   Extremities: no CCE      NST:   - Baseline: 140-150s  - Variability: moderate  - Accelerations: PRESENT: 15X15   - Decelerations: PRESENT: to 130s x1 minute  - Time on monitor: 20 minutes  - Category: .reactive, category 1    [place]:    - CTX: ABSENT        Assessment/Plan  1. Chronic hypertension  Reviewed MFM note w/ pt     Pre-existing hypertension in pregnancy is associated with adverse fetal outcomes, including growth restriction, stillbirth,  delivery, and pregnancy risk for mom and baby to include abruption and superimposed preeclampsia.      Growth scans starting at 28 weeks and then every 3 to 4 weeks   Last growth , keep follow MFM appt  Weekly testing  BPP 10/14 was 8/8     Pt self discontinued Labetalol 200 mg due to BP dropping to 100/60s and feeling lightheaded.  No medications        EKG 24  Vent. Rate : 106 BPM     Atrial Rate : 106 BPM      P-R Int : 150 ms          QRS Dur : 078 ms       QT Int : 348 ms       P-R-T Axes : 071 022 023 degrees      QTc Int : 462 ms     Sinus tachycardia   Nonspecific T wave abnormality   Abnormal ECG   No previous ECGs available   Confirmed by Ryan Salinas MD ([id number]) on 2024 10:41:15 AM      CIS consult  -   - Note reviewed with pt     (10/5) ALT/AST: 15  () ALT/AST: 32/33  () 24hr urine protein: unable to calculate due to low protein result      The [place] preventative task force placed the following recommendations in 2016 which ACOG supports regarding utilization of low dose 81mg ASA starting at 14 weeks and continuing through pregnancy in high risk pregnant women. The utilization of this has been shown to reduce the risk for preeclampsia by 24% in clinical trials and reduce the risk of  birth by 14% and IUGR about 20%.      Cont 81mg ASA      2. Obesity in pregnancy, antepartum  Patient educated on risks and complications of obesity and pregnancy including but not limited to first trimester miscarriage recurrent miscarriages, congenital anomalies, HTN disorders, gestational diabetes, macrosomia, PTL&D, higher risk of fetal demise in-utero and higher risk of CS (and wound complication including infection breakdown) with obesity.     3. Carrier of spinal muscular  atrophy  Educated, discussed results  Partner testing negative     4. 32 weeks gestation of pregnancy  Discussed that should any of the following symptoms occur during pregnancy, patient should contact the office immediately or go to the ER after business hours: spotting or bleeding, cramping, painful urination, severe vomiting, pain in one leg or calf, sudden gush of fluid, decrease or absence of fetal movement, sudden weight gain, periorbital or facial swelling, headache with visual changes and/or photophobia.       Discussed with patient travel precautions.    Pt to  for prolong monitoring      Educated on compliance of future appts  Future Appointments   Date Time Provider Department Center   10/22/2024  8:00 AM Ileen Velez MD [facility]   11/4/2024  2:20 PM ROOM 2, [facility] [place]   11/4/2024  3:00 PM Beata Garcia MD [facility]       This note was transcribed by Gladys Ford MA. There may be transcription errors as a result, however minimal. I agree with transcription and every effort has been made to ensure accuracy of transcription, but any obvious errors or omissions should be clarified with the author of the document.

## 2024-10-17 ENCOUNTER — HOSPITAL ENCOUNTER (OUTPATIENT)
Facility: HOSPITAL | Age: 29
Discharge: HOME | End: 2024-10-17
Attending: OBSTETRICS & GYNECOLOGY | Admitting: OBSTETRICS & GYNECOLOGY
Payer: MEDICAID

## 2024-10-17 ENCOUNTER — CLINICAL SUPPORT (OUTPATIENT)
Dept: OBSTETRICS AND GYNECOLOGY | Facility: CLINIC | Age: 29
End: 2024-10-17
Payer: MEDICAID

## 2024-10-17 VITALS — DIASTOLIC BLOOD PRESSURE: 82 MMHG | WEIGHT: 250.63 LBS | BODY MASS INDEX: 41.7 KG/M2 | SYSTOLIC BLOOD PRESSURE: 130 MMHG

## 2024-10-17 VITALS — RESPIRATION RATE: 18 BRPM | DIASTOLIC BLOOD PRESSURE: 86 MMHG | SYSTOLIC BLOOD PRESSURE: 135 MMHG | HEART RATE: 93 BPM

## 2024-10-17 DIAGNOSIS — Z14.8 CARRIER OF SPINAL MUSCULAR ATROPHY: ICD-10-CM

## 2024-10-17 DIAGNOSIS — I10 CHRONIC HYPERTENSION: Primary | ICD-10-CM

## 2024-10-17 DIAGNOSIS — Z3A.32 32 WEEKS GESTATION OF PREGNANCY: ICD-10-CM

## 2024-10-17 DIAGNOSIS — O28.8 NON-REACTIVE NST (NON-STRESS TEST): ICD-10-CM

## 2024-10-17 DIAGNOSIS — O99.210 OBESITY IN PREGNANCY, ANTEPARTUM: ICD-10-CM

## 2024-10-17 RX ORDER — ONDANSETRON 4 MG/1
8 TABLET, ORALLY DISINTEGRATING ORAL EVERY 8 HOURS PRN
Status: DISCONTINUED | OUTPATIENT
Start: 2024-10-17 | End: 2024-10-17 | Stop reason: HOSPADM

## 2024-10-17 RX ORDER — ACETAMINOPHEN 500 MG
500 TABLET ORAL EVERY 6 HOURS PRN
Status: DISCONTINUED | OUTPATIENT
Start: 2024-10-17 | End: 2024-10-17 | Stop reason: HOSPADM

## 2024-10-17 NOTE — NURSING
1015- PT SENT FROM OB CLINIC FOR 2 HOURS OF PROLONGED MONITORING, PO HYDRATION AND BPP. PT PLACED ON EFM/TOCO. BP TAKEN. PT DENIES ANY BLEEDING, LOF, OR PAIN.     1230- RADIOLOGY AT BEDSIDE FOR BPP.    1300- BPP 8/8, YOVANI 8.47. DR SHRESTHA NOTIFIED.  WITH MODERATE VARIABILIY WITH ACCELS. NO DECELS NOTED. OKAY TO DISCHARGE HOME WITH LABOR PRECAUTIONS. KEEP SCHEDULED APPT.

## 2024-10-22 ENCOUNTER — CLINICAL SUPPORT (OUTPATIENT)
Dept: OBSTETRICS AND GYNECOLOGY | Facility: CLINIC | Age: 29
End: 2024-10-22
Payer: MEDICAID

## 2024-10-22 VITALS
SYSTOLIC BLOOD PRESSURE: 130 MMHG | DIASTOLIC BLOOD PRESSURE: 86 MMHG | WEIGHT: 254.19 LBS | HEIGHT: 65 IN | BODY MASS INDEX: 42.35 KG/M2 | TEMPERATURE: 97 F

## 2024-10-22 DIAGNOSIS — Z3A.33 33 WEEKS GESTATION OF PREGNANCY: ICD-10-CM

## 2024-10-22 DIAGNOSIS — I10 CHRONIC HYPERTENSION: ICD-10-CM

## 2024-10-22 DIAGNOSIS — Z14.8 CARRIER OF SPINAL MUSCULAR ATROPHY: ICD-10-CM

## 2024-10-22 DIAGNOSIS — Z23 NEED FOR INFLUENZA VACCINATION: Primary | ICD-10-CM

## 2024-10-22 DIAGNOSIS — O99.210 OBESITY IN PREGNANCY, ANTEPARTUM: ICD-10-CM

## 2024-10-22 NOTE — PROGRESS NOTES
"Chief Complaint:  Routine Prenatal Visit    History of Present Illness:  Tony Ruggiero is a 29 y.o. year old  at 33w2d presents for her ob exam. She is doing well. +fetal movement. Denies vaginal bleeding, vaginal discharge, LOF or contractions. Negative preeclampsia ROS.        Review of Systems:  General/Constitutional: Fever denies. Headache denies.    Skin: Rash denies.   Respiratory: Cough denies. SOB denies. Wheezing denies .   Cardiovascular: Chest pain denies. Dizziness denies. Palpitations denies. Swelling in hands/feet denies.    Gastrointestinal: Abdominal pain denies. Constipation denies. Diarrhea denies. Heartburn denies. Nausea denies. Vomiting denies.    Genitourinary: Painful urination denies.   Gynecologic: Vaginal bleeding denies. Vaginal discharge denies. Leaking amniotic fluid denies. Contractions denies.    Psychiatric: Depressed mood denies. Suicidal thoughts denies.       Current Outpatient Medications:     aspirin 81 MG Chew, Take 81 mg by mouth once daily., Disp: , Rfl:     PNV no.153/FA/om3/dha/epa/fish (PRENATAL GUMMIES ORAL), Take 2 Units by mouth Daily., Disp: , Rfl:     prochlorperazine (COMPAZINE) 5 MG tablet, Take 1 tablet (5 mg total) by mouth every 6 (six) hours as needed (headaches)., Disp: 30 tablet, Rfl: 0  No current facility-administered medications for this visit.      Physical Exam:  /86   Temp 96.6 °F (35.9 °C)   Ht 5' 5" (1.651 m)   Wt 115.3 kg (254 lb 3.2 oz)   LMP 2024   BMI 42.30 kg/m²     Vitals:    10/22/24 0800 10/22/24 0843 10/22/24 1100   BP: (!) 150/96 132/84 130/86   BP Location: Right arm     Patient Position: Lying     Temp: 96.6 °F (35.9 °C)     Weight: 115.3 kg (254 lb 3.2 oz)     Height: 5' 5" (1.651 m)         Constitutional: General appearance: healthy, well-nourished and well-developed   Psychiatric:  Orientation to time, place and person. Normal mood and affect and active, alert   Abdomen: Auscultation/Inspection/Palpation: " Gravid. No tenderness or masses. Soft, nondistended   Extremities: no CCE    NST:   - Baseline: 130s-140s  - Variability: moderate  - Accelerations: ABSENT   - Decelerations: ABSENT  - Time on monitor: 20 minutes  - Category: .reactive, category 1    [place]:   - CTX: ABSENT    Biophysical Profile:  2/2 - Breathing  2/2 - Tone  2/2 - Movement  2/2 - Fluid    Score: 8/8        Assessment/Plan  1. Chronic hypertension  Reviewed MFM note w/ pt    Vitals:    10/22/24 0800 10/22/24 0843 10/22/24 1100   BP: (!) 150/96 132/84 130/86        Pre-existing hypertension in pregnancy is associated with adverse fetal outcomes, including growth restriction, stillbirth,  delivery, and pregnancy risk for mom and baby to include abruption and superimposed preeclampsia.      Growth scans starting at 28 weeks and then every 3 to 4 weeks   Last growth , keep follow MFM appt  Weekly testing  BPP 10/14 was 8/     Pt self discontinued Labetalol 200 mg due to BP dropping to 100/60s and feeling lightheaded.  No medications        EKG 24  Vent. Rate : 106 BPM     Atrial Rate : 106 BPM      P-R Int : 150 ms          QRS Dur : 078 ms       QT Int : 348 ms       P-R-T Axes : 071 022 023 degrees      QTc Int : 462 ms     Sinus tachycardia   Nonspecific T wave abnormality   Abnormal ECG   No previous ECGs available   Confirmed by Ryan Salinas MD ([id number]) on 2024 10:41:15 AM      CIS consult  -   - Note reviewed with pt     (10/5) ALT/AST: 15/  () ALT/AST: 32/33  () 24hr urine protein: unable to calculate due to low protein result      The [place] preventative task force placed the following recommendations in 2016 which ACOG supports regarding utilization of low dose 81mg ASA starting at 14 weeks and continuing through pregnancy in high risk pregnant women. The utilization of this has been shown to reduce the risk for preeclampsia by 24% in clinical trials and reduce the risk of  birth by 14% and IUGR about 20%.       Cont 81mg ASA      2. Obesity in pregnancy, antepartum  Patient educated on risks and complications of obesity and pregnancy including but not limited to first trimester miscarriage recurrent miscarriages, congenital anomalies, HTN disorders, gestational diabetes, macrosomia, PTL&D, higher risk of fetal demise in-utero and higher risk of CS (and wound complication including infection breakdown) with obesity.     3. Carrier of spinal muscular atrophy  Educated, discussed results  Partner testing negative     4. 33 weeks gestation of pregnancy  Discussed that should any of the following symptoms occur during pregnancy, patient should contact the office immediately or go to the ER after business hours: spotting or bleeding, cramping, painful urination, severe vomiting, pain in one leg or calf, sudden gush of fluid, decrease or absence of fetal movement, sudden weight gain, periorbital or facial swelling, headache with visual changes and/or photophobia.       Discussed with patient travel precautions.  Follow up 1 week for NST      Educated on compliance of future appts  Future Appointments   Date Time Provider Department Center   10/28/2024  8:00 AM Ilene Velez MD [facility]    11/4/2024  2:20 [facility]   11/4/2024  3:00 PM [facility]       This note was transcribed by Gladys Ford MA. There may be transcription errors as a result, however minimal. I agree with transcription and every effort has been made to ensure accuracy of transcription, but any obvious errors or omissions should be clarified with the author of the document.

## 2024-10-28 ENCOUNTER — CLINICAL SUPPORT (OUTPATIENT)
Dept: OBSTETRICS AND GYNECOLOGY | Facility: CLINIC | Age: 29
End: 2024-10-28
Payer: MEDICAID

## 2024-10-28 VITALS
TEMPERATURE: 97 F | DIASTOLIC BLOOD PRESSURE: 82 MMHG | WEIGHT: 254 LBS | HEIGHT: 65 IN | SYSTOLIC BLOOD PRESSURE: 122 MMHG | BODY MASS INDEX: 42.32 KG/M2

## 2024-10-28 DIAGNOSIS — Z14.8 CARRIER OF SPINAL MUSCULAR ATROPHY: ICD-10-CM

## 2024-10-28 DIAGNOSIS — Z3A.34 34 WEEKS GESTATION OF PREGNANCY: ICD-10-CM

## 2024-10-28 DIAGNOSIS — I10 CHRONIC HYPERTENSION: Primary | ICD-10-CM

## 2024-10-28 DIAGNOSIS — O99.210 OBESITY IN PREGNANCY, ANTEPARTUM: ICD-10-CM

## 2024-10-31 DIAGNOSIS — Z14.8 CARRIER OF SPINAL MUSCULAR ATROPHY: ICD-10-CM

## 2024-10-31 DIAGNOSIS — O10.919 CHRONIC HYPERTENSION AFFECTING PREGNANCY: Primary | ICD-10-CM

## 2024-11-03 ENCOUNTER — PATIENT MESSAGE (OUTPATIENT)
Dept: OTHER | Facility: OTHER | Age: 29
End: 2024-11-03
Payer: MEDICAID

## 2024-11-04 ENCOUNTER — OFFICE VISIT (OUTPATIENT)
Dept: MATERNAL FETAL MEDICINE | Facility: CLINIC | Age: 29
End: 2024-11-04
Payer: MEDICAID

## 2024-11-04 ENCOUNTER — PROCEDURE VISIT (OUTPATIENT)
Dept: MATERNAL FETAL MEDICINE | Facility: CLINIC | Age: 29
End: 2024-11-04
Payer: MEDICAID

## 2024-11-04 VITALS
HEIGHT: 65 IN | DIASTOLIC BLOOD PRESSURE: 85 MMHG | HEART RATE: 116 BPM | WEIGHT: 253.75 LBS | SYSTOLIC BLOOD PRESSURE: 138 MMHG | BODY MASS INDEX: 42.28 KG/M2

## 2024-11-04 DIAGNOSIS — O99.213 OBESITY AFFECTING PREGNANCY IN THIRD TRIMESTER, UNSPECIFIED OBESITY TYPE: ICD-10-CM

## 2024-11-04 DIAGNOSIS — Z14.8 CARRIER OF SPINAL MUSCULAR ATROPHY: ICD-10-CM

## 2024-11-04 DIAGNOSIS — O10.919 CHRONIC HYPERTENSION AFFECTING PREGNANCY: ICD-10-CM

## 2024-11-04 DIAGNOSIS — O28.9 ABNORMAL FINDINGS ON PRENATAL SCREENING: ICD-10-CM

## 2024-11-04 DIAGNOSIS — O10.919 CHRONIC HYPERTENSION AFFECTING PREGNANCY: Primary | ICD-10-CM

## 2024-11-04 PROCEDURE — 99213 OFFICE O/P EST LOW 20 MIN: CPT | Mod: TH,S$GLB,, | Performed by: OBSTETRICS & GYNECOLOGY

## 2024-11-04 PROCEDURE — 3075F SYST BP GE 130 - 139MM HG: CPT | Mod: CPTII,S$GLB,, | Performed by: OBSTETRICS & GYNECOLOGY

## 2024-11-04 PROCEDURE — 3008F BODY MASS INDEX DOCD: CPT | Mod: CPTII,S$GLB,, | Performed by: OBSTETRICS & GYNECOLOGY

## 2024-11-04 PROCEDURE — 76819 FETAL BIOPHYS PROFIL W/O NST: CPT | Mod: S$GLB,,, | Performed by: OBSTETRICS & GYNECOLOGY

## 2024-11-04 PROCEDURE — 76816 OB US FOLLOW-UP PER FETUS: CPT | Mod: S$GLB,,, | Performed by: OBSTETRICS & GYNECOLOGY

## 2024-11-04 PROCEDURE — 1160F RVW MEDS BY RX/DR IN RCRD: CPT | Mod: CPTII,S$GLB,, | Performed by: OBSTETRICS & GYNECOLOGY

## 2024-11-04 PROCEDURE — 1159F MED LIST DOCD IN RCRD: CPT | Mod: CPTII,S$GLB,, | Performed by: OBSTETRICS & GYNECOLOGY

## 2024-11-04 PROCEDURE — 3079F DIAST BP 80-89 MM HG: CPT | Mod: CPTII,S$GLB,, | Performed by: OBSTETRICS & GYNECOLOGY

## 2024-11-04 NOTE — ASSESSMENT & PLAN NOTE
Previously counseled the patient on maternal/fetal risks associated with CHTN during pregnancy. Risks include but not limited to fetal growth restriction, miscarriage, abruption, maternal end organ disease (renal failure, MI, and stroke),  delivery, development of superimposed preeclampsia, and eclampsia. She was counseled on the recommendations for blood pressure control, serial ultrasound for fetal growth assessment and  testing, and timing of delivery. I also counseled her on the recommendation for aspirin 81 mg daily which may decrease her risk of developing superimposed preeclampsia.     10/3/24- Initial /87. Repeat /89. Previously, we Rx'ed Labetalol 200mg BID. She states she only took the medication one time and d/c'ed because it made her feel 'funny' and her BP dropped to 100s/60s, per her report. BP at primary OB's office two days ago 138/80.    24- Doing well. BP normal. No meds. See delivery recs.     Recommendations (Please refer to [facility] guidelines):  -Continue aspirin 81 mg daily for preeclampsia risk reduction  -No meds currently.   -Baseline evaluation with primary OB:   24-hour urine protein or baseline P/C ratio, CMP, and CBC (completed)  Maternal EKG  Maternal ophthalmic evaluation  Maternal echocardiogram if HTN has been long-standing or EKG is abnormal  -Serial fetal growth ultrasounds every 4-6 weeks, beginning at 26-28 weeks.   -Continued close observation of patient's blood pressures. Avoid hypotension as this has been associated with uteroplacental insufficiency.  -In conjunction with the CHAP study recommendation for BP control: If BP is persistently >=140/90 antihypertensive medication is recommended with goal BP of 120-140/80-90.  -Weekly antepartum testing at 32 weeks (NST+AFV); twice weekly testing if control is poor, multiple comorbidities are present, or requires several medications for control   -Delivery timing:  No medications, no comorbid  conditions: 39 0/7 - 39 6/7 weeks gestation  No medications, comorbid conditions: 38 0/7 - 38 6/7 weeks gestation  Controlled on single agent, no comorbid conditions: 38 0/7 - 38 6/7 weeks gestation  Controlled on single agent, comorbid conditions: 37 0/7 - 38 6/7 weeks gestation  Uncontrolled or requiring >= 2 medications: 36 0/7 - 37 6/7 weeks gestation    Comorbid conditions include BMI >= 40, diabetes, and complex medical condition associated with placental dysfunction (ie lupus or other vascular disease)  Delivery may be recommended earlier pending results of fetal growth ultrasounds, AFV assessment, or antepartum testing results.

## 2024-11-04 NOTE — ASSESSMENT & PLAN NOTE
Please see prior documentation for specific counseling.      BMI 42    Recommendations:  TWG goal is 11-20 lbs  Screen for signs/symptoms of obstructive sleep apnea  Nutritionist consult offered (this is to be ordered by primary OB provider)  Continue low dose aspirin 81 mg daily for preeclampsia risk reduction  Targeted anatomical survey scheduled at 18-20 weeks (completed)  Fetal growth ultrasound at 32 weeks if BMI >= 40  Weekly  testing at 32 weeks if pre-pregnancy BMI >= 45  Lovenox 40 mg BID for VTE prophylaxis while admitted to the hospital (antepartum or postpartum) if BMI >= 40.   Encouraged breastfeeding  Postpartum lifestyle modifications & weight loss

## 2024-11-04 NOTE — ASSESSMENT & PLAN NOTE
With routine prenatal labs, genetic carrier testing (NIPT through [facility]) was performed. 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 Prior to this pregnancy, she was unaware of any genetic conditions she may carry. The father of the baby had carrier testing drawn this morning with pending results.    We have discussed this motor neuron disease, briefly, with her. There are four different types of SMA with varying degrees of motor function. It is an autosomal recessive condition. We have discussed this particular type of inheritance pattern. 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 If her partner is a carrier, the risk of passing SMA would be her risk of 1 in 34 plus the 25% chance they would both pass a mutated copy.    9/4-FOB testing was negative.  The risk of SMA is significantly decreased based on this result.

## 2024-11-07 NOTE — H&P
Chief Complaint:  Routine Prenatal Visit    History of Present Illness:  Tony Ruggiero is a 29 y.o. year old  at 36w1d presents for preadmit ob. She is doing well. +fetal movement. Denies vaginal bleeding, vaginal discharge, LOF or contractions. Negative preeclampsia ROS.        Review of Systems:  General/Constitutional: Fever denies .    Skin: Rash denies.   Respiratory: Cough denies. SOB denies. Wheezing denies .   Cardiovascular: Chest pain denies. Dizziness denies. Palpitations denies. Swelling in hands/feet denies    Gastrointestinal: Abdominal pain denies. Constipation denies. Diarrhea denies. Heartburn denies. Nausea denies. Vomiting denies    Genitourinary: Painful urination denies.   Gynecologic: Vaginal bleeding denies. Vaginal discharge Normal. Leaking amniotic fluid denies. Contractions denies    Psychiatric: Depressed mood denies. Suicidal thoughts denies.     OB History    Para Term  AB Living   4 3 3 0 0 3   SAB IAB Ectopic Multiple Live Births   0 0 0 0 3      # 1 - Date: 12/10/15, Sex: Female, Weight: 2.92 kg (6 lb 7 oz), GA: 37w6d, Type: Vaginal, Spontaneous, Apgar1: None, Apgar5: None, Living: Living, Birth Comments: None    # 2 - Date: 20, Sex: Female, Weight: 2.778 kg (6 lb 2 oz), GA: 37w6d, Type: Vaginal, Spontaneous, Apgar1: None, Apgar5: None, Living: Living, Birth Comments: None    # 3 - Date: 05/10/22, Sex: Male, Weight: 3.062 kg (6 lb 12 oz), GA: 37w5d, Type: Vaginal, Spontaneous, Apgar1: None, Apgar5: None, Living: Living, Birth Comments: None    # 4 - Date: None, Sex: None, Weight: None, GA: None, Type: None, Apgar1: None, Apgar5: None, Living: None, Birth Comments: None      Past Medical History:   Diagnosis Date    Gestational hypertension        Current Outpatient Medications:     aspirin 81 MG Chew, Take 81 mg by mouth once daily., Disp: , Rfl:     PNV no.153/FA/om3/dha/epa/fish (PRENATAL GUMMIES ORAL), Take 2 Units by mouth Daily., Disp: , Rfl:      prochlorperazine (COMPAZINE) 5 MG tablet, Take 1 tablet (5 mg total) by mouth every 6 (six) hours as needed (headaches)., Disp: 30 tablet, Rfl: 0    Review of patient's allergies indicates:  No Known Allergies    Past Surgical History:   Procedure Laterality Date    LAPAROSCOPIC CHOLECYSTECTOMY N/A 04/13/2023    Procedure: CHOLECYSTECTOMY, LAPAROSCOPIC;  Surgeon: Екатерина Krishna MD;  Location: [facility];  Service: General;  Laterality: N/A;     Family History   Problem Relation Name Age of Onset    Hypertension Mother Betty new     Breast cancer Neg Hx      Ovarian cancer Neg Hx      Colon cancer Neg Hx      Uterine cancer Neg Hx      Cervical cancer Neg Hx       Social History     Socioeconomic History    Marital status: Single     Spouse name: José Miguel Foy   Occupational History    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   Tobacco Use    Smoking status: Never     Passive exposure: Never    Smokeless tobacco: Never   Substance and Sexual Activity    Alcohol use: Never    Drug use: Never    Sexual activity: Yes     Partners: Male     Birth control/protection: None     Comment: STI: Chlamydia   Social History Narrative    ** Merged History Encounter **          Social Drivers of Health     Financial Resource Strain: Low Risk  (4/13/2023)    Overall Financial Resource Strain (CARDIA)     Difficulty of Paying Living Expenses: Not hard at all   Food Insecurity: No Food Insecurity (4/13/2023)    Hunger Vital Sign     Worried About Running Out of Food in the Last Year: Never true     Ran Out of Food in the Last Year: Never true   Transportation Needs: No Transportation Needs (4/13/2023)    PRAPARE - Transportation     Lack of Transportation (Medical): No     Lack of Transportation (Non-Medical): No   Physical Activity: Inactive (4/13/2023)    Exercise Vital Sign     Days of Exercise per Week: 0 days     Minutes of Exercise per Session: 0 min   Stress: No Stress Concern Present (4/13/2023)    [employer] [place] [employer] -  "Occupational Stress Questionnaire     Feeling of Stress : Not at all   Housing Stability: Low Risk  (4/13/2023)    Housing Stability Vital Sign     Unable to Pay for Housing in the Last Year: No     Number of Places Lived in the Last Year: 1     Unstable Housing in the Last Year: No       Physical Exam:  /84 (BP Location: Right arm, Patient Position: Sitting)   Temp 97.3 °F (36.3 °C)   Ht 5' 5" (1.651 m)   Wt 116.9 kg (257 lb 12.8 oz)   LMP 03/03/2024   BMI 42.90 kg/m²     Constitutional: General appearance: healthy, well-nourished and well-developed   Psychiatric:  Orientation to time, place and person. Normal mood and affect and active, alert  Cardiovascular: RRR, no murmurs, gallops or rub  Respiratory: clear to auscultate bilaterally, no wheezes, rales, or rhonchi  Abdomen: Auscultation/Inspection/Palpation: No tenderness or masses. Soft, nondistended   Extremities: no CCE      NST:   - Baseline: 140s  - Variability: moderate  - Accelerations: PRESENT: 15X15   - Decelerations: ABSENT  - Time on monitor: 20 minutes  - Category: .reactive, category 1    [place]:   - CTX: ABSENT        ABO/Rh:   Lab Results   Component Value Date/Time    ABORH A POS 05/21/2024 12:13 PM    ABSCREEN NEG 05/21/2024 12:13 PM     H&H:  Lab Results   Component Value Date/Time    HGB 10.9 (L) 10/05/2024 03:55 PM    HCT 33.2 (L) 10/05/2024 03:55 PM     Platelet:  Lab Results   Component Value Date/Time     (H) 10/05/2024 03:55 PM     [place]:   Lab Results   Component Value Date/Time    SZWF1AV 87 (L) 08/21/2024 01:29 PM     HIV:   Lab Results   Component Value Date/Time    HIV Nonreactive 05/21/2024 12:13 PM     RPR:  Lab Results   Component Value Date/Time    SYPHAB Nonreactive 09/30/2024 03:30 PM     Hepatitis B Surface Antigen:  Lab Results   Component Value Date/Time    HEPBSAG Negative 05/21/2024 12:13 PM     Hepatitis C:  Lab Results   Component Value Date/Time    HEPCAB Nonreactive 05/21/2024 12:13 PM     Rubella " Immune Status:  Lab Results   Component Value Date/Time    RUBELLAIGG 15.20 2024 12:13 PM     Chlamydia:  Lab Results   Component Value Date/Time    CTRNA Negative 2024 11:42 AM     Gonorrhea:  Lab Results   Component Value Date/Time    FACVSPECIMEN Negative 2024 11:42 AM      Trichomoniasis:  Lab Results   Component Value Date/Time    TRVGRNA Negative 2024 11:42 AM      Last PAP Date: 10/31/2023      Assessment/Plan:  1. Chronic hypertension  Pre-existing hypertension in pregnancy is associated with adverse fetal outcomes, including growth restriction, stillbirth,  delivery, and pregnancy risk for mom and baby to include abruption and superimposed preeclampsia.      Growth scans starting at 28 weeks and then every 3 to 4 weeks   Last growth , no further MFM visits  Weekly testing     Pt self discontinued Labetalol 200 mg due to BP dropping to 100/60s and feeling lightheaded.  No medications      (10/5) ALT/AST: 15/23  () ALT/AST: 32  () 24hr urine protein: unable to calculate due to low protein result      The [place] preventative task force placed the following recommendations in 2016 which ACOG supports regarding utilization of low dose 81mg ASA starting at 14 weeks and continuing through pregnancy in high risk pregnant women. The utilization of this has been shown to reduce the risk for preeclampsia by 24% in clinical trials and reduce the risk of  birth by 14% and IUGR about 20%.      Cont 81mg ASA      2. Obesity in pregnancy, antepartum  Patient educated on risks and complications of obesity and pregnancy including but not limited to first trimester miscarriage recurrent miscarriages, congenital anomalies, HTN disorders, gestational diabetes, macrosomia, PTL&D, higher risk of fetal demise in-utero and higher risk of CS (and wound complication including infection breakdown) with obesity.     3. Carrier of spinal muscular atrophy  Educated, discussed  results  Partner testing negative     4. 36 weeks gestation of pregnancy   Discussed that should any of the following symptoms occur during pregnancy, patient should contact the office immediately or go to the ER after business hours: spotting or bleeding, cramping, painful urination, severe vomiting, pain in one leg or calf, sudden gush of fluid, decrease or absence of fetal movement, sudden weight gain, periorbital or facial swelling, headache with visual changes and/or photophobia.       Discussed with patient travel precautions.       Discussed risks and complications of vaginal delivery and . Reviewed delivery process including induction, c/s and anesthesia. Explained preadmit process. Gave labor precautions and advised to go to OB unit if rupture of membranes occurs, pt. experiences bleeding, contractions get closer than five minutes apart, or pt. notices lack of or decrease in fetal movement. Pt. voices understanding and gives informed consent.       GBS and gc/cz/tv collected  Desires 38wk induction, 24 @0400  Follow up 1 week      Future Appointments   Date Time Provider Department Center   2024  4:00 AM INDUCTION, [facility]         This note was transcribed by Gladys Ford MA. There may be transcription errors as a result, however minimal. I agree with transcription and every effort has been made to ensure accuracy of transcription, but any obvious errors or omissions should be clarified with the author of the document.

## 2024-11-11 ENCOUNTER — CLINICAL SUPPORT (OUTPATIENT)
Dept: OBSTETRICS AND GYNECOLOGY | Facility: CLINIC | Age: 29
End: 2024-11-11
Payer: MEDICAID

## 2024-11-11 VITALS
WEIGHT: 257.81 LBS | SYSTOLIC BLOOD PRESSURE: 136 MMHG | DIASTOLIC BLOOD PRESSURE: 84 MMHG | TEMPERATURE: 97 F | HEIGHT: 65 IN | BODY MASS INDEX: 42.95 KG/M2

## 2024-11-11 DIAGNOSIS — I10 CHRONIC HYPERTENSION: Primary | ICD-10-CM

## 2024-11-11 DIAGNOSIS — Z14.8 CARRIER OF SPINAL MUSCULAR ATROPHY: ICD-10-CM

## 2024-11-11 DIAGNOSIS — O99.210 OBESITY IN PREGNANCY, ANTEPARTUM: ICD-10-CM

## 2024-11-11 DIAGNOSIS — Z3A.36 36 WEEKS GESTATION OF PREGNANCY: ICD-10-CM

## 2024-11-11 PROCEDURE — 87653 STREP B DNA AMP PROBE: CPT | Performed by: OBSTETRICS & GYNECOLOGY

## 2024-11-11 PROCEDURE — 87591 N.GONORRHOEAE DNA AMP PROB: CPT | Performed by: OBSTETRICS & GYNECOLOGY

## 2024-11-11 PROCEDURE — 87661 TRICHOMONAS VAGINALIS AMPLIF: CPT | Performed by: OBSTETRICS & GYNECOLOGY

## 2024-11-11 RX ORDER — TRANEXAMIC ACID 10 MG/ML
1000 INJECTION, SOLUTION INTRAVENOUS EVERY 30 MIN PRN
OUTPATIENT
Start: 2024-11-11

## 2024-11-11 RX ORDER — OXYTOCIN 10 [USP'U]/ML
10 INJECTION, SOLUTION INTRAMUSCULAR; INTRAVENOUS ONCE AS NEEDED
OUTPATIENT
Start: 2024-11-11 | End: 2036-04-09

## 2024-11-11 RX ORDER — ACETAMINOPHEN 325 MG/1
650 TABLET ORAL EVERY 6 HOURS PRN
OUTPATIENT
Start: 2024-11-11

## 2024-11-11 RX ORDER — MUPIROCIN 20 MG/G
OINTMENT TOPICAL
OUTPATIENT
Start: 2024-11-11

## 2024-11-11 RX ORDER — ONDANSETRON 4 MG/1
8 TABLET, ORALLY DISINTEGRATING ORAL EVERY 8 HOURS PRN
OUTPATIENT
Start: 2024-11-11

## 2024-11-11 RX ORDER — MISOPROSTOL 100 MCG
25 TABLET ORAL EVERY 4 HOURS PRN
OUTPATIENT
Start: 2024-11-11

## 2024-11-11 RX ORDER — DIPHENOXYLATE HYDROCHLORIDE AND ATROPINE SULFATE 2.5; .025 MG/1; MG/1
2 TABLET ORAL EVERY 6 HOURS PRN
OUTPATIENT
Start: 2024-11-11

## 2024-11-11 RX ORDER — MISOPROSTOL 100 UG/1
800 TABLET ORAL ONCE AS NEEDED
OUTPATIENT
Start: 2024-11-11

## 2024-11-11 RX ORDER — OXYTOCIN/0.9 % SODIUM CHLORIDE 15/250 ML
0-32 PLASTIC BAG, INJECTION (ML) INTRAVENOUS CONTINUOUS
OUTPATIENT
Start: 2024-11-11

## 2024-11-11 RX ORDER — OXYTOCIN-SODIUM CHLORIDE 0.9% IV SOLN 30 UNIT/500ML 30-0.9/5 UT/ML-%
10 SOLUTION INTRAVENOUS ONCE
OUTPATIENT
Start: 2024-11-11 | End: 2024-11-11

## 2024-11-11 RX ORDER — SODIUM CHLORIDE, SODIUM LACTATE, POTASSIUM CHLORIDE, CALCIUM CHLORIDE 600; 310; 30; 20 MG/100ML; MG/100ML; MG/100ML; MG/100ML
INJECTION, SOLUTION INTRAVENOUS CONTINUOUS
OUTPATIENT
Start: 2024-11-11

## 2024-11-11 RX ORDER — CARBOPROST TROMETHAMINE 250 UG/ML
250 INJECTION, SOLUTION INTRAMUSCULAR
OUTPATIENT
Start: 2024-11-11

## 2024-11-11 RX ORDER — CEFAZOLIN SODIUM 2 G/50ML
2 SOLUTION INTRAVENOUS ONCE AS NEEDED
OUTPATIENT
Start: 2024-11-11 | End: 2036-04-09

## 2024-11-11 RX ORDER — PROCHLORPERAZINE EDISYLATE 5 MG/ML
5 INJECTION INTRAMUSCULAR; INTRAVENOUS EVERY 6 HOURS PRN
OUTPATIENT
Start: 2024-11-11

## 2024-11-11 RX ORDER — OXYTOCIN/0.9 % SODIUM CHLORIDE 15/250 ML
95 PLASTIC BAG, INJECTION (ML) INTRAVENOUS ONCE
OUTPATIENT
Start: 2024-11-11 | End: 2024-11-11

## 2024-11-11 RX ORDER — METHYLERGONOVINE MALEATE 0.2 MG/ML
200 INJECTION INTRAVENOUS ONCE AS NEEDED
OUTPATIENT
Start: 2024-11-11 | End: 2036-04-09

## 2024-11-11 RX ORDER — TERBUTALINE SULFATE 1 MG/ML
0.25 INJECTION SUBCUTANEOUS
OUTPATIENT
Start: 2024-11-11

## 2024-11-11 RX ORDER — SODIUM CHLORIDE 9 MG/ML
INJECTION, SOLUTION INTRAVENOUS
OUTPATIENT
Start: 2024-11-11

## 2024-11-11 RX ORDER — PENICILLIN G 3000000 [IU]/50ML
3 INJECTION, SOLUTION INTRAVENOUS
OUTPATIENT
Start: 2024-11-11

## 2024-11-11 RX ORDER — BUTORPHANOL TARTRATE 1 MG/ML
1 INJECTION INTRAMUSCULAR; INTRAVENOUS
OUTPATIENT
Start: 2024-11-11

## 2024-11-11 RX ORDER — OXYTOCIN/0.9 % SODIUM CHLORIDE 15/250 ML
95 PLASTIC BAG, INJECTION (ML) INTRAVENOUS ONCE AS NEEDED
OUTPATIENT
Start: 2024-11-11 | End: 2036-04-09

## 2024-11-11 RX ORDER — SIMETHICONE 80 MG
1 TABLET,CHEWABLE ORAL 4 TIMES DAILY PRN
OUTPATIENT
Start: 2024-11-11

## 2024-11-11 RX ORDER — BUTORPHANOL TARTRATE 2 MG/ML
2 INJECTION INTRAMUSCULAR; INTRAVENOUS
OUTPATIENT
Start: 2024-11-11

## 2024-11-11 RX ORDER — OXYTOCIN-SODIUM CHLORIDE 0.9% IV SOLN 30 UNIT/500ML 30-0.9/5 UT/ML-%
30 SOLUTION INTRAVENOUS ONCE AS NEEDED
OUTPATIENT
Start: 2024-11-11 | End: 2036-04-09

## 2024-11-11 RX ORDER — MISOPROSTOL 100 UG/1
800 TABLET ORAL ONCE AS NEEDED
OUTPATIENT
Start: 2024-11-11 | End: 2036-04-09

## 2024-11-11 RX ORDER — LIDOCAINE HYDROCHLORIDE 10 MG/ML
10 INJECTION, SOLUTION INFILTRATION; PERINEURAL ONCE AS NEEDED
OUTPATIENT
Start: 2024-11-11 | End: 2036-04-09

## 2024-11-11 RX ORDER — CALCIUM CARBONATE 200(500)MG
500 TABLET,CHEWABLE ORAL 3 TIMES DAILY PRN
OUTPATIENT
Start: 2024-11-11

## 2024-11-12 LAB
C TRACH RRNA SPEC QL NAA+PROBE: NEGATIVE
N GONORRHOEA RRNA SPEC QL NAA+PROBE: NEGATIVE
SPECIMEN SOURCE: NORMAL
STREP B PCR (OHS): NOT DETECTED
T VAGINALIS RRNA SPEC QL NAA+PROBE: NEGATIVE

## 2024-11-13 ENCOUNTER — HOSPITAL ENCOUNTER (OUTPATIENT)
Dept: PREADMISSION TESTING | Facility: HOSPITAL | Age: 29
Discharge: HOME | End: 2024-11-13
Payer: MEDICAID

## 2024-11-13 DIAGNOSIS — O99.210 OBESITY IN PREGNANCY, ANTEPARTUM: ICD-10-CM

## 2024-11-13 DIAGNOSIS — I10 CHRONIC HYPERTENSION: ICD-10-CM

## 2024-11-13 LAB
BASOPHILS # BLD AUTO: 0.03 X10(3)/MCL (ref 0.01–0.08)
BASOPHILS NFR BLD AUTO: 0.3 % (ref 0.1–1.2)
EOSINOPHIL # BLD AUTO: 0.16 X10(3)/MCL (ref 0.04–0.36)
EOSINOPHIL NFR BLD AUTO: 1.4 % (ref 0.7–7)
ERYTHROCYTE [DISTWIDTH] IN BLOOD BY AUTOMATED COUNT: 15 % (ref 11–14.5)
HBV SURFACE AG SERPL QL IA: NEGATIVE
HBV SURFACE AG SERPL QL IA: NORMAL
HCT VFR BLD AUTO: 33.1 % (ref 36–48)
HGB BLD-MCNC: 11 G/DL (ref 11.8–16)
HIV 1+2 AB+HIV1 P24 AG SERPL QL IA: NONREACTIVE
IMM GRANULOCYTES # BLD AUTO: 0.15 X10(3)/MCL (ref 0–0.03)
IMM GRANULOCYTES NFR BLD AUTO: 1.3 % (ref 0–0.5)
LYMPHOCYTES # BLD AUTO: 2.3 X10(3)/MCL (ref 1.16–3.74)
LYMPHOCYTES NFR BLD AUTO: 20.3 % (ref 20–55)
MCH RBC QN AUTO: 26.2 PG (ref 27–34)
MCHC RBC AUTO-ENTMCNC: 33.2 G/DL (ref 31–37)
MCV RBC AUTO: 78.8 FL (ref 79–99)
MONOCYTES # BLD AUTO: 0.71 X10(3)/MCL (ref 0.24–0.36)
MONOCYTES NFR BLD AUTO: 6.3 % (ref 4.7–12.5)
NEUTROPHILS # BLD AUTO: 7.96 X10(3)/MCL (ref 1.56–6.13)
NEUTROPHILS NFR BLD AUTO: 70.4 % (ref 37–73)
NRBC BLD AUTO-RTO: 0 %
PLATELET # BLD AUTO: 458 X10(3)/MCL (ref 140–371)
PMV BLD AUTO: 9 FL (ref 9.4–12.4)
RBC # BLD AUTO: 4.2 X10(6)/MCL (ref 4–5.1)
T PALLIDUM AB SER QL: NONREACTIVE
WBC # BLD AUTO: 11.31 X10(3)/MCL (ref 4–11.5)

## 2024-11-13 PROCEDURE — 86780 TREPONEMA PALLIDUM: CPT | Performed by: OBSTETRICS & GYNECOLOGY

## 2024-11-13 PROCEDURE — 85025 COMPLETE CBC W/AUTO DIFF WBC: CPT | Performed by: OBSTETRICS & GYNECOLOGY

## 2024-11-13 PROCEDURE — 87340 HEPATITIS B SURFACE AG IA: CPT | Performed by: OBSTETRICS & GYNECOLOGY

## 2024-11-13 PROCEDURE — 87389 HIV-1 AG W/HIV-1&-2 AB AG IA: CPT | Performed by: OBSTETRICS & GYNECOLOGY

## 2024-11-13 NOTE — DISCHARGE INSTRUCTIONS
Things to Bring to the Hospital    To help make your stay as comfortable as possible you should bring the following items when you come to the hospital:    TOOTHBRUSH  TOOTHPASTE  SHAMPOO/CONDITIONER  DEODORANT  HAIRBRUSH OR COMB  UNDERWEAR  SOAP  SANITARY NAPKINS-OVERNIGHTS (This item is optional unless you have a preference for the type you use.)  NURSING PADS  BREAST PUMP AND NURSING PILLOW    Other items needed for both mom and baby include:    Bras (2-3).  These should be ones that were worn during pregnancy or nursing bras for breastfeeding.   Gowns, slippers, and a robe.   One outfit of baby clothes to take the baby home in as well as a blanket. The hospital will provide baby clothes, blankets, diapers, and wipes during the hospital stay.   Federally approved car seat is required for the infant to go home.     OB VISITATION POLICY    The OB Department is open to family for patient visitation.  Smoking is not allowed on the premises.  Visitors must check with staff before entering a patient's room if there is a sign posted on the door or in the brown.   Visitors may be in the room with baby if they are free from infection or any signs and symptoms of illness.  All visitors must use hand  or soap and water before touching the baby.   No children under age 12 are allowed to sleep overnight or to be left unattended in room.   Our rooms only accommodate one overnight guest.  While in labor and delivery room, you will be allowed 2 support persons if you choose and one additional guest will be allowed at the discretion of the physician or nurse.   The 2-3 labor and delivery support people may be interchangeable at the discretion of the OB nurse in charge of your care.   Any other visitors will be directed to the waiting room or post-partum room until after delivery.   No one will be allowed to stay in the labor and delivery hallway.  After delivery, visitors will not be limited unless a procedure is in  progress or your conditions warrants it.    Videos and photographs are not allowed to be taken during the delivery process. They may be taken after the baby is born and declared in good condition by nursing staff or physician.   If you will be having a delivery by , you will be allowed (1) support person in operating room unless the procedure is an emergency or under general anesthesia. No video or photography is allowed until after the procedure is complete.  The support person present will be asked to leave the operating room with the baby after delivery or if any problems arise.   Family and friends should be made familiar with the visitor policy for our facility, so that we may insure that the safety and well-being of you and your baby during this very important time.

## 2024-11-15 NOTE — H&P (VIEW-ONLY)
"Chief Complaint:  Routine Prenatal Visit    History of Present Illness:  Tony Ruggiero is a 29 y.o. year old  at 37w1d presents for her ob exam and fetal testing. She is doing well. +fetal movement. Denies vaginal bleeding, vaginal discharge, LOF or contractions. Negative preeclampsia ROS.    + pelvic pressure unsure if cristian     Review of Systems:  General/Constitutional: Fever denies. Headache denies.    Skin: Rash denies.   Respiratory: Cough denies. SOB denies. Wheezing denies .   Cardiovascular: Chest pain denies. Dizziness denies. Palpitations denies. Swelling in hands/feet denies.    Gastrointestinal: Abdominal pain denies. Constipation denies. Diarrhea denies. Heartburn denies. Nausea denies. Vomiting denies.    Genitourinary: Painful urination denies.   Gynecologic: Vaginal bleeding denies. Vaginal discharge denies. Leaking amniotic fluid denies. Contractions denies.    Psychiatric: Depressed mood denies. Suicidal thoughts denies.       Current Outpatient Medications:     aspirin 81 MG Chew, Take 81 mg by mouth once daily., Disp: , Rfl:     PNV no.153/FA/om3/dha/epa/fish (PRENATAL GUMMIES ORAL), Take 2 Units by mouth Daily., Disp: , Rfl:     prochlorperazine (COMPAZINE) 5 MG tablet, Take 1 tablet (5 mg total) by mouth every 6 (six) hours as needed (headaches)., Disp: 30 tablet, Rfl: 0      Physical Exam:  /88 (BP Location: Right arm, Patient Position: Sitting)   Temp 96.8 °F (36 °C)   Ht 5' 5" (1.651 m)   Wt 117.5 kg (259 lb)   LMP 2024   BMI 43.10 kg/m²     Chaperone present    Constitutional: General appearance: healthy, well-nourished and well-developed   Psychiatric:  Orientation to time, place and person. Normal mood and affect and active, alert   Abdomen: Auscultation/Inspection/Palpation: Gravid. No tenderness or masses. Soft, nondistended   Extremities: no CCE    Cvx: /-1  NST:   - Baseline: 140s  - Variability: moderate  - Accelerations: PRESENT: 15X15   - " Decelerations: ABSENT  - Time on monitor: 20 minutes  - Category: .reactive, category 1    [place]:   - CTX: ABSENT        Assessment/Plan  LABOR/ Chronic hypertension  Pre-existing hypertension in pregnancy is associated with adverse fetal outcomes, including growth restriction, stillbirth,  delivery, and pregnancy risk for mom and baby to include abruption and superimposed preeclampsia.      Growth scans starting at 28 weeks and then every 3 to 4 weeks   Last growth , no further MFM visits  Weekly testing     Pt self discontinued Labetalol 200 mg due to BP dropping to 100/60s and feeling lightheaded.  No medications      (10/5) ALT/AST: 15/23  () ALT/AST: 32  () 24hr urine protein: unable to calculate due to low protein result      The [place] preventative task force placed the following recommendations in 2016 which ACOG supports regarding utilization of low dose 81mg ASA starting at 14 weeks and continuing through pregnancy in high risk pregnant women. The utilization of this has been shown to reduce the risk for preeclampsia by 24% in clinical trials and reduce the risk of  birth by 14% and IUGR about 20%.      Cont 81mg ASA      Pt to JEAN   2. Obesity in pregnancy, antepartum  Patient educated on risks and complications of obesity and pregnancy including but not limited to first trimester miscarriage recurrent miscarriages, congenital anomalies, HTN disorders, gestational diabetes, macrosomia, PTL&D, higher risk of fetal demise in-utero and higher risk of CS (and wound complication including infection breakdown) with obesity.     3. Carrier of spinal muscular atrophy  Educated, discussed results  Partner testing negative     4. 37 weeks gestation of pregnancy  Discussed that should any of the following symptoms occur during pregnancy, patient should contact the office immediately or go to the ER after business hours: spotting or bleeding, cramping, painful urination, severe vomiting, pain  in one leg or calf, sudden gush of fluid, decrease or absence of fetal movement, sudden weight gain, periorbital or facial swelling, headache with visual changes and/or photophobia.       Discussed with patient travel precautions.  Pt to  for induction      Educated on compliance of future appts  Future Appointments   Date Time Provider Department Center   11/25/2024  4:00 AM INDUCTION, [facility]       This note was transcribed by Gladys Ford MA. There may be transcription errors as a result, however minimal. I agree with transcription and every effort has been made to ensure accuracy of transcription, but any obvious errors or omissions should be clarified with the author of the document.

## 2024-11-15 NOTE — PROGRESS NOTES
"Chief Complaint:  Routine Prenatal Visit    History of Present Illness:  Tony Ruggiero is a 29 y.o. year old  at 37w1d presents for her ob exam and fetal testing. She is doing well. +fetal movement. Denies vaginal bleeding, vaginal discharge, LOF or contractions. Negative preeclampsia ROS.    + pelvic pressure unsure if cristian     Review of Systems:  General/Constitutional: Fever denies. Headache denies.    Skin: Rash denies.   Respiratory: Cough denies. SOB denies. Wheezing denies .   Cardiovascular: Chest pain denies. Dizziness denies. Palpitations denies. Swelling in hands/feet denies.    Gastrointestinal: Abdominal pain denies. Constipation denies. Diarrhea denies. Heartburn denies. Nausea denies. Vomiting denies.    Genitourinary: Painful urination denies.   Gynecologic: Vaginal bleeding denies. Vaginal discharge denies. Leaking amniotic fluid denies. Contractions denies.    Psychiatric: Depressed mood denies. Suicidal thoughts denies.       Current Outpatient Medications:     aspirin 81 MG Chew, Take 81 mg by mouth once daily., Disp: , Rfl:     PNV no.153/FA/om3/dha/epa/fish (PRENATAL GUMMIES ORAL), Take 2 Units by mouth Daily., Disp: , Rfl:     prochlorperazine (COMPAZINE) 5 MG tablet, Take 1 tablet (5 mg total) by mouth every 6 (six) hours as needed (headaches)., Disp: 30 tablet, Rfl: 0      Physical Exam:  /88 (BP Location: Right arm, Patient Position: Sitting)   Temp 96.8 °F (36 °C)   Ht 5' 5" (1.651 m)   Wt 117.5 kg (259 lb)   LMP 2024   BMI 43.10 kg/m²     Chaperone present    Constitutional: General appearance: healthy, well-nourished and well-developed   Psychiatric:  Orientation to time, place and person. Normal mood and affect and active, alert   Abdomen: Auscultation/Inspection/Palpation: Gravid. No tenderness or masses. Soft, nondistended   Extremities: no CCE    Cvx: /-1  NST:   - Baseline: 140s  - Variability: moderate  - Accelerations: PRESENT: 15X15   - " Decelerations: ABSENT  - Time on monitor: 20 minutes  - Category: .reactive, category 1    [place]:   - CTX: ABSENT        Assessment/Plan  LABOR/ Chronic hypertension  Pre-existing hypertension in pregnancy is associated with adverse fetal outcomes, including growth restriction, stillbirth,  delivery, and pregnancy risk for mom and baby to include abruption and superimposed preeclampsia.      Growth scans starting at 28 weeks and then every 3 to 4 weeks   Last growth , no further MFM visits  Weekly testing     Pt self discontinued Labetalol 200 mg due to BP dropping to 100/60s and feeling lightheaded.  No medications      (10/5) ALT/AST: 15/23  () ALT/AST: 32  () 24hr urine protein: unable to calculate due to low protein result      The [place] preventative task force placed the following recommendations in 2016 which ACOG supports regarding utilization of low dose 81mg ASA starting at 14 weeks and continuing through pregnancy in high risk pregnant women. The utilization of this has been shown to reduce the risk for preeclampsia by 24% in clinical trials and reduce the risk of  birth by 14% and IUGR about 20%.      Cont 81mg ASA      Pt to JEAN   2. Obesity in pregnancy, antepartum  Patient educated on risks and complications of obesity and pregnancy including but not limited to first trimester miscarriage recurrent miscarriages, congenital anomalies, HTN disorders, gestational diabetes, macrosomia, PTL&D, higher risk of fetal demise in-utero and higher risk of CS (and wound complication including infection breakdown) with obesity.     3. Carrier of spinal muscular atrophy  Educated, discussed results  Partner testing negative     4. 37 weeks gestation of pregnancy  Discussed that should any of the following symptoms occur during pregnancy, patient should contact the office immediately or go to the ER after business hours: spotting or bleeding, cramping, painful urination, severe vomiting, pain  in one leg or calf, sudden gush of fluid, decrease or absence of fetal movement, sudden weight gain, periorbital or facial swelling, headache with visual changes and/or photophobia.       Discussed with patient travel precautions.  Pt to  for induction      Educated on compliance of future appts  Future Appointments   Date Time Provider Department Center   11/25/2024  4:00 AM INDUCTION, [facility]       This note was transcribed by Gladys Ford MA. There may be transcription errors as a result, however minimal. I agree with transcription and every effort has been made to ensure accuracy of transcription, but any obvious errors or omissions should be clarified with the author of the document.

## 2024-11-18 ENCOUNTER — ANESTHESIA (OUTPATIENT)
Dept: OBSTETRICS AND GYNECOLOGY | Facility: HOSPITAL | Age: 29
End: 2024-11-18
Payer: MEDICAID

## 2024-11-18 ENCOUNTER — HOSPITAL ENCOUNTER (INPATIENT)
Facility: HOSPITAL | Age: 29
LOS: 3 days | Discharge: HOME | End: 2024-11-21
Attending: OBSTETRICS & GYNECOLOGY | Admitting: OBSTETRICS & GYNECOLOGY
Payer: MEDICAID

## 2024-11-18 ENCOUNTER — ANESTHESIA EVENT (OUTPATIENT)
Dept: OBSTETRICS AND GYNECOLOGY | Facility: HOSPITAL | Age: 29
End: 2024-11-18
Payer: MEDICAID

## 2024-11-18 ENCOUNTER — CLINICAL SUPPORT (OUTPATIENT)
Dept: OBSTETRICS AND GYNECOLOGY | Facility: CLINIC | Age: 29
End: 2024-11-18
Payer: MEDICAID

## 2024-11-18 VITALS
HEIGHT: 65 IN | TEMPERATURE: 97 F | SYSTOLIC BLOOD PRESSURE: 128 MMHG | WEIGHT: 259 LBS | DIASTOLIC BLOOD PRESSURE: 88 MMHG | BODY MASS INDEX: 43.15 KG/M2

## 2024-11-18 DIAGNOSIS — Z14.8 CARRIER OF SPINAL MUSCULAR ATROPHY: ICD-10-CM

## 2024-11-18 DIAGNOSIS — I10 CHRONIC HYPERTENSION: Primary | ICD-10-CM

## 2024-11-18 DIAGNOSIS — O99.210 OBESITY IN PREGNANCY, ANTEPARTUM: ICD-10-CM

## 2024-11-18 DIAGNOSIS — I10 CHRONIC HYPERTENSION: ICD-10-CM

## 2024-11-18 DIAGNOSIS — Z3A.37 37 WEEKS GESTATION OF PREGNANCY: ICD-10-CM

## 2024-11-18 DIAGNOSIS — Z3A.37 37 WEEKS GESTATION OF PREGNANCY: Primary | ICD-10-CM

## 2024-11-18 LAB
ALBUMIN SERPL-MCNC: 3.4 G/DL (ref 3.4–5)
ALBUMIN/GLOB SERPL: 1.2 RATIO
ALP SERPL-CCNC: 114 UNIT/L (ref 50–144)
ALT SERPL-CCNC: 11 UNIT/L (ref 1–45)
ANION GAP SERPL CALC-SCNC: 7 MEQ/L (ref 2–13)
AST SERPL-CCNC: 20 UNIT/L (ref 14–36)
BASOPHILS # BLD AUTO: 0.02 X10(3)/MCL (ref 0.01–0.08)
BASOPHILS NFR BLD AUTO: 0.2 % (ref 0.1–1.2)
BILIRUB SERPL-MCNC: 0.4 MG/DL (ref 0–1)
BILIRUB UR QL STRIP: NORMAL
BUN SERPL-MCNC: 6 MG/DL (ref 7–20)
CALCIUM SERPL-MCNC: 9.1 MG/DL (ref 8.4–10.2)
CHLORIDE SERPL-SCNC: 108 MMOL/L (ref 98–110)
CO2 SERPL-SCNC: 20 MMOL/L (ref 21–32)
CREAT SERPL-MCNC: 0.51 MG/DL (ref 0.66–1.25)
CREAT/UREA NIT SERPL: 12 (ref 12–20)
EOSINOPHIL # BLD AUTO: 0.17 X10(3)/MCL (ref 0.04–0.36)
EOSINOPHIL NFR BLD AUTO: 1.7 % (ref 0.7–7)
ERYTHROCYTE [DISTWIDTH] IN BLOOD BY AUTOMATED COUNT: 14.9 % (ref 11–14.5)
GFR SERPLBLD CREATININE-BSD FMLA CKD-EPI: >90 ML/MIN/1.73/M2
GLOBULIN SER-MCNC: 2.9 GM/DL (ref 2–3.9)
GLUCOSE SERPL-MCNC: 82 MG/DL (ref 70–115)
GLUCOSE UR QL STRIP: NEGATIVE
GROUP & RH: NORMAL
HCT VFR BLD AUTO: 33 % (ref 36–48)
HCT VFR BLD AUTO: 33.4 % (ref 36–48)
HGB BLD-MCNC: 10.8 G/DL (ref 11.8–16)
HGB BLD-MCNC: 11.1 G/DL (ref 11.8–16)
IMM GRANULOCYTES # BLD AUTO: 0.09 X10(3)/MCL (ref 0–0.03)
IMM GRANULOCYTES NFR BLD AUTO: 0.9 % (ref 0–0.5)
INDIRECT COOMBS: NORMAL
KETONES UR QL STRIP: NORMAL
LEUKOCYTE ESTERASE UR QL STRIP: NEGATIVE
LYMPHOCYTES # BLD AUTO: 1.96 X10(3)/MCL (ref 1.16–3.74)
LYMPHOCYTES NFR BLD AUTO: 19.2 % (ref 20–55)
MCH RBC QN AUTO: 25.7 PG (ref 27–34)
MCHC RBC AUTO-ENTMCNC: 32.7 G/DL (ref 31–37)
MCV RBC AUTO: 78.6 FL (ref 79–99)
MONOCYTES # BLD AUTO: 0.74 X10(3)/MCL (ref 0.24–0.36)
MONOCYTES NFR BLD AUTO: 7.2 % (ref 4.7–12.5)
NEUTROPHILS # BLD AUTO: 7.24 X10(3)/MCL (ref 1.56–6.13)
NEUTROPHILS NFR BLD AUTO: 70.8 % (ref 37–73)
NRBC BLD AUTO-RTO: 0 %
PH, POC UA: NORMAL
PLATELET # BLD AUTO: 376 X10(3)/MCL (ref 140–371)
PMV BLD AUTO: 8.9 FL (ref 9.4–12.4)
POC BLOOD, URINE: NORMAL
POC NITRATES, URINE: NEGATIVE
POTASSIUM SERPL-SCNC: 3.7 MMOL/L (ref 3.5–5.1)
PROT SERPL-MCNC: 6.3 GM/DL (ref 6.3–8.2)
PROT UR QL STRIP: NEGATIVE
RBC # BLD AUTO: 4.2 X10(6)/MCL (ref 4–5.1)
SODIUM SERPL-SCNC: 135 MMOL/L (ref 136–145)
SP GR UR STRIP: NORMAL (ref 1–1.03)
SPECIMEN OUTDATE: NORMAL
UROBILINOGEN UR STRIP-ACNC: NORMAL (ref 0.3–2.2)
WBC # BLD AUTO: 10.22 X10(3)/MCL (ref 4–11.5)

## 2024-11-18 PROCEDURE — 25000003 PHARM REV CODE 250: Performed by: OBSTETRICS & GYNECOLOGY

## 2024-11-18 PROCEDURE — 85014 HEMATOCRIT: CPT | Performed by: OBSTETRICS & GYNECOLOGY

## 2024-11-18 PROCEDURE — 59409 OBSTETRICAL CARE: CPT | Mod: AT,,, | Performed by: OBSTETRICS & GYNECOLOGY

## 2024-11-18 PROCEDURE — 63600175 PHARM REV CODE 636 W HCPCS: Performed by: OBSTETRICS & GYNECOLOGY

## 2024-11-18 PROCEDURE — 62326 NJX INTERLAMINAR LMBR/SAC: CPT | Performed by: NURSE ANESTHETIST, CERTIFIED REGISTERED

## 2024-11-18 PROCEDURE — 85025 COMPLETE CBC W/AUTO DIFF WBC: CPT | Performed by: OBSTETRICS & GYNECOLOGY

## 2024-11-18 PROCEDURE — 86900 BLOOD TYPING SEROLOGIC ABO: CPT | Performed by: OBSTETRICS & GYNECOLOGY

## 2024-11-18 PROCEDURE — 59409 OBSTETRICAL CARE: CPT | Mod: ,,, | Performed by: NURSE ANESTHETIST, CERTIFIED REGISTERED

## 2024-11-18 PROCEDURE — 11000001 HC ACUTE MED/SURG PRIVATE ROOM

## 2024-11-18 PROCEDURE — 63600175 PHARM REV CODE 636 W HCPCS: Mod: JZ,JG | Performed by: NURSE ANESTHETIST, CERTIFIED REGISTERED

## 2024-11-18 PROCEDURE — 36415 COLL VENOUS BLD VENIPUNCTURE: CPT | Performed by: OBSTETRICS & GYNECOLOGY

## 2024-11-18 PROCEDURE — 80053 COMPREHEN METABOLIC PANEL: CPT | Performed by: OBSTETRICS & GYNECOLOGY

## 2024-11-18 RX ORDER — LANOLIN ALCOHOL/MO/W.PET/CERES
1 CREAM (GRAM) TOPICAL DAILY
Status: DISCONTINUED | OUTPATIENT
Start: 2024-11-18 | End: 2024-11-21 | Stop reason: HOSPADM

## 2024-11-18 RX ORDER — OXYTOCIN 10 [USP'U]/ML
10 INJECTION, SOLUTION INTRAMUSCULAR; INTRAVENOUS ONCE AS NEEDED
Status: DISCONTINUED | OUTPATIENT
Start: 2024-11-18 | End: 2024-11-18

## 2024-11-18 RX ORDER — METHYLERGONOVINE MALEATE 0.2 MG/ML
200 INJECTION INTRAVENOUS ONCE AS NEEDED
Status: DISCONTINUED | OUTPATIENT
Start: 2024-11-18 | End: 2024-11-21 | Stop reason: HOSPADM

## 2024-11-18 RX ORDER — DIPHENOXYLATE HYDROCHLORIDE AND ATROPINE SULFATE 2.5; .025 MG/1; MG/1
2 TABLET ORAL EVERY 6 HOURS PRN
Status: DISCONTINUED | OUTPATIENT
Start: 2024-11-18 | End: 2024-11-18

## 2024-11-18 RX ORDER — OXYTOCIN-SODIUM CHLORIDE 0.9% IV SOLN 30 UNIT/500ML 30-0.9/5 UT/ML-%
10 SOLUTION INTRAVENOUS ONCE
Status: DISCONTINUED | OUTPATIENT
Start: 2024-11-18 | End: 2024-11-18

## 2024-11-18 RX ORDER — HYDROCODONE BITARTRATE AND ACETAMINOPHEN 7.5; 325 MG/1; MG/1
1 TABLET ORAL EVERY 4 HOURS PRN
Status: DISCONTINUED | OUTPATIENT
Start: 2024-11-18 | End: 2024-11-21 | Stop reason: HOSPADM

## 2024-11-18 RX ORDER — SODIUM CHLORIDE, SODIUM LACTATE, POTASSIUM CHLORIDE, CALCIUM CHLORIDE 600; 310; 30; 20 MG/100ML; MG/100ML; MG/100ML; MG/100ML
INJECTION, SOLUTION INTRAVENOUS CONTINUOUS
Status: DISCONTINUED | OUTPATIENT
Start: 2024-11-18 | End: 2024-11-18

## 2024-11-18 RX ORDER — BUPIVACAINE HYDROCHLORIDE 2.5 MG/ML
INJECTION, SOLUTION EPIDURAL; INFILTRATION; INTRACAUDAL
Status: COMPLETED
Start: 2024-11-18 | End: 2024-11-18

## 2024-11-18 RX ORDER — ADHESIVE BANDAGE
30 BANDAGE TOPICAL NIGHTLY PRN
Status: DISCONTINUED | OUTPATIENT
Start: 2024-11-18 | End: 2024-11-21 | Stop reason: HOSPADM

## 2024-11-18 RX ORDER — ALUMINUM HYDROXIDE, MAGNESIUM HYDROXIDE, AND SIMETHICONE 1200; 120; 1200 MG/30ML; MG/30ML; MG/30ML
30 SUSPENSION ORAL EVERY 6 HOURS PRN
Status: DISCONTINUED | OUTPATIENT
Start: 2024-11-18 | End: 2024-11-21 | Stop reason: HOSPADM

## 2024-11-18 RX ORDER — BUTORPHANOL TARTRATE 2 MG/ML
1 INJECTION INTRAMUSCULAR; INTRAVENOUS
Status: DISCONTINUED | OUTPATIENT
Start: 2024-11-18 | End: 2024-11-18

## 2024-11-18 RX ORDER — FENTANYL CITRATE 50 UG/ML
50 INJECTION, SOLUTION INTRAMUSCULAR; INTRAVENOUS ONCE
Status: DISCONTINUED | OUTPATIENT
Start: 2024-11-18 | End: 2024-11-21 | Stop reason: HOSPADM

## 2024-11-18 RX ORDER — LIDOCAINE HCL/EPINEPHRINE/PF 2%-1:200K
1 VIAL (ML) INJECTION ONCE
Status: COMPLETED | OUTPATIENT
Start: 2024-11-18 | End: 2024-11-18

## 2024-11-18 RX ORDER — DIPHENHYDRAMINE HYDROCHLORIDE 50 MG/ML
25 INJECTION INTRAMUSCULAR; INTRAVENOUS EVERY 4 HOURS PRN
Status: DISCONTINUED | OUTPATIENT
Start: 2024-11-18 | End: 2024-11-21 | Stop reason: HOSPADM

## 2024-11-18 RX ORDER — CEFAZOLIN 2 G/1
2 INJECTION, POWDER, FOR SOLUTION INTRAMUSCULAR; INTRAVENOUS ONCE AS NEEDED
Status: DISCONTINUED | OUTPATIENT
Start: 2024-11-18 | End: 2024-11-18

## 2024-11-18 RX ORDER — MISOPROSTOL 100 UG/1
800 TABLET ORAL ONCE AS NEEDED
Status: DISCONTINUED | OUTPATIENT
Start: 2024-11-18 | End: 2024-11-21 | Stop reason: HOSPADM

## 2024-11-18 RX ORDER — ROPIVACAINE HYDROCHLORIDE 2 MG/ML
12 INJECTION, SOLUTION EPIDURAL; INFILTRATION CONTINUOUS
Status: DISCONTINUED | OUTPATIENT
Start: 2024-11-18 | End: 2024-11-18

## 2024-11-18 RX ORDER — DOCUSATE SODIUM 100 MG/1
200 CAPSULE, LIQUID FILLED ORAL 2 TIMES DAILY PRN
Status: DISCONTINUED | OUTPATIENT
Start: 2024-11-18 | End: 2024-11-21 | Stop reason: HOSPADM

## 2024-11-18 RX ORDER — SODIUM CHLORIDE 0.9 % (FLUSH) 0.9 %
3 SYRINGE (ML) INJECTION
Status: DISCONTINUED | OUTPATIENT
Start: 2024-11-18 | End: 2024-11-21 | Stop reason: HOSPADM

## 2024-11-18 RX ORDER — TRANEXAMIC ACID 10 MG/ML
1000 INJECTION, SOLUTION INTRAVENOUS EVERY 30 MIN PRN
Status: DISCONTINUED | OUTPATIENT
Start: 2024-11-18 | End: 2024-11-21 | Stop reason: HOSPADM

## 2024-11-18 RX ORDER — SODIUM CHLORIDE 9 MG/ML
INJECTION, SOLUTION INTRAVENOUS
Status: DISCONTINUED | OUTPATIENT
Start: 2024-11-18 | End: 2024-11-18

## 2024-11-18 RX ORDER — SIMETHICONE 80 MG
1 TABLET,CHEWABLE ORAL EVERY 6 HOURS PRN
Status: DISCONTINUED | OUTPATIENT
Start: 2024-11-18 | End: 2024-11-21 | Stop reason: HOSPADM

## 2024-11-18 RX ORDER — OXYTOCIN-SODIUM CHLORIDE 0.9% IV SOLN 30 UNIT/500ML 30-0.9/5 UT/ML-%
95 SOLUTION INTRAVENOUS ONCE AS NEEDED
Status: DISCONTINUED | OUTPATIENT
Start: 2024-11-18 | End: 2024-11-20

## 2024-11-18 RX ORDER — SIMETHICONE 80 MG
1 TABLET,CHEWABLE ORAL 4 TIMES DAILY PRN
Status: DISCONTINUED | OUTPATIENT
Start: 2024-11-18 | End: 2024-11-18

## 2024-11-18 RX ORDER — BUPIVACAINE HYDROCHLORIDE 2.5 MG/ML
INJECTION, SOLUTION EPIDURAL; INFILTRATION; INTRACAUDAL
Status: DISCONTINUED | OUTPATIENT
Start: 2024-11-18 | End: 2024-11-18

## 2024-11-18 RX ORDER — ACETAMINOPHEN 325 MG/1
650 TABLET ORAL EVERY 6 HOURS PRN
Status: DISCONTINUED | OUTPATIENT
Start: 2024-11-18 | End: 2024-11-21 | Stop reason: HOSPADM

## 2024-11-18 RX ORDER — ONDANSETRON 4 MG/1
8 TABLET, ORALLY DISINTEGRATING ORAL EVERY 8 HOURS PRN
Status: DISCONTINUED | OUTPATIENT
Start: 2024-11-18 | End: 2024-11-18

## 2024-11-18 RX ORDER — ROPIVACAINE HYDROCHLORIDE 2 MG/ML
INJECTION, SOLUTION EPIDURAL; INFILTRATION; PERINEURAL
Status: COMPLETED
Start: 2024-11-18 | End: 2024-11-18

## 2024-11-18 RX ORDER — DIPHENHYDRAMINE HCL 25 MG
25 CAPSULE ORAL EVERY 4 HOURS PRN
Status: DISCONTINUED | OUTPATIENT
Start: 2024-11-18 | End: 2024-11-21 | Stop reason: HOSPADM

## 2024-11-18 RX ORDER — IBUPROFEN 600 MG/1
600 TABLET ORAL EVERY 6 HOURS PRN
Status: DISCONTINUED | OUTPATIENT
Start: 2024-11-18 | End: 2024-11-21 | Stop reason: HOSPADM

## 2024-11-18 RX ORDER — TERBUTALINE SULFATE 1 MG/ML
0.25 INJECTION SUBCUTANEOUS
Status: DISCONTINUED | OUTPATIENT
Start: 2024-11-18 | End: 2024-11-18

## 2024-11-18 RX ORDER — HYDROMORPHONE HYDROCHLORIDE 1 MG/ML
1 INJECTION, SOLUTION INTRAMUSCULAR; INTRAVENOUS; SUBCUTANEOUS EVERY 6 HOURS PRN
OUTPATIENT
Start: 2024-11-18

## 2024-11-18 RX ORDER — MISOPROSTOL 100 UG/1
800 TABLET ORAL ONCE AS NEEDED
Status: DISCONTINUED | OUTPATIENT
Start: 2024-11-18 | End: 2024-11-18

## 2024-11-18 RX ORDER — CALCIUM CARBONATE 200(500)MG
500 TABLET,CHEWABLE ORAL 3 TIMES DAILY PRN
Status: DISCONTINUED | OUTPATIENT
Start: 2024-11-18 | End: 2024-11-18

## 2024-11-18 RX ORDER — ACETAMINOPHEN 325 MG/1
650 TABLET ORAL EVERY 6 HOURS PRN
Status: DISCONTINUED | OUTPATIENT
Start: 2024-11-18 | End: 2024-11-18

## 2024-11-18 RX ORDER — DIPHENOXYLATE HYDROCHLORIDE AND ATROPINE SULFATE 2.5; .025 MG/1; MG/1
2 TABLET ORAL EVERY 6 HOURS PRN
Status: DISCONTINUED | OUTPATIENT
Start: 2024-11-18 | End: 2024-11-21 | Stop reason: HOSPADM

## 2024-11-18 RX ORDER — FENTANYL CITRATE 50 UG/ML
INJECTION, SOLUTION INTRAMUSCULAR; INTRAVENOUS
Status: COMPLETED
Start: 2024-11-18 | End: 2024-11-18

## 2024-11-18 RX ORDER — OXYTOCIN-SODIUM CHLORIDE 0.9% IV SOLN 30 UNIT/500ML 30-0.9/5 UT/ML-%
95 SOLUTION INTRAVENOUS ONCE
Status: DISCONTINUED | OUTPATIENT
Start: 2024-11-18 | End: 2024-11-21 | Stop reason: HOSPADM

## 2024-11-18 RX ORDER — OXYTOCIN 10 [USP'U]/ML
10 INJECTION, SOLUTION INTRAMUSCULAR; INTRAVENOUS ONCE AS NEEDED
Status: DISCONTINUED | OUTPATIENT
Start: 2024-11-18 | End: 2024-11-21 | Stop reason: HOSPADM

## 2024-11-18 RX ORDER — HYDROCORTISONE 25 MG/G
CREAM TOPICAL 3 TIMES DAILY PRN
Status: DISCONTINUED | OUTPATIENT
Start: 2024-11-18 | End: 2024-11-21 | Stop reason: HOSPADM

## 2024-11-18 RX ORDER — PROCHLORPERAZINE EDISYLATE 5 MG/ML
5 INJECTION INTRAMUSCULAR; INTRAVENOUS EVERY 6 HOURS PRN
Status: DISCONTINUED | OUTPATIENT
Start: 2024-11-18 | End: 2024-11-18

## 2024-11-18 RX ORDER — OXYTOCIN-SODIUM CHLORIDE 0.9% IV SOLN 30 UNIT/500ML 30-0.9/5 UT/ML-%
0-32 SOLUTION INTRAVENOUS CONTINUOUS
Status: DISCONTINUED | OUTPATIENT
Start: 2024-11-18 | End: 2024-11-18

## 2024-11-18 RX ORDER — TRANEXAMIC ACID 10 MG/ML
1000 INJECTION, SOLUTION INTRAVENOUS EVERY 30 MIN PRN
Status: DISCONTINUED | OUTPATIENT
Start: 2024-11-18 | End: 2024-11-18

## 2024-11-18 RX ORDER — CARBOPROST TROMETHAMINE 250 UG/ML
250 INJECTION, SOLUTION INTRAMUSCULAR
Status: DISCONTINUED | OUTPATIENT
Start: 2024-11-18 | End: 2024-11-21 | Stop reason: HOSPADM

## 2024-11-18 RX ORDER — LIDOCAINE HYDROCHLORIDE AND EPINEPHRINE 15; 5 MG/ML; UG/ML
INJECTION, SOLUTION EPIDURAL
Status: DISCONTINUED | OUTPATIENT
Start: 2024-11-18 | End: 2024-11-18

## 2024-11-18 RX ORDER — FENTANYL CITRATE 50 UG/ML
INJECTION, SOLUTION INTRAMUSCULAR; INTRAVENOUS
Status: DISCONTINUED | OUTPATIENT
Start: 2024-11-18 | End: 2024-11-18

## 2024-11-18 RX ORDER — BUTORPHANOL TARTRATE 2 MG/ML
2 INJECTION INTRAMUSCULAR; INTRAVENOUS
Status: DISCONTINUED | OUTPATIENT
Start: 2024-11-18 | End: 2024-11-18

## 2024-11-18 RX ORDER — METHYLERGONOVINE MALEATE 0.2 MG/ML
200 INJECTION INTRAVENOUS ONCE AS NEEDED
Status: DISCONTINUED | OUTPATIENT
Start: 2024-11-18 | End: 2024-11-18

## 2024-11-18 RX ORDER — BISACODYL 10 MG/1
10 SUPPOSITORY RECTAL DAILY PRN
Status: DISCONTINUED | OUTPATIENT
Start: 2024-11-18 | End: 2024-11-21 | Stop reason: HOSPADM

## 2024-11-18 RX ORDER — LIDOCAINE HCL/EPINEPHRINE/PF 2%-1:200K
VIAL (ML) INJECTION
Status: COMPLETED
Start: 2024-11-18 | End: 2024-11-18

## 2024-11-18 RX ORDER — PRENATAL WITH FERROUS FUM AND FOLIC ACID 3080; 920; 120; 400; 22; 1.84; 3; 20; 10; 1; 12; 200; 27; 25; 2 [IU]/1; [IU]/1; MG/1; [IU]/1; MG/1; MG/1; MG/1; MG/1; MG/1; MG/1; UG/1; MG/1; MG/1; MG/1; MG/1
1 TABLET ORAL DAILY
Status: DISCONTINUED | OUTPATIENT
Start: 2024-11-18 | End: 2024-11-21 | Stop reason: HOSPADM

## 2024-11-18 RX ORDER — OXYTOCIN-SODIUM CHLORIDE 0.9% IV SOLN 30 UNIT/500ML 30-0.9/5 UT/ML-%
0-32 SOLUTION INTRAVENOUS CONTINUOUS
Status: DISCONTINUED | OUTPATIENT
Start: 2024-11-18 | End: 2024-11-19

## 2024-11-18 RX ORDER — ONDANSETRON 4 MG/1
8 TABLET, ORALLY DISINTEGRATING ORAL EVERY 8 HOURS PRN
Status: DISCONTINUED | OUTPATIENT
Start: 2024-11-18 | End: 2024-11-21 | Stop reason: HOSPADM

## 2024-11-18 RX ORDER — BUPIVACAINE HYDROCHLORIDE 2.5 MG/ML
5 INJECTION, SOLUTION EPIDURAL; INFILTRATION; INTRACAUDAL ONCE
Status: DISCONTINUED | OUTPATIENT
Start: 2024-11-18 | End: 2024-11-21 | Stop reason: HOSPADM

## 2024-11-18 RX ORDER — OXYTOCIN-SODIUM CHLORIDE 0.9% IV SOLN 30 UNIT/500ML 30-0.9/5 UT/ML-%
30 SOLUTION INTRAVENOUS ONCE AS NEEDED
Status: DISCONTINUED | OUTPATIENT
Start: 2024-11-18 | End: 2024-11-20 | Stop reason: SDUPTHER

## 2024-11-18 RX ORDER — OXYTOCIN-SODIUM CHLORIDE 0.9% IV SOLN 30 UNIT/500ML 30-0.9/5 UT/ML-%
95 SOLUTION INTRAVENOUS ONCE
Status: DISCONTINUED | OUTPATIENT
Start: 2024-11-18 | End: 2024-11-18

## 2024-11-18 RX ORDER — OXYTOCIN-SODIUM CHLORIDE 0.9% IV SOLN 30 UNIT/500ML 30-0.9/5 UT/ML-%
30 SOLUTION INTRAVENOUS ONCE AS NEEDED
Status: DISCONTINUED | OUTPATIENT
Start: 2024-11-18 | End: 2024-11-18

## 2024-11-18 RX ORDER — PROCHLORPERAZINE EDISYLATE 5 MG/ML
5 INJECTION INTRAMUSCULAR; INTRAVENOUS EVERY 6 HOURS PRN
Status: DISCONTINUED | OUTPATIENT
Start: 2024-11-18 | End: 2024-11-21 | Stop reason: HOSPADM

## 2024-11-18 RX ORDER — LIDOCAINE HYDROCHLORIDE 10 MG/ML
10 INJECTION, SOLUTION INFILTRATION; PERINEURAL ONCE AS NEEDED
Status: DISCONTINUED | OUTPATIENT
Start: 2024-11-18 | End: 2024-11-18

## 2024-11-18 RX ORDER — PENICILLIN G 3000000 [IU]/50ML
3 INJECTION, SOLUTION INTRAVENOUS
Status: DISCONTINUED | OUTPATIENT
Start: 2024-11-18 | End: 2024-11-18

## 2024-11-18 RX ORDER — OXYTOCIN-SODIUM CHLORIDE 0.9% IV SOLN 30 UNIT/500ML 30-0.9/5 UT/ML-%
95 SOLUTION INTRAVENOUS ONCE AS NEEDED
Status: DISCONTINUED | OUTPATIENT
Start: 2024-11-18 | End: 2024-11-18

## 2024-11-18 RX ORDER — CARBOPROST TROMETHAMINE 250 UG/ML
250 INJECTION, SOLUTION INTRAMUSCULAR
Status: DISCONTINUED | OUTPATIENT
Start: 2024-11-18 | End: 2024-11-18

## 2024-11-18 RX ORDER — MISOPROSTOL 100 MCG
25 TABLET ORAL EVERY 4 HOURS PRN
Status: DISCONTINUED | OUTPATIENT
Start: 2024-11-18 | End: 2024-11-18

## 2024-11-18 RX ADMIN — BUPIVACAINE HYDROCHLORIDE 3 MG: 2.5 INJECTION, SOLUTION EPIDURAL; INFILTRATION; INTRACAUDAL; PERINEURAL at 02:11

## 2024-11-18 RX ADMIN — Medication 4 MILLI-UNITS/MIN: at 02:11

## 2024-11-18 RX ADMIN — IBUPROFEN 600 MG: 600 TABLET, FILM COATED ORAL at 11:11

## 2024-11-18 RX ADMIN — FENTANYL CITRATE 50 MCG: 50 INJECTION, SOLUTION INTRAMUSCULAR; INTRAVENOUS at 02:11

## 2024-11-18 RX ADMIN — ROPIVACAINE HYDROCHLORIDE 12 ML/HR: 2 INJECTION, SOLUTION EPIDURAL; INFILTRATION at 12:11

## 2024-11-18 RX ADMIN — LIDOCAINE HYDROCHLORIDE AND EPINEPHRINE 3 ML: 20; 5 INJECTION, SOLUTION EPIDURAL; INFILTRATION; INTRACAUDAL; PERINEURAL at 12:11

## 2024-11-18 RX ADMIN — LIDOCAINE HYDROCHLORIDE AND EPINEPHRINE 2 ML: 15; 5 INJECTION, SOLUTION EPIDURAL at 12:11

## 2024-11-18 RX ADMIN — OXYTOCIN-SODIUM CHLORIDE 0.9% IV SOLN 30 UNIT/500ML 10 UNITS: 30-0.9/5 SOLUTION at 03:11

## 2024-11-18 RX ADMIN — IBUPROFEN 600 MG: 600 TABLET, FILM COATED ORAL at 03:11

## 2024-11-18 RX ADMIN — LIDOCAINE HYDROCHLORIDE AND EPINEPHRINE 3 ML: 15; 5 INJECTION, SOLUTION EPIDURAL at 12:11

## 2024-11-18 NOTE — INTERVAL H&P NOTE
The patient has been examined and the H&P has been reviewed:    I concur with the findings and no changes have occurred since H&P was written.        Active Hospital Problems    Diagnosis  POA    *37 weeks gestation of pregnancy [Z3A.37]  Not Applicable    HTN (hypertension) [I10]  Yes      Resolved Hospital Problems   No resolved problems to display.     Admit  Continuous fetal monitoring   ASVD  Epidural if desires   I am aware of the plan of care prior to the administration of CRNA anesthesia services.    [facility] OA protocol

## 2024-11-18 NOTE — ANESTHESIA PROCEDURE NOTES
Epidural    Patient location during procedure: OB   Reason for block: primary anesthetic   Reason for block: at surgeon's request, post-op pain management  Diagnosis: Active Labor   Start time: 11/18/2024 12:28 PM  Timeout: 11/18/2024 12:25 PM  End time: 11/18/2024 12:38 PM    Staffing  Performing Provider: Viktor Trujillo CRNA  Authorizing Provider: Viktor Trujillo CRNA    Staffing  Performed by: Viktor Trujillo CRNA  Authorized by: Viktor Trujillo CRNA        Preanesthetic Checklist  Completed: patient identified, IV checked, site marked, risks and benefits discussed, surgical consent, monitors and equipment checked, pre-op evaluation, timeout performed, anesthesia consent given, hand hygiene performed and patient being monitored  Preparation  Patient position: sitting  Prep: ChloraPrep  Reason for block: primary anesthetic   Epidural  Skin Anesthetic: lidocaine 1%  Administration type: single shot  Approach: midline  Interspace: L3-4    Block type: caudal.  Needle and Epidural Catheter  Needle type: Tuohy   Needle gauge: 18  Needle length: 5.0 inches  Needle insertion depth: 6.5 cm  Catheter type: multi-orifice  Catheter size: 20 G  Catheter at skin depth: 10 cm  Insertion Attempts: 1  Test dose: 3 mL of lidocaine 1.5% with Epi 1-to-200,000  Additional Documentation: incremental injection, negative aspiration for heme and CSF and no paresthesia on injection  Needle localization: anatomical landmarks  Medications:  Volume per aspiration: 0 mL  Time between aspirations: 3 minutes   Assessment  Upper dermatomal levels - Left: T10  Right: T10   Dermatomal levels determined by alcohol wipe  Ease of block: easy  Patient's tolerance of the procedure: comfortable throughout block No inadvertent dural puncture with Tuohy.  Dural puncture not performed with spinal needle

## 2024-11-18 NOTE — ANESTHESIA PREPROCEDURE EVALUATION
11/18/2024  Tony Ruggiero is a 29 y.o., female.    Surgical History    Procedure Laterality Date Comment Source   LAPAROSCOPIC CHOLECYSTECTOMY N/A 04/13/2023 Procedure: CHOLECYSTECTOMY, LAPAROSCOPIC;  Surgeon: Екатерина Krishna MD;  Location: [facility];  Service: General;  Laterality: N/A;      Medical History    Diagnosis Date Comment Source   Gestational hypertension        Pre-op Assessment    I have reviewed the Patient Summary Reports.     I have reviewed the Nursing Notes. I have reviewed the NPO Status.   I have reviewed the Medications.     Review of Systems  Anesthesia Hx:  No problems with previous Anesthesia             Denies Family Hx of Anesthesia complications.    Denies Personal Hx of Anesthesia complications.                    Hematology/Oncology:    Oncology Normal    -- Anemia:                                  EENT/Dental:  EENT/Dental Normal           Cardiovascular:  Exercise tolerance: poor   Hypertension                                          Pulmonary:  Pulmonary Normal                       Renal/:  Renal/ Normal                 Hepatic/GI:  Hepatic/GI Normal                    Musculoskeletal:  Musculoskeletal Normal                Neurological:  Neurology Normal                                      Endocrine:  Endocrine Normal          Morbid Obesity / BMI > 40  Dermatological:  Skin Normal    Psych:  Psychiatric Normal                    Physical Exam  General: Well nourished, Cooperative, Alert and Oriented    Airway:  Mallampati: II / II  Mouth Opening: Normal  TM Distance: Normal  Tongue: Normal  Neck ROM: Normal ROM    Dental:  Intact        Anesthesia Plan  Type of Anesthesia, risks & benefits discussed:    Anesthesia Type: Epidural  Intra-op Monitoring Plan: Standard ASA Monitors  Post Op Pain Control Plan: epidural analgesia  Informed Consent: Informed consent signed  with the Patient and all parties understand the risks and agree with anesthesia plan.  All questions answered. Patient consented to blood products? Yes  ASA Score: 3  Day of Surgery Review of History & Physical: H&P Update referred to the surgeon/provider.I have interviewed and examined the patient. I have reviewed the patient's H&P dated: There are no significant changes.     Ready For Surgery From Anesthesia Perspective.     .

## 2024-11-18 NOTE — ANESTHESIA POSTPROCEDURE EVALUATION
Anesthesia Post Evaluation    Patient: Tony Ruggiero    Procedure(s) Performed: * No procedures listed *    Final Anesthesia Type: epidural      Patient location during evaluation: labor & delivery  Patient participation: Yes- Able to Participate  Level of consciousness: awake and alert, awake and oriented  Post-procedure vital signs: reviewed and stable  Pain management: adequate  Airway patency: patent    PONV status at discharge: No PONV  Anesthetic complications: no      Cardiovascular status: blood pressure returned to baseline  Respiratory status: unassisted, room air and spontaneous ventilation  Hydration status: euvolemic  Follow-up not needed.              Vitals Value Taken Time   /76 11/18/24 1558   Temp 36.4 °C (97.6 °F) 11/18/24 1542   Pulse 77 11/18/24 1558   Resp 20 11/18/24 1114   SpO2 100 % 11/18/24 1458   Vitals shown include unfiled device data.      No case tracking events are documented in the log.      Pain/Vimal Score: Pain Rating Prior to Med Admin: 6 (11/18/2024  3:42 PM)

## 2024-11-18 NOTE — L&D DELIVERY NOTE
[facility]  Vaginal Delivery   Operative Note    SUMMARY     [facility]  Vaginal Delivery Note      Pre Operative Diagnosis: 37w1d   Active Hospital Problems    Diagnosis  POA    *37 weeks gestation of pregnancy [Z3A.37]  Not Applicable    HTN (hypertension) [I10]  Yes      Resolved Hospital Problems   No resolved problems to display.       Post Operative Diagnosis: same  Procedure: Spontaneous vaginal delivery  Provider: Ilene Velez MD  Anesthisa: epidural  EBL: 200 cc  Complications: none    Indications:  Tony Ruggiero is a 29 y.o.  female with IUP at 37w1d  who is being admitted for labor.     Procedure in detail: Patient found to be complete.  Anesthesia was found to be adequate.  Patient was placed in the dorsal lithotomy position.  Patient was prepped and draped in normal standard sterile fashion. Patient was allowed to Valsalva.  Gentle downward traction in the axial plane was applied to the fetus avoiding neck deviation. No evidence of shoulder dystocia. Patient delivered a viable, vigorous infant moving both arms without difficulty after birth, see below.  1 minute delayed cord clamp.  Placenta delivered spontaneous intact with a three-vessel cord.  Following this cervix and vagina were examined for lacerations, none were found.  Excellent hemostasis was achieved with IV Pitocin and uterine massage.  Mother and infant both doing well.  Infant was placed on mother's chest for skin to skin.     Sponge needle and lap counted correctly x2.           Indications: 37 weeks gestation of pregnancy  Pregnancy complicated by:   Patient Active Problem List   Diagnosis    HTN (hypertension)    SIRS (systemic inflammatory response syndrome)    Biloma following surgery    - Abnormal findings on prenatal screening    -BMI affecting pregnancy in third trimester    - Chronic hypertension affecting pregnancy    Echogenic intracardiac focus of fetus on  "prenatal ultrasound    37 weeks gestation of pregnancy     Admitting GA: 37w1d    Delivery Information for Jose Ruggiero    Birth information:  YOB: 2022   Time of birth: 2:43 PM   Sex: female   Head Delivery Date/Time: 2024  2:43 PM   Delivery type: Vaginal, Spontaneous   Gestational Age: 37w1d       Delivery Providers    Delivering clinician: Ilene Velez MD           Measurements    Weight: 3025 g  Weight (lbs): 6 lb 10.7 oz  Length: 50.8 cm  Length (in): 20"  Head circumference: 33 cm         Apgars    Living status: Living  Apgar Component Scores:  1 min.:  5 min.:  10 min.:  15 min.:  20 min.:    Skin color:  1  1       Heart rate:  2  2       Reflex irritability:  2  2       Muscle tone:  2  2       Respiratory effort:  1  2       Total:  8  9       Apgars assigned by: WOLF CARTER         Operative Delivery    Forceps attempted?: No  Vacuum extractor attempted?: No         Shoulder Dystocia    Shoulder dystocia present?: No           Presentation    Presentation: Vertex  Position: Right Occiput Anterior           Interventions/Resuscitation    Method: Bulb Suctioning, Tactile Stimulation       Cord    Vessels: 3 vessels  Complications: None  Delayed Cord Clamping?: Yes  Cord Clamped Date/Time: 2024  2:43 PM  Cord Blood Disposition: Lab  Gases Sent?: No  Stem Cell Collection (by MD): No       Placenta    Placenta delivery date/time: 2024 1451  Placenta removal: Spontaneous  Placenta appearance: Intact  Placenta disposition: Discarded           Labor Events:       labor: No     Labor Onset Date/Time: 2024 10:45     Dilation Complete Date/Time: 2024 14:33     Start Pushing Date/Time: 2024 14:41     Rupture Date/Time: 24 1145        Rupture type: INT (Intact)        Fluid Amount:       Fluid Color: Clear      steroids: None     Antibiotics given for GBS: No     Induction: none     Indications for induction:        Augmentation: " oxytocin     Indications for augmentation: Ineffective Contraction Pattern     Labor complications: None     Additional complications:          Cervical ripening:                     Delivery:      Episiotomy: None     Indication for Episiotomy:       Perineal Lacerations: None Repaired:      Periurethral Laceration:   Repaired:     Labial Laceration:   Repaired:     Sulcus Laceration:   Repaired:     Vaginal Laceration:   Repaired:     Cervical Laceration:   Repaired:     Repair suture: None     Repair # of packets:       Last Value - EBL - Nursing (mL):       Sum - EBL - Nursing (mL): 0     Last Value - EBL - Anesthesia (mL):      Calculated QBL (mL):       Running total QBL (mL):       Vaginal Sweep Performed: No     Surgicount Correct: Yes       Other providers:       Anesthesia    Method: Epidural          Details (if applicable):  Trial of Labor      Categorization:      Priority:     Indications for :     Incision Type:       Additional  information:  Forceps:    Vacuum:    Breech:    Observed anomalies    Other (Comments):

## 2024-11-19 LAB
ALBUMIN SERPL-MCNC: 3 G/DL (ref 3.4–5)
ALBUMIN/GLOB SERPL: 1 RATIO
ALP SERPL-CCNC: 92 UNIT/L (ref 50–144)
ALT SERPL-CCNC: 17 UNIT/L (ref 1–45)
ANION GAP SERPL CALC-SCNC: 7 MEQ/L (ref 2–13)
AST SERPL-CCNC: 36 UNIT/L (ref 14–36)
BASOPHILS # BLD AUTO: 0.02 X10(3)/MCL (ref 0.01–0.08)
BASOPHILS # BLD AUTO: 0.03 X10(3)/MCL (ref 0.01–0.08)
BASOPHILS NFR BLD AUTO: 0.2 % (ref 0.1–1.2)
BASOPHILS NFR BLD AUTO: 0.3 % (ref 0.1–1.2)
BILIRUB SERPL-MCNC: 0.5 MG/DL (ref 0–1)
BUN SERPL-MCNC: 7 MG/DL (ref 7–20)
CALCIUM SERPL-MCNC: 9.3 MG/DL (ref 8.4–10.2)
CHLORIDE SERPL-SCNC: 108 MMOL/L (ref 98–110)
CO2 SERPL-SCNC: 20 MMOL/L (ref 21–32)
CREAT SERPL-MCNC: 0.55 MG/DL (ref 0.66–1.25)
CREAT/UREA NIT SERPL: 13 (ref 12–20)
EOSINOPHIL # BLD AUTO: 0.13 X10(3)/MCL (ref 0.04–0.36)
EOSINOPHIL # BLD AUTO: 0.17 X10(3)/MCL (ref 0.04–0.36)
EOSINOPHIL NFR BLD AUTO: 1.1 % (ref 0.7–7)
EOSINOPHIL NFR BLD AUTO: 1.4 % (ref 0.7–7)
ERYTHROCYTE [DISTWIDTH] IN BLOOD BY AUTOMATED COUNT: 14.9 % (ref 11–14.5)
ERYTHROCYTE [DISTWIDTH] IN BLOOD BY AUTOMATED COUNT: 15 % (ref 11–14.5)
GFR SERPLBLD CREATININE-BSD FMLA CKD-EPI: >90 ML/MIN/1.73/M2
GLOBULIN SER-MCNC: 2.9 GM/DL (ref 2–3.9)
GLUCOSE SERPL-MCNC: 101 MG/DL (ref 70–115)
HCT VFR BLD AUTO: 30.1 % (ref 36–48)
HCT VFR BLD AUTO: 31.3 % (ref 36–48)
HGB BLD-MCNC: 10.1 G/DL (ref 11.8–16)
HGB BLD-MCNC: 10.3 G/DL (ref 11.8–16)
IMM GRANULOCYTES # BLD AUTO: 0.06 X10(3)/MCL (ref 0–0.03)
IMM GRANULOCYTES # BLD AUTO: 0.06 X10(3)/MCL (ref 0–0.03)
IMM GRANULOCYTES NFR BLD AUTO: 0.5 % (ref 0–0.5)
IMM GRANULOCYTES NFR BLD AUTO: 0.5 % (ref 0–0.5)
LYMPHOCYTES # BLD AUTO: 2.5 X10(3)/MCL (ref 1.16–3.74)
LYMPHOCYTES # BLD AUTO: 2.64 X10(3)/MCL (ref 1.16–3.74)
LYMPHOCYTES NFR BLD AUTO: 21 % (ref 20–55)
LYMPHOCYTES NFR BLD AUTO: 21 % (ref 20–55)
MAGNESIUM SERPL-MCNC: 4.1 MG/DL (ref 1.8–2.4)
MCH RBC QN AUTO: 25.9 PG (ref 27–34)
MCH RBC QN AUTO: 26.3 PG (ref 27–34)
MCHC RBC AUTO-ENTMCNC: 32.9 G/DL (ref 31–37)
MCHC RBC AUTO-ENTMCNC: 33.6 G/DL (ref 31–37)
MCV RBC AUTO: 78.4 FL (ref 79–99)
MCV RBC AUTO: 78.8 FL (ref 79–99)
MONOCYTES # BLD AUTO: 0.67 X10(3)/MCL (ref 0.24–0.36)
MONOCYTES # BLD AUTO: 1.05 X10(3)/MCL (ref 0.24–0.36)
MONOCYTES NFR BLD AUTO: 5.6 % (ref 4.7–12.5)
MONOCYTES NFR BLD AUTO: 8.3 % (ref 4.7–12.5)
NEUTROPHILS # BLD AUTO: 8.5 X10(3)/MCL (ref 1.56–6.13)
NEUTROPHILS # BLD AUTO: 8.64 X10(3)/MCL (ref 1.56–6.13)
NEUTROPHILS NFR BLD AUTO: 68.6 % (ref 37–73)
NEUTROPHILS NFR BLD AUTO: 71.5 % (ref 37–73)
NRBC BLD AUTO-RTO: 0 %
NRBC BLD AUTO-RTO: 0 %
PLATELET # BLD AUTO: 241 X10(3)/MCL (ref 140–371)
PLATELET # BLD AUTO: 356 X10(3)/MCL (ref 140–371)
PMV BLD AUTO: 9.2 FL (ref 9.4–12.4)
PMV BLD AUTO: 9.5 FL (ref 9.4–12.4)
POTASSIUM SERPL-SCNC: 3.6 MMOL/L (ref 3.5–5.1)
PROT SERPL-MCNC: 5.9 GM/DL (ref 6.3–8.2)
RBC # BLD AUTO: 3.84 X10(6)/MCL (ref 4–5.1)
RBC # BLD AUTO: 3.97 X10(6)/MCL (ref 4–5.1)
SODIUM SERPL-SCNC: 135 MMOL/L (ref 136–145)
WBC # BLD AUTO: 11.89 X10(3)/MCL (ref 4–11.5)
WBC # BLD AUTO: 12.58 X10(3)/MCL (ref 4–11.5)

## 2024-11-19 PROCEDURE — 36415 COLL VENOUS BLD VENIPUNCTURE: CPT | Performed by: OBSTETRICS & GYNECOLOGY

## 2024-11-19 PROCEDURE — 85025 COMPLETE CBC W/AUTO DIFF WBC: CPT | Performed by: OBSTETRICS & GYNECOLOGY

## 2024-11-19 PROCEDURE — 25000003 PHARM REV CODE 250: Performed by: OBSTETRICS & GYNECOLOGY

## 2024-11-19 PROCEDURE — 11000001 HC ACUTE MED/SURG PRIVATE ROOM

## 2024-11-19 PROCEDURE — 83735 ASSAY OF MAGNESIUM: CPT | Performed by: OBSTETRICS & GYNECOLOGY

## 2024-11-19 PROCEDURE — 99231 SBSQ HOSP IP/OBS SF/LOW 25: CPT | Mod: ,,, | Performed by: OBSTETRICS & GYNECOLOGY

## 2024-11-19 PROCEDURE — 63600175 PHARM REV CODE 636 W HCPCS: Mod: JZ,JG | Performed by: OBSTETRICS & GYNECOLOGY

## 2024-11-19 PROCEDURE — 80053 COMPREHEN METABOLIC PANEL: CPT | Performed by: OBSTETRICS & GYNECOLOGY

## 2024-11-19 RX ORDER — LABETALOL 200 MG/1
200 TABLET, FILM COATED ORAL EVERY 12 HOURS
Status: DISCONTINUED | OUTPATIENT
Start: 2024-11-19 | End: 2024-11-19

## 2024-11-19 RX ORDER — LABETALOL HCL 20 MG/4 ML
80 SYRINGE (ML) INTRAVENOUS ONCE AS NEEDED
Status: DISCONTINUED | OUTPATIENT
Start: 2024-11-19 | End: 2024-11-21 | Stop reason: HOSPADM

## 2024-11-19 RX ORDER — MAGNESIUM SULFATE HEPTAHYDRATE 40 MG/ML
2 INJECTION, SOLUTION INTRAVENOUS CONTINUOUS
Status: DISCONTINUED | OUTPATIENT
Start: 2024-11-19 | End: 2024-11-20

## 2024-11-19 RX ORDER — SODIUM CHLORIDE, SODIUM LACTATE, POTASSIUM CHLORIDE, CALCIUM CHLORIDE 600; 310; 30; 20 MG/100ML; MG/100ML; MG/100ML; MG/100ML
INJECTION, SOLUTION INTRAVENOUS CONTINUOUS
Status: DISCONTINUED | OUTPATIENT
Start: 2024-11-19 | End: 2024-11-21

## 2024-11-19 RX ORDER — MAGNESIUM SULFATE HEPTAHYDRATE 40 MG/ML
2 INJECTION, SOLUTION INTRAVENOUS
Status: DISCONTINUED | OUTPATIENT
Start: 2024-11-19 | End: 2024-11-21 | Stop reason: HOSPADM

## 2024-11-19 RX ORDER — MAGNESIUM SULFATE HEPTAHYDRATE 40 MG/ML
6 INJECTION, SOLUTION INTRAVENOUS ONCE
Status: COMPLETED | OUTPATIENT
Start: 2024-11-19 | End: 2024-11-19

## 2024-11-19 RX ORDER — CALCIUM GLUCONATE 98 MG/ML
1 INJECTION, SOLUTION INTRAVENOUS
Status: DISCONTINUED | OUTPATIENT
Start: 2024-11-19 | End: 2024-11-21 | Stop reason: HOSPADM

## 2024-11-19 RX ORDER — LABETALOL HCL 20 MG/4 ML
40 SYRINGE (ML) INTRAVENOUS ONCE AS NEEDED
Status: COMPLETED | OUTPATIENT
Start: 2024-11-19 | End: 2024-11-19

## 2024-11-19 RX ORDER — HYDRALAZINE HYDROCHLORIDE 20 MG/ML
10 INJECTION INTRAMUSCULAR; INTRAVENOUS ONCE AS NEEDED
Status: DISCONTINUED | OUTPATIENT
Start: 2024-11-19 | End: 2024-11-21 | Stop reason: HOSPADM

## 2024-11-19 RX ORDER — LABETALOL HCL 20 MG/4 ML
20 SYRINGE (ML) INTRAVENOUS ONCE AS NEEDED
Status: COMPLETED | OUTPATIENT
Start: 2024-11-19 | End: 2024-11-19

## 2024-11-19 RX ADMIN — MAGNESIUM SULFATE HEPTAHYDRATE 2 G/HR: 40 INJECTION, SOLUTION INTRAVENOUS at 02:11

## 2024-11-19 RX ADMIN — IBUPROFEN 600 MG: 600 TABLET, FILM COATED ORAL at 08:11

## 2024-11-19 RX ADMIN — LABETALOL HYDROCHLORIDE 300 MG: 200 TABLET, FILM COATED ORAL at 02:11

## 2024-11-19 RX ADMIN — FERROUS SULFATE TAB 325 MG (65 MG ELEMENTAL FE) 1 EACH: 325 (65 FE) TAB at 07:11

## 2024-11-19 RX ADMIN — LABETALOL HYDROCHLORIDE 200 MG: 200 TABLET, FILM COATED ORAL at 07:11

## 2024-11-19 RX ADMIN — LABETALOL HYDROCHLORIDE 20 MG: 5 INJECTION, SOLUTION INTRAVENOUS at 12:11

## 2024-11-19 RX ADMIN — PRENATAL VITAMINS-IRON FUMARATE 27 MG IRON-FOLIC ACID 0.8 MG TABLET 1 TABLET: at 07:11

## 2024-11-19 RX ADMIN — LABETALOL HYDROCHLORIDE 40 MG: 5 INJECTION, SOLUTION INTRAVENOUS at 01:11

## 2024-11-19 RX ADMIN — LABETALOL HYDROCHLORIDE 300 MG: 200 TABLET, FILM COATED ORAL at 09:11

## 2024-11-19 RX ADMIN — MAGNESIUM SULFATE HEPTAHYDRATE 4 G: 40 INJECTION, SOLUTION INTRAVENOUS at 02:11

## 2024-11-19 NOTE — PROGRESS NOTES
PostPartum Progress Note        Subjective:      Post-Partum Day #1 after uncomplicated vaginal delivery.    Patient is without complaints. Lochia decreasing. Breast feeding. Pain is well controlled. Patient is ambulating. Tolerating Full Regular diet. Overall mother and baby are doing well.     Objective:      Temp:  [96.8 °F (36 °C)-98.3 °F (36.8 °C)] 98.1 °F (36.7 °C)  Pulse:  [] 78  Resp:  [17-20] 18  SpO2:  [98 %-100 %] 100 %  BP: (128-165)/() 148/99  No intake or output data in the 24 hours ending 24 0902  Body mass index is 43.11 kg/m².  Vitals:    24 2325 24 0400 24 0756 24 0800   BP: 139/86 (!) 151/100 (!) 148/99    Pulse: 78 74 76 78   Resp:    Temp: 97.8 °F (36.6 °C) 98 °F (36.7 °C)  98.1 °F (36.7 °C)   TempSrc:    Oral   SpO2:       Weight:       Height:           General: no acute distress  Abdomen: soft, non-tender, non-distended; Fundus firm and at the umbilicus    Extremities: non-tender, symmetric, trace edema    Lab Results   Component Value Date/Time    GROUPTRH A POS 2024 11:18 AM    ABORH A POS 2024 12:13 PM    ABSCREEN NEG 2024 12:13 PM     Recent Results (from the past 2 weeks)   CBC with Differential    Collection Time: 24  5:30 AM   Result Value Ref Range    WBC 12.58 (H) 4.00 - 11.50 x10(3)/mcL    Hgb 10.1 (L) 11.8 - 16.0 g/dL    Hct 30.1 (L) 36.0 - 48.0 %    Platelet 356 140 - 371 x10(3)/mcL   CBC with Differential    Collection Time: 24 11:18 AM   Result Value Ref Range    WBC 10.22 4.00 - 11.50 x10(3)/mcL    Hgb 10.8 (L) 11.8 - 16.0 g/dL    Hct 33.0 (L) 36.0 - 48.0 %    Platelet 376 (H) 140 - 371 x10(3)/mcL   CBC with Differential    Collection Time: 24 10:56 AM   Result Value Ref Range    WBC 11.31 4.00 - 11.50 x10(3)/mcL    Hgb 11.0 (L) 11.8 - 16.0 g/dL    Hct 33.1 (L) 36.0 - 48.0 %    Platelet 458 (H) 140 - 371 x10(3)/mcL          Assessment:     29 y.o.  S/P  Post-Partum Day #1  - Doing  Well   Active Hospital Problems    Diagnosis  POA    *37 weeks gestation of pregnancy [Z3A.37]  Not Applicable    HTN (hypertension) [I10]  Yes      Resolved Hospital Problems   No resolved problems to display.          Plan:     1. Continue routine postpartum care  2. Plan for D/C in AM  3. Breast Feeding encouraged, lactation consult  4. Rhogam per protocol  5. CHTN: start Labetalol 200mg BID, cont to monitor BP, prec given

## 2024-11-20 LAB
MAGNESIUM SERPL-MCNC: 4 MG/DL (ref 1.8–2.4)
MAGNESIUM SERPL-MCNC: 5 MG/DL (ref 1.8–2.4)
MAGNESIUM SERPL-MCNC: 5.6 MG/DL (ref 1.8–2.4)

## 2024-11-20 PROCEDURE — 11000001 HC ACUTE MED/SURG PRIVATE ROOM

## 2024-11-20 PROCEDURE — 36415 COLL VENOUS BLD VENIPUNCTURE: CPT | Performed by: OBSTETRICS & GYNECOLOGY

## 2024-11-20 PROCEDURE — 83735 ASSAY OF MAGNESIUM: CPT | Performed by: OBSTETRICS & GYNECOLOGY

## 2024-11-20 PROCEDURE — 99231 SBSQ HOSP IP/OBS SF/LOW 25: CPT | Mod: ,,,

## 2024-11-20 PROCEDURE — 25000003 PHARM REV CODE 250: Performed by: OBSTETRICS & GYNECOLOGY

## 2024-11-20 RX ORDER — OXYTOCIN-SODIUM CHLORIDE 0.9% IV SOLN 30 UNIT/500ML 30-0.9/5 UT/ML-%
30 SOLUTION INTRAVENOUS ONCE AS NEEDED
Status: DISCONTINUED | OUTPATIENT
Start: 2024-11-20 | End: 2024-11-21 | Stop reason: HOSPADM

## 2024-11-20 RX ADMIN — IBUPROFEN 600 MG: 600 TABLET, FILM COATED ORAL at 04:11

## 2024-11-20 RX ADMIN — LABETALOL HYDROCHLORIDE 300 MG: 200 TABLET, FILM COATED ORAL at 09:11

## 2024-11-20 RX ADMIN — LABETALOL HYDROCHLORIDE 300 MG: 200 TABLET, FILM COATED ORAL at 02:11

## 2024-11-20 RX ADMIN — LABETALOL HYDROCHLORIDE 300 MG: 200 TABLET, FILM COATED ORAL at 08:11

## 2024-11-20 RX ADMIN — FERROUS SULFATE TAB 325 MG (65 MG ELEMENTAL FE) 1 EACH: 325 (65 FE) TAB at 08:11

## 2024-11-20 RX ADMIN — PRENATAL VITAMINS-IRON FUMARATE 27 MG IRON-FOLIC ACID 0.8 MG TABLET 1 TABLET: at 08:11

## 2024-11-20 NOTE — PROGRESS NOTES
PostPartum Progress Note        Subjective:      Post-Partum Day #2 after uncomplicated vaginal delivery after being admitted for labor    Patient is without complaints. Lochia decreasing. Bottle feeding. Pain is well controlled. Patient is ambulating. Tolerating Full Regular diet. Overall mother and baby are doing well.     Pt's Labetalol was subsequently increased to 300mg TID and pt was started of Mag d/t and increase in BP to severe range yesterday afternoon. BP has normalized. Mag was discontinued this am per Dr. Velez's order around 0800.    Objective:      Temp:  [97.7 °F (36.5 °C)-98.1 °F (36.7 °C)] 98.1 °F (36.7 °C)  Pulse:  [75-91] 79  Resp:  [18-20] 18  SpO2:  [99 %] 99 %  BP: (111-147)/(64-94) 111/64    Intake/Output Summary (Last 24 hours) at 11/20/2024 1552  Last data filed at 11/20/2024 0800  Gross per 24 hour   Intake 1841.1 ml   Output 4100 ml   Net -2258.9 ml     Body mass index is 43.11 kg/m².    General: no acute distress  Abdomen: soft, non-tender, non-distended; Fundus firm and at the umbilicus    Extremities: non-tender, symmetric, trace edema    Lab Results   Component Value Date/Time    GROUPTRH A POS 11/18/2024 11:18 AM    ABORH A POS 05/21/2024 12:13 PM    ABSCREEN NEG 05/21/2024 12:13 PM     Recent Results (from the past 2 weeks)   CBC with Differential    Collection Time: 11/19/24  2:37 PM   Result Value Ref Range    WBC 11.89 (H) 4.00 - 11.50 x10(3)/mcL    Hgb 10.3 (L) 11.8 - 16.0 g/dL    Hct 31.3 (L) 36.0 - 48.0 %    Platelet 241 140 - 371 x10(3)/mcL   CBC with Differential    Collection Time: 11/19/24  5:30 AM   Result Value Ref Range    WBC 12.58 (H) 4.00 - 11.50 x10(3)/mcL    Hgb 10.1 (L) 11.8 - 16.0 g/dL    Hct 30.1 (L) 36.0 - 48.0 %    Platelet 356 140 - 371 x10(3)/mcL   CBC with Differential    Collection Time: 11/18/24 11:18 AM   Result Value Ref Range    WBC 10.22 4.00 - 11.50 x10(3)/mcL    Hgb 10.8 (L) 11.8 - 16.0 g/dL    Hct 33.0 (L) 36.0 - 48.0 %    Platelet 376 (H) 140 -  371 x10(3)/mcL          Assessment:     29 y.o.  S/P  Post-Partum Day #2; CHTH  - Doing Well      Plan:     1. Continue routine postpartum care  2. Plan for D/C in AM  3. Breast Feeding encouraged, lactation consult  4. Rhogam per protocol   5. CHTN: Continue Labetalol 300mg TID; Mag was stopped this am; will monitor for 24hrs.

## 2024-11-20 NOTE — NURSING
MAGNESIUM SULFATE DC'D PER DR. SHRESTHA ORDERS DUE TO NORMAL BLOOD PRESSURES. ORDERS NOTED PER DR. SHRESTHA TO CONTINUE WITH LABETALOL 300 MG PO TID.

## 2024-11-21 VITALS
RESPIRATION RATE: 18 BRPM | WEIGHT: 259.06 LBS | BODY MASS INDEX: 43.16 KG/M2 | SYSTOLIC BLOOD PRESSURE: 140 MMHG | HEART RATE: 70 BPM | OXYGEN SATURATION: 99 % | HEIGHT: 65 IN | DIASTOLIC BLOOD PRESSURE: 82 MMHG | TEMPERATURE: 98 F

## 2024-11-21 PROCEDURE — 99238 HOSP IP/OBS DSCHRG MGMT 30/<: CPT | Mod: ,,,

## 2024-11-21 PROCEDURE — 25000003 PHARM REV CODE 250: Performed by: OBSTETRICS & GYNECOLOGY

## 2024-11-21 RX ORDER — TRIPROLIDINE/PSEUDOEPHEDRINE 2.5MG-60MG
30 TABLET ORAL EVERY 6 HOURS PRN
Qty: 480 ML | Refills: 2 | Status: SHIPPED | OUTPATIENT
Start: 2024-11-21

## 2024-11-21 RX ORDER — IRON,CARBONYL/ASCORBIC ACID 100-250 MG
1 TABLET ORAL DAILY
Qty: 30 TABLET | Refills: 2 | Status: SHIPPED | OUTPATIENT
Start: 2024-11-21

## 2024-11-21 RX ORDER — LABETALOL 300 MG/1
300 TABLET, FILM COATED ORAL 3 TIMES DAILY
Qty: 90 TABLET | Refills: 11 | Status: SHIPPED | OUTPATIENT
Start: 2024-11-21 | End: 2025-11-21

## 2024-11-21 RX ADMIN — PRENATAL VITAMINS-IRON FUMARATE 27 MG IRON-FOLIC ACID 0.8 MG TABLET 1 TABLET: at 08:11

## 2024-11-21 RX ADMIN — FERROUS SULFATE TAB 325 MG (65 MG ELEMENTAL FE) 1 EACH: 325 (65 FE) TAB at 08:11

## 2024-11-21 RX ADMIN — LABETALOL HYDROCHLORIDE 300 MG: 200 TABLET, FILM COATED ORAL at 08:11

## 2024-11-21 NOTE — DISCHARGE SUMMARY
Delivery Discharge Summary  Obstetrics        Discharge Provider: DERRICK COLLINS    Admission date: 2024  Discharge date: 2024    Admit Dx:   Patient Active Problem List   Diagnosis    HTN (hypertension)    SIRS (systemic inflammatory response syndrome)    Biloma following surgery    - Abnormal findings on prenatal screening    -BMI affecting pregnancy in third trimester    - Chronic hypertension affecting pregnancy    Echogenic intracardiac focus of fetus on prenatal ultrasound    37 weeks gestation of pregnancy     (spontaneous vaginal delivery)     Discharge Dx:    Patient Active Problem List   Diagnosis    HTN (hypertension)    SIRS (systemic inflammatory response syndrome)    Biloma following surgery    - Abnormal findings on prenatal screening    -BMI affecting pregnancy in third trimester    - Chronic hypertension affecting pregnancy    Echogenic intracardiac focus of fetus on prenatal ultrasound    37 weeks gestation of pregnancy     (spontaneous vaginal delivery)         Hospital Course:  Tony Ruggiero is a 29 y.o. now  who was admitted on 2024 for delivery after presenting in labor. Patient delivered a viable . Please see delivery note for further details. Pt was in stable condition post delivery.  Her postpartum course was complicated by HTN. Pt was placed on Labetalol was subsequently increased to 300mg TID and pt was started of Mag after she had severe range pressures. BP has normalized and has been stable for 24hrs. On the date of discharge, patient's pain is controlled with oral pain medications. She is tolerating ambulation without SOB or CP, and PO diet without N/V. Reported lochia is within the normal range. Pt in stable condition and ready for discharge.       DISCHARGE PHYSICAL EXAM  Temp:  [97.9 °F (36.6 °C)-98.6 °F (37 °C)] 97.9 °F (36.6 °C)  Pulse:  [79-90] 79  Resp:  [18] 18  SpO2:  [99 %] 99 %  BP: (111-147)/() 141/94  No intake or output  data in the 24 hours ending 11/21/24 0815  Body mass index is 43.11 kg/m².    General: no acute distress  Abdomen: soft, non-tender, non-distended; Fundus firm periumbilical    Extremities: non-tender, symmetric, trace BLE edema, neg Ramon's Bilateral     Pertinent studies:  Lab Results   Component Value Date/Time    GROUPTRH A POS 11/18/2024 11:18 AM    ABORH A POS 05/21/2024 12:13 PM    ABSCREEN NEG 05/21/2024 12:13 PM     Recent Results (from the past 2 weeks)   CBC with Differential    Collection Time: 11/19/24  2:37 PM   Result Value Ref Range    WBC 11.89 (H) 4.00 - 11.50 x10(3)/mcL    Hgb 10.3 (L) 11.8 - 16.0 g/dL    Hct 31.3 (L) 36.0 - 48.0 %    Platelet 241 140 - 371 x10(3)/mcL   CBC with Differential    Collection Time: 11/19/24  5:30 AM   Result Value Ref Range    WBC 12.58 (H) 4.00 - 11.50 x10(3)/mcL    Hgb 10.1 (L) 11.8 - 16.0 g/dL    Hct 30.1 (L) 36.0 - 48.0 %    Platelet 356 140 - 371 x10(3)/mcL   CBC with Differential    Collection Time: 11/18/24 11:18 AM   Result Value Ref Range    WBC 10.22 4.00 - 11.50 x10(3)/mcL    Hgb 10.8 (L) 11.8 - 16.0 g/dL    Hct 33.0 (L) 36.0 - 48.0 %    Platelet 376 (H) 140 - 371 x10(3)/mcL         Disposition: To home, self care    Follow Up:  This Monday for BP check; 6 weeks for routine PP with Dr. Velez    Patient Instructions:   1. Call the office for any bleeding >2 pads/hour for >2 hours, temperature >100.4, pain that is uncontrolled with medications, or for any other concerns.  2. Pre Eclampsia precautions  3. No driving while on narcotics.  4. Continue Labetalol 300mg TID for HTN         Medication List        START taking these medications      ibuprofen 20 mg/mL oral liquid  Take 30 mLs (600 mg total) by mouth every 6 (six) hours as needed for Pain.     iron-vitamin C 100-250 mg (ICAR-C) 100-250 mg Tab  Commonly known as: ICAR-C  Take 1 tablet by mouth once daily.     labetaloL 300 MG tablet  Commonly known as: NORMODYNE  Take 1 tablet (300 mg total) by mouth 3  (three) times daily.            CONTINUE taking these medications      PRENATAL GUMMIES ORAL            STOP taking these medications      aspirin 81 MG Chew     prochlorperazine 5 MG tablet  Commonly known as: COMPAZINE               Where to Get Your Medications        These medications were sent to [facility]- [facility] - [place], [place] - [address]  [address] [place] [address]      Phone: [phone]   ibuprofen 20 mg/mL oral liquid  iron-vitamin C 100-250 mg (ICAR-C) 100-250 mg Tab  labetaloL 300 MG tablet            SARY MEZA

## 2024-11-26 ENCOUNTER — HOSPITAL ENCOUNTER (INPATIENT)
Facility: HOSPITAL | Age: 29
LOS: 3 days | Discharge: HOME | DRG: 776 | End: 2024-11-29
Attending: EMERGENCY MEDICINE | Admitting: OBSTETRICS & GYNECOLOGY
Payer: MEDICAID

## 2024-11-26 DIAGNOSIS — M79.605 LEFT LEG PAIN: ICD-10-CM

## 2024-11-26 DIAGNOSIS — R07.9 CHEST PAIN: ICD-10-CM

## 2024-11-26 DIAGNOSIS — I16.1 HYPERTENSIVE EMERGENCY: ICD-10-CM

## 2024-11-26 DIAGNOSIS — R79.89 ELEVATED BRAIN NATRIURETIC PEPTIDE (BNP) LEVEL: ICD-10-CM

## 2024-11-26 DIAGNOSIS — I10 SEVERE UNCONTROLLED HYPERTENSION: Primary | ICD-10-CM

## 2024-11-26 LAB
ALBUMIN SERPL-MCNC: 3.8 G/DL (ref 3.4–5)
ALBUMIN/GLOB SERPL: 1.3 RATIO
ALP SERPL-CCNC: 101 UNIT/L (ref 50–144)
ALT SERPL-CCNC: 38 UNIT/L (ref 1–45)
ANION GAP SERPL CALC-SCNC: 8 MEQ/L (ref 2–13)
AORTIC VALVE CUSP SEPERATION: 1.45 CM
ASCENDING AORTA: 2.43 CM
AST SERPL-CCNC: 35 UNIT/L (ref 14–36)
AV INDEX (PROSTH): 0.81
AV MEAN GRADIENT: 5.3 MMHG
AV PEAK GRADIENT: 9 MMHG
AV VALVE AREA BY VELOCITY RATIO: 2.5 CM²
AV VALVE AREA: 2.5 CM²
AV VELOCITY RATIO: 0.8
BASOPHILS # BLD AUTO: 0.02 X10(3)/MCL (ref 0.01–0.08)
BASOPHILS NFR BLD AUTO: 0.3 % (ref 0.1–1.2)
BILIRUB SERPL-MCNC: 0.7 MG/DL (ref 0–1)
BNP BLD-MCNC: 1190 PG/ML (ref 0–124.9)
BSA FOR ECHO PROCEDURE: 2.19 M2
BUN SERPL-MCNC: 6 MG/DL (ref 7–20)
CALCIUM SERPL-MCNC: 9.6 MG/DL (ref 8.4–10.2)
CHLORIDE SERPL-SCNC: 111 MMOL/L (ref 98–110)
CO2 SERPL-SCNC: 23 MMOL/L (ref 21–32)
CREAT SERPL-MCNC: 0.72 MG/DL (ref 0.66–1.25)
CREAT UR-MCNC: 25.4 MG/DL
CREAT/UREA NIT SERPL: 8 (ref 12–20)
CV ECHO LV RWT: 1.19 CM
DOP CALC AO PEAK VEL: 1.5 M/S
DOP CALC AO VTI: 32.6 CM
DOP CALC LVOT AREA: 3.1 CM2
DOP CALC LVOT DIAMETER: 2 CM
DOP CALC LVOT PEAK VEL: 1.2 M/S
DOP CALC LVOT STROKE VOLUME: 82.6 CM3
DOP CALCLVOT PEAK VEL VTI: 26.3 CM
E WAVE DECELERATION TIME: 10 MSEC
ECHO LV POSTERIOR WALL: 1.9 CM (ref 0.6–1.1)
EOSINOPHIL # BLD AUTO: 0.24 X10(3)/MCL (ref 0.04–0.36)
EOSINOPHIL NFR BLD AUTO: 3.5 % (ref 0.7–7)
ERYTHROCYTE [DISTWIDTH] IN BLOOD BY AUTOMATED COUNT: 15.2 % (ref 11–14.5)
FRACTIONAL SHORTENING: 18.8 % (ref 28–44)
GFR SERPLBLD CREATININE-BSD FMLA CKD-EPI: >90 ML/MIN/1.73/M2
GLOBULIN SER-MCNC: 3 GM/DL (ref 2–3.9)
GLUCOSE SERPL-MCNC: 94 MG/DL (ref 70–115)
HCT VFR BLD AUTO: 37.5 % (ref 36–48)
HGB BLD-MCNC: 12.4 G/DL (ref 11.8–16)
IMM GRANULOCYTES # BLD AUTO: 0.04 X10(3)/MCL (ref 0–0.03)
IMM GRANULOCYTES NFR BLD AUTO: 0.6 % (ref 0–0.5)
INFERIOR VENA CAVA SIZE SNIFF: 0.5 CM
INTERVENTRICULAR SEPTUM: 1.4 CM (ref 0.6–1.1)
IVC DIAMETER: 1.4 CM
LEFT ATRIUM SIZE: 3.34 CM
LEFT ATRIUM VOLUME INDEX MOD: 22.6 ML/M2
LEFT ATRIUM VOLUME MOD: 47.5 ML
LEFT INTERNAL DIMENSION IN SYSTOLE: 2.6 CM (ref 2.1–4)
LEFT VENTRICLE DIASTOLIC VOLUME INDEX: 19.67 ML/M2
LEFT VENTRICLE DIASTOLIC VOLUME: 41.3 ML
LEFT VENTRICLE END SYSTOLIC VOLUME APICAL 2 CHAMBER: 39 ML
LEFT VENTRICLE END SYSTOLIC VOLUME APICAL 4 CHAMBER: 47.6 ML
LEFT VENTRICLE MASS INDEX: 96.1 G/M2
LEFT VENTRICLE SYSTOLIC VOLUME INDEX: 12 ML/M2
LEFT VENTRICLE SYSTOLIC VOLUME: 25.3 ML
LEFT VENTRICULAR INTERNAL DIMENSION IN DIASTOLE: 3.2 CM (ref 3.5–6)
LEFT VENTRICULAR MASS: 201.8 G
LVED V (TEICH): 41.3 ML
LVES V (TEICH): 25.3 ML
LVOT MG: 2.8 MMHG
LVOT MV: 0.76 CM/S
LYMPHOCYTES # BLD AUTO: 2.1 X10(3)/MCL (ref 1.16–3.74)
LYMPHOCYTES NFR BLD AUTO: 30.7 % (ref 20–55)
MAGNESIUM SERPL-MCNC: 4.4 MG/DL (ref 1.8–2.4)
MAGNESIUM SERPL-MCNC: 5.5 MG/DL (ref 1.8–2.4)
MCH RBC QN AUTO: 25.9 PG (ref 27–34)
MCHC RBC AUTO-ENTMCNC: 33.1 G/DL (ref 31–37)
MCV RBC AUTO: 78.5 FL (ref 79–99)
MONOCYTES # BLD AUTO: 0.42 X10(3)/MCL (ref 0.24–0.36)
MONOCYTES NFR BLD AUTO: 6.1 % (ref 4.7–12.5)
MV PEAK A VEL: 0.8 M/S
NEUTROPHILS # BLD AUTO: 4.02 X10(3)/MCL (ref 1.56–6.13)
NEUTROPHILS NFR BLD AUTO: 58.8 % (ref 37–73)
NRBC BLD AUTO-RTO: 0 %
OHS CV RV/LV RATIO: 1.22 CM
PISA TR MAX VEL: 1.09 M/S
PLATELET # BLD AUTO: 549 X10(3)/MCL (ref 140–371)
PMV BLD AUTO: 8.7 FL (ref 9.4–12.4)
POTASSIUM SERPL-SCNC: 3.8 MMOL/L (ref 3.5–5.1)
PROT SERPL-MCNC: 6.8 GM/DL (ref 6.3–8.2)
PROT UR STRIP-MCNC: 15 MG/DL
RA MAJOR: 5.1 CM
RA PRESSURE ESTIMATED: 3 MMHG
RBC # BLD AUTO: 4.78 X10(6)/MCL (ref 4–5.1)
RIGHT ATRIAL AREA: 15.7 CM2
RIGHT ATRIUM VOLUME AREA LENGTH APICAL 4 CHAMBER: 39 ML
RIGHT VENTRICLE DIASTOLIC BASEL DIMENSION: 3.9 CM
RV TB RVSP: 4 MMHG
SODIUM SERPL-SCNC: 142 MMOL/L (ref 136–145)
TDI LATERAL: 0.16 M/S
TDI SEPTAL: 0.18 M/S
TDI: 0.17 M/S
TR MAX PG: 5 MMHG
TRICUSPID ANNULAR PLANE SYSTOLIC EXCURSION: 1.94 CM
TROPONIN I SERPL-MCNC: <0.012 NG/ML (ref 0–0.03)
TV REST PULMONARY ARTERY PRESSURE: 8 MMHG
URINE PROTEIN/CREATININE RATIO (OLG): 0.6
WBC # BLD AUTO: 6.84 X10(3)/MCL (ref 4–11.5)
Z-SCORE OF LEFT VENTRICULAR DIMENSION IN END DIASTOLE: -7.13
Z-SCORE OF LEFT VENTRICULAR DIMENSION IN END SYSTOLE: -3.38

## 2024-11-26 PROCEDURE — 99291 CRITICAL CARE FIRST HOUR: CPT

## 2024-11-26 PROCEDURE — G0378 HOSPITAL OBSERVATION PER HR: HCPCS

## 2024-11-26 PROCEDURE — 82570 ASSAY OF URINE CREATININE: CPT | Performed by: EMERGENCY MEDICINE

## 2024-11-26 PROCEDURE — 96376 TX/PRO/DX INJ SAME DRUG ADON: CPT

## 2024-11-26 PROCEDURE — 25500020 PHARM REV CODE 255: Performed by: EMERGENCY MEDICINE

## 2024-11-26 PROCEDURE — 63600175 PHARM REV CODE 636 W HCPCS: Performed by: EMERGENCY MEDICINE

## 2024-11-26 PROCEDURE — 96375 TX/PRO/DX INJ NEW DRUG ADDON: CPT

## 2024-11-26 PROCEDURE — 36415 COLL VENOUS BLD VENIPUNCTURE: CPT | Mod: 91 | Performed by: OBSTETRICS & GYNECOLOGY

## 2024-11-26 PROCEDURE — 83880 ASSAY OF NATRIURETIC PEPTIDE: CPT | Performed by: EMERGENCY MEDICINE

## 2024-11-26 PROCEDURE — 84484 ASSAY OF TROPONIN QUANT: CPT | Performed by: EMERGENCY MEDICINE

## 2024-11-26 PROCEDURE — 93005 ELECTROCARDIOGRAM TRACING: CPT

## 2024-11-26 PROCEDURE — 85025 COMPLETE CBC W/AUTO DIFF WBC: CPT | Performed by: EMERGENCY MEDICINE

## 2024-11-26 PROCEDURE — 25000003 PHARM REV CODE 250: Performed by: OBSTETRICS & GYNECOLOGY

## 2024-11-26 PROCEDURE — 80053 COMPREHEN METABOLIC PANEL: CPT | Performed by: EMERGENCY MEDICINE

## 2024-11-26 PROCEDURE — 25000003 PHARM REV CODE 250: Performed by: EMERGENCY MEDICINE

## 2024-11-26 PROCEDURE — 96374 THER/PROPH/DIAG INJ IV PUSH: CPT

## 2024-11-26 PROCEDURE — 63600175 PHARM REV CODE 636 W HCPCS: Performed by: OBSTETRICS & GYNECOLOGY

## 2024-11-26 PROCEDURE — 83735 ASSAY OF MAGNESIUM: CPT | Performed by: OBSTETRICS & GYNECOLOGY

## 2024-11-26 PROCEDURE — 93010 ELECTROCARDIOGRAM REPORT: CPT | Mod: ,,, | Performed by: INTERNAL MEDICINE

## 2024-11-26 PROCEDURE — 11000001 HC ACUTE MED/SURG PRIVATE ROOM

## 2024-11-26 RX ORDER — NIFEDIPINE 10 MG/1
10 CAPSULE ORAL ONCE
Status: COMPLETED | OUTPATIENT
Start: 2024-11-26 | End: 2024-11-26

## 2024-11-26 RX ORDER — MAGNESIUM SULFATE HEPTAHYDRATE 40 MG/ML
2 INJECTION, SOLUTION INTRAVENOUS CONTINUOUS
Status: DISCONTINUED | OUTPATIENT
Start: 2024-11-26 | End: 2024-11-27

## 2024-11-26 RX ORDER — IBUPROFEN 600 MG/1
600 TABLET ORAL EVERY 6 HOURS PRN
Status: DISCONTINUED | OUTPATIENT
Start: 2024-11-26 | End: 2024-11-29 | Stop reason: HOSPADM

## 2024-11-26 RX ORDER — LABETALOL HYDROCHLORIDE 5 MG/ML
20 INJECTION, SOLUTION INTRAVENOUS
Status: COMPLETED | OUTPATIENT
Start: 2024-11-26 | End: 2024-11-26

## 2024-11-26 RX ORDER — HYDRALAZINE HYDROCHLORIDE 20 MG/ML
10 INJECTION INTRAMUSCULAR; INTRAVENOUS
Status: COMPLETED | OUTPATIENT
Start: 2024-11-26 | End: 2024-11-26

## 2024-11-26 RX ORDER — SODIUM CHLORIDE, SODIUM LACTATE, POTASSIUM CHLORIDE, CALCIUM CHLORIDE 600; 310; 30; 20 MG/100ML; MG/100ML; MG/100ML; MG/100ML
INJECTION, SOLUTION INTRAVENOUS CONTINUOUS
Status: DISCONTINUED | OUTPATIENT
Start: 2024-11-26 | End: 2024-11-27

## 2024-11-26 RX ORDER — LABETALOL HYDROCHLORIDE 5 MG/ML
80 INJECTION, SOLUTION INTRAVENOUS
Status: COMPLETED | OUTPATIENT
Start: 2024-11-26 | End: 2024-11-26

## 2024-11-26 RX ORDER — LABETALOL HYDROCHLORIDE 5 MG/ML
40 INJECTION, SOLUTION INTRAVENOUS
Status: DISCONTINUED | OUTPATIENT
Start: 2024-11-26 | End: 2024-11-26

## 2024-11-26 RX ORDER — SODIUM CHLORIDE 0.9 % (FLUSH) 0.9 %
10 SYRINGE (ML) INJECTION
Status: DISCONTINUED | OUTPATIENT
Start: 2024-11-26 | End: 2024-11-29 | Stop reason: HOSPADM

## 2024-11-26 RX ORDER — ACETAMINOPHEN 500 MG
1000 TABLET ORAL
Status: COMPLETED | OUTPATIENT
Start: 2024-11-26 | End: 2024-11-26

## 2024-11-26 RX ORDER — NIFEDIPINE 30 MG/1
30 TABLET, EXTENDED RELEASE ORAL ONCE
Status: DISCONTINUED | OUTPATIENT
Start: 2024-11-27 | End: 2024-11-27

## 2024-11-26 RX ORDER — MAGNESIUM SULFATE HEPTAHYDRATE 40 MG/ML
4 INJECTION, SOLUTION INTRAVENOUS
Status: COMPLETED | OUTPATIENT
Start: 2024-11-26 | End: 2024-11-26

## 2024-11-26 RX ORDER — LABETALOL HYDROCHLORIDE 5 MG/ML
40 INJECTION, SOLUTION INTRAVENOUS
Status: COMPLETED | OUTPATIENT
Start: 2024-11-26 | End: 2024-11-26

## 2024-11-26 RX ADMIN — IOHEXOL 75 ML: 350 INJECTION, SOLUTION INTRAVENOUS at 01:11

## 2024-11-26 RX ADMIN — LABETALOL HYDROCHLORIDE 80 MG: 5 INJECTION, SOLUTION INTRAVENOUS at 04:11

## 2024-11-26 RX ADMIN — MAGNESIUM SULFATE HEPTAHYDRATE 2 G/HR: 40 INJECTION, SOLUTION INTRAVENOUS at 05:11

## 2024-11-26 RX ADMIN — ACETAMINOPHEN 1000 MG: 500 TABLET, FILM COATED ORAL at 04:11

## 2024-11-26 RX ADMIN — MAGNESIUM SULFATE HEPTAHYDRATE 2 G/HR: 40 INJECTION, SOLUTION INTRAVENOUS at 02:11

## 2024-11-26 RX ADMIN — LABETALOL HYDROCHLORIDE 40 MG: 5 INJECTION, SOLUTION INTRAVENOUS at 03:11

## 2024-11-26 RX ADMIN — SODIUM CHLORIDE, POTASSIUM CHLORIDE, SODIUM LACTATE AND CALCIUM CHLORIDE: 600; 310; 30; 20 INJECTION, SOLUTION INTRAVENOUS at 05:11

## 2024-11-26 RX ADMIN — LABETALOL HYDROCHLORIDE 300 MG: 100 TABLET, FILM COATED ORAL at 06:11

## 2024-11-26 RX ADMIN — HYDRALAZINE HYDROCHLORIDE 10 MG: 20 INJECTION INTRAMUSCULAR; INTRAVENOUS at 01:11

## 2024-11-26 RX ADMIN — MAGNESIUM SULFATE HEPTAHYDRATE 4 G: 40 INJECTION, SOLUTION INTRAVENOUS at 01:11

## 2024-11-26 RX ADMIN — NIFEDIPINE 10 MG: 10 CAPSULE ORAL at 04:11

## 2024-11-26 RX ADMIN — LABETALOL HYDROCHLORIDE 20 MG: 5 INJECTION, SOLUTION INTRAVENOUS at 02:11

## 2024-11-26 RX ADMIN — IBUPROFEN 600 MG: 600 TABLET, FILM COATED ORAL at 10:11

## 2024-11-26 RX ADMIN — LABETALOL HYDROCHLORIDE 20 MG: 5 INJECTION, SOLUTION INTRAVENOUS at 03:11

## 2024-11-26 NOTE — ED PROVIDER NOTES
Encounter Date: 11/26/2024       History     Chief Complaint   Patient presents with    Chest Pain     Complains of chest pain x 3 days with sob and htn. States she is compliant with her labetalol tid. States she is 1 week post partum, sees Dr. Velez.     Patient is a 29-year-old female 1 week postpartum who presents to the emergency department with a chief complaint of chest pain and shortness of breath.  Symptoms are worse when she lies down flat.  Associated symptoms also include swelling to the posterior aspect of her left knee.  She states that she had high blood pressure postpartum in the hospital.  That is when they started her on antihypertensives.  She reports compliance with her labetalol 300 t.i.d..  She last took it 6:00 a.m. this morning.  Elevated blood pressures at home.  No reports of any headaches, vision changes, fevers, seizure      Review of patient's allergies indicates:  No Known Allergies  Past Medical History:   Diagnosis Date    Gestational hypertension      Past Surgical History:   Procedure Laterality Date    LAPAROSCOPIC CHOLECYSTECTOMY N/A 04/13/2023    Procedure: CHOLECYSTECTOMY, LAPAROSCOPIC;  Surgeon: Екатерина Krishna MD;  Location: [facility];  Service: General;  Laterality: N/A;     Family History   Problem Relation Name Age of Onset    Hypertension Mother Betty new     Breast cancer Neg Hx      Ovarian cancer Neg Hx      Colon cancer Neg Hx      Uterine cancer Neg Hx      Cervical cancer Neg Hx       Social History     Tobacco Use    Smoking status: Never     Passive exposure: Never    Smokeless tobacco: Never   Substance Use Topics    Alcohol use: Never    Drug use: Never     Review of Systems   Respiratory:  Positive for cough and shortness of breath.    Cardiovascular:  Positive for chest pain and leg swelling (Left posterior knee).   All other systems reviewed and are negative.      Physical Exam     Initial Vitals [11/26/24 1239]   BP Pulse Resp Temp SpO2   (!) 211/134 92 18  98.5 °F (36.9 °C) 99 %      MAP       --         Physical Exam    Nursing note and vitals reviewed.  Constitutional: No distress.   Increased BMI   HENT:   Head: Normocephalic and atraumatic. Mouth/Throat: Oropharynx is clear and moist.   Eyes: Conjunctivae and EOM are normal. Pupils are equal, round, and reactive to light.   Neck: Neck supple. No tracheal deviation present.   Cardiovascular:  Normal rate, regular rhythm, normal heart sounds and intact distal pulses.           Pulmonary/Chest: Breath sounds normal. No respiratory distress.   Abdominal: Abdomen is soft. She exhibits no distension. There is no abdominal tenderness. There is no rebound and no guarding.   Musculoskeletal:         General: No tenderness. Normal range of motion.      Cervical back: Neck supple.      Comments: Swelling to the left knee posterior fossa     Neurological: She is alert and oriented to person, place, and time. GCS score is 15. GCS eye subscore is 4. GCS verbal subscore is 5. GCS motor subscore is 6.   No focal deficits   Skin: Skin is warm. No rash noted. No erythema.   Psychiatric: She has a normal mood and affect. Her behavior is normal.         ED Course   Critical Care    Date/Time: 11/26/2024 12:30 PM    Performed by: Yannick Douglas MD  Authorized by: Yannick Douglas MD  Direct patient critical care time: 45 minutes  Additional history critical care time: 10 minutes  Ordering / reviewing critical care time: 10 minutes  Documentation critical care time: 10 minutes  Consulting other physicians critical care time: 15 minutes  Total critical care time (exclusive of procedural time) : 90 minutes  Critical care time was exclusive of separately billable procedures and treating other patients and teaching time.  Critical care was necessary to treat or prevent imminent or life-threatening deterioration of the following conditions: Severe uncontrolled postpartum hypertension requiring treatment with multiple IV  antihypertensives.  Critical care was time spent personally by me on the following activities: blood draw for specimens, development of treatment plan with patient or surrogate, discussions with consultants, interpretation of cardiac output measurements, evaluation of patient's response to treatment, examination of patient, obtaining history from patient or surrogate, ordering and performing treatments and interventions, ordering and review of laboratory studies, ordering and review of radiographic studies, re-evaluation of patient's condition, pulse oximetry and review of old charts.        Labs Reviewed   COMPREHENSIVE METABOLIC PANEL - Abnormal       Result Value    Sodium 142      Potassium 3.8      Chloride 111 (*)     CO2 23      Glucose 94      Blood Urea Nitrogen 6 (*)     Creatinine 0.72      Calcium 9.6      Protein Total 6.8      Albumin 3.8      Globulin 3.0      Albumin/Globulin Ratio 1.3      Bilirubin Total 0.7            ALT 38      AST 35      eGFR >90      Anion Gap 8.0      BUN/Creatinine Ratio 8 (*)    NT-PRO NATRIURETIC PEPTIDE - Abnormal    ProBNP 1,190.0 (*)    CBC WITH DIFFERENTIAL - Abnormal    WBC 6.84      RBC 4.78      Hgb 12.4      Hct 37.5      MCV 78.5 (*)     MCH 25.9 (*)     MCHC 33.1      RDW 15.2 (*)     Platelet 549 (*)     MPV 8.7 (*)     Neut % 58.8      Lymph % 30.7      Mono % 6.1      Eos % 3.5      Basophil % 0.3      Lymph # 2.10      Neut # 4.02      Mono # 0.42 (*)     Eos # 0.24      Baso # 0.02      IG# 0.04 (*)     IG% 0.6 (*)     NRBC% 0.0     TROPONIN I - Normal    Troponin-I <0.012     CBC W/ AUTO DIFFERENTIAL    Narrative:     The following orders were created for panel order CBC auto differential.  Procedure                               Abnormality         Status                     ---------                               -----------         ------                     CBC with Differential[[id number]]       Abnormal            Final result                  Please view results for these tests on the individual orders.   PROTEIN / CREATININE RATIO, URINE    Urine Protein Level 15.0      Urine Creatinine 25.4      Urine Protein/Creatinine Ratio 0.6       EKG Readings: (Independently Interpreted)   EKG reviewed interpreted by ED provider as normal sinus rhythm with a rate of 85, normal axis, normal intervals, normal ST-T segments       Imaging Results              CTA Chest Non-Coronary (PE Studies) (Final result)  Result time 11/26/24 14:05:26      Final result by Abdias Bill MD (11/26/24 14:05:26)                   Impression:      No pulmonary embolus seen.    Small right-sided pleural effusion.  Lungs otherwise clear.      Electronically signed by: Sandrine Bill MD  Date:    11/26/2024  Time:    14:05               Narrative:    EXAMINATION:  CTA CHEST NON CORONARY (PE STUDIES)    CLINICAL HISTORY:  Pulmonary embolism (PE) suspected, high prob;    TECHNIQUE:  Low dose axial images, sagittal and coronal reformations were obtained from the thoracic inlet to the lung bases following the IV administration of 100 mL of Omnipaque 350.  Contrast timing was optimized to evaluate the pulmonary arteries.  MIP images were performed.  .  Automated exposure control used.    COMPARISON:  Radiograph same day    FINDINGS:  Pulmonary arterial system normally opacified without evidence of filling defect to indicate pulmonary embolus.  Pericardium, trachea, esophagus normal.  No mediastinal mass, lymphadenopathy, hilar enlargement.    Soft tissues of the surrounding chest wall are normal.    Visualized upper abdominal organs normal.    Small right-sided pleural fluid.  Lungs otherwise clear with no infiltrate or consolidation.                                       X-Ray Chest AP Portable (Final result)  Result time 11/26/24 13:38:40      Final result by Abdias Bill MD (11/26/24 13:38:40)                   Impression:      No acute abnormality.      Electronically  signed by: Sandrine Bill MD  Date:    11/26/2024  Time:    13:38               Narrative:    EXAMINATION:  XR CHEST AP PORTABLE    CLINICAL HISTORY:  dyspnea;    TECHNIQUE:  Single frontal view of the chest was performed.    COMPARISON:  06/18/2012    FINDINGS:  The lungs are clear, with normal appearance of pulmonary vasculature and no pleural effusion or pneumothorax.    The cardiac silhouette is normal in size. The hilar and mediastinal contours are unremarkable.    Bones are intact.                                       US Lower Extremity Veins Left (Final result)  Result time 11/26/24 13:37:58      Final result by Abdias Bill MD (11/26/24 13:37:58)                   Impression:      No evidence of deep venous thrombosis in the left lower extremity.      Electronically signed by: Sandrine Bill MD  Date:    11/26/2024  Time:    13:37               Narrative:    EXAMINATION:  US LOWER EXTREMITY VEINS LEFT    CLINICAL HISTORY:  Pain in left leg    TECHNIQUE:  Duplex and color flow Doppler evaluation and graded compression of the left lower extremity veins was performed.    COMPARISON:  None    FINDINGS:  Left thigh veins: The common femoral, femoral, popliteal, upper greater saphenous, and deep femoral veins are patent and free of thrombus. The veins are normally compressible and have normal phasic flow and augmentation response.    Left calf veins: The visualized calf veins are patent.    Contralateral CFV: The contralateral (right) common femoral vein is patent and free of thrombus.    Miscellaneous: None                                       Medications   magnesium sulfate in water 40 gram/1,000 mL (4 %) infusion (2 g/hr Intravenous New Bag 11/26/24 1408)   sodium chloride 0.9% flush 10 mL (has no administration in time range)   hydrALAZINE injection 10 mg (10 mg Intravenous Given 11/26/24 1300)   magnesium sulfate 2g in water 50mL IVPB (premix) (0 g Intravenous Stopped 11/26/24 1344)   hydrALAZINE  injection 10 mg (10 mg Intravenous Given 11/26/24 1333)   iohexoL (OMNIPAQUE 350) injection 100 mL (75 mLs Intravenous Given 11/26/24 1350)   labetaloL injection 20 mg (20 mg Intravenous Given 11/26/24 1407)   labetaloL injection 20 mg (20 mg Intravenous Given 11/26/24 1515)   labetaloL injection 40 mg (40 mg Intravenous Given 11/26/24 1541)   acetaminophen tablet 1,000 mg (1,000 mg Oral Given 11/26/24 1627)   labetaloL injection 80 mg (80 mg Intravenous Given 11/26/24 1621)     Medical Decision Making  29-year-old female presenting with severe postpartum hypertension.  She states she was diagnosed with hypertension in the postpartum.  Prior to hospital discharge.  She reports compliance with her labetalol 300 t.i.d..  She presented to the emergency department with complaints of shortness of breath, chest pain, and significantly elevated blood pressures.  Given that she reports compliance with her labetalol, and blood pressure was uncontrolled on that labetalol plus the fact that she may have new onset congestive heart failure with the hypertension, chest pain, shortness of breath, the decision was made to start with IV hydralazine.  OBGYN was consulted immediately.  Decision was made to proceed down the hydralazine 1st pregnancy/postpartum hypertension pathway.  We reached the end of that pathway, and blood pressure was still uncontrolled.  Discussed again with Dr. Velez, who recommended giving a dose of 80 mg IV labetalol and transferring to L and D for further management.  Magnesium was started in the emergency department.  Remainder of ED workup as above.    Amount and/or Complexity of Data Reviewed  External Data Reviewed: notes.     Details: Past medical history, medications, allergies reviewed  Labs: ordered. Decision-making details documented in ED Course.  Radiology: ordered and independent interpretation performed. Decision-making details documented in ED Course.  Discussion of management or test  interpretation with external provider(s): Dr. Velez with OBGYN was consulted immediately following initial H&P.. She recommended continuing blood pressure treatment, starting magnesium if creatinine within normal limits, finish cardiopulmonary workup in the emergency department including ruling out venous thromboembolism, and then admit to labor and delivery.    Dr. Velez consulted numerous times throughout patient's ED stay.  See ED course    Risk  OTC drugs.  Prescription drug management.  Drug therapy requiring intensive monitoring for toxicity.  Decision regarding hospitalization.               ED Course as of 11/26/24 1644   Tue Nov 26, 2024   1320 Blood pressure not at goal 20 minutes after 10 mg of hydralazine.  It has improved, but not at goal.  Will administer a 2nd dose of 10 mg IV.  Magnesium ordered [DP]   1509 Patient was given 2 doses of hydralazine followed by 20 mg of labetalol. Patient's blood pressure finally improved to <160/110, but BP just went up again. Will give another dose of labetalol 20 since she responded initially to that first dose of 20 [DP]   1610 Still having difficulty getting blood pressure <160/110. Currently 172/118.  We have reached the end of the 1st line IV hydralazine protocol.  She received 2 doses of hydralazine followed by 3 doses of labetalol.  Her last labetalol dose was 40 IV.  Discussed this with Dr. Velez. Dr. Velez requesting 80 IV labetalol. We will give 80 of labetalol over at least 2 minutes. Dr. Velez requesting that patient be brought to L&D now [DP]      ED Course User Index  [DP] Yannick Douglas MD          Vitals:    11/26/24 1602 11/26/24 1610 11/26/24 1621 11/26/24 1628   BP: (!) 168/116 (!) 172/118 (!) 170/120 (!) 160/112   BP Location: Left arm  Left arm Left arm   Patient Position: Sitting  Lying Lying   Pulse: 102 99 105 94   Resp: 18 17 15 10  Comment: Simultaneous filing. User may not have seen previous data.   Temp: 98.1 °F (36.7 °C)      TempSrc:  Oral      SpO2: 98% 98% 98% 99%   Weight:       Height:                          Clinical Impression:  Final diagnoses:  [R07.9] Chest pain  [M79.605] Left leg pain  [R79.89] Elevated brain natriuretic peptide (BNP) level  [I10] Severe uncontrolled hypertension (Primary)  [I16.1] Hypertensive emergency  [O16.5] Postpartum hypertension          ED Disposition Condition    Observation Yannick Carney MD  11/26/24 2956

## 2024-11-26 NOTE — H&P
HISTORY AND PHYSICAL                                                OBSTETRICS          Subjective:      Tony Ruggiero is a 29 y.o.  female with sp  24  a PMH of [place] who was placed on Labetalol 300 mg TID PP, last dose at 0600 AM today presented to [facility] ER co 3 d h/o CP/SOB. Pt noted to have BP in 200s/100s upon arrival. Pt underwent Hydralizine Protocl and then Labetaol protocol while in ER.     Currently denies CP/SOB. CV workup including DVT left LE scan, CT with PE protocol, EKG and ECHO wnl.     PMHx:   HTN      PSHx:   Past Surgical History:   Procedure Laterality Date    LAPAROSCOPIC CHOLECYSTECTOMY N/A 2023    Procedure: CHOLECYSTECTOMY, LAPAROSCOPIC;  Surgeon: Екатерина Krishna MD;  Location: [facility] OR;  Service: General;  Laterality: N/A;       All: Review of patient's allergies indicates:  No Known Allergies    Meds:   Medications Prior to Admission   Medication Sig Dispense Refill Last Dose/Taking    ibuprofen 20 mg/mL oral liquid Take 30 mLs (600 mg total) by mouth every 6 (six) hours as needed for Pain. 480 mL 2 2024    iron-vitamin C 100-250 mg, ICAR-C, (ICAR-C) 100-250 mg Tab Take 1 tablet by mouth once daily. 30 tablet 2 2024    labetaloL (NORMODYNE) 300 MG tablet Take 1 tablet (300 mg total) by mouth 3 (three) times daily. 90 tablet 11 2024    PNV no.153/FA/om3/dha/epa/fish (PRENATAL GUMMIES ORAL) Take 2 Units by mouth Daily.   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   Tobacco Use    Smoking status: Never     Passive exposure: Never    Smokeless tobacco: Never   Substance and Sexual Activity    Alcohol use: Never    Drug use: Never    Sexual activity: Yes     Partners: Male     Birth control/protection: None     Comment: STI: Chlamydia   Social History Narrative    ** Merged History Encounter **          Social Drivers of Health      Financial Resource Strain: Low Risk  (2024)    Overall Financial Resource Strain (CARDIA)     Difficulty of Paying Living Expenses: Not hard at all   Food Insecurity: No Food Insecurity (2024)    Hunger Vital Sign     Worried About Running Out of Food in the Last Year: Never true     Ran Out of Food in the Last Year: Never true   Transportation Needs: No Transportation Needs (2024)    TRANSPORTATION NEEDS     Transportation : No   Physical Activity: Inactive (2024)    Exercise Vital Sign     Days of Exercise per Week: 0 days     Minutes of Exercise per Session: 0 min   Stress: No Stress Concern Present (2024)    [employer] [place] [employer] - Occupational Stress Questionnaire     Feeling of Stress : Not at all   Housing Stability: Low Risk  (2024)    Housing Stability Vital Sign     Unable to Pay for Housing in the Last Year: No     Homeless in the Last Year: No       FH:   Family History   Problem Relation Name Age of Onset    Hypertension Mother Betty new     Breast cancer Neg Hx      Ovarian cancer Neg Hx      Colon cancer Neg Hx      Uterine cancer Neg Hx      Cervical cancer Neg Hx         OBHx:   OB History    Para Term  AB Living   4 4 4 0 0 4   SAB IAB Ectopic Multiple Live Births   0 0 0 0 4      # Outcome Date GA Lbr Jermaine/2nd Weight Sex Type Anes PTL Lv   4 Term 24 37w1d 03:48 / 00:10 3.025 kg (6 lb 10.7 oz) F Vag-Spont EPI N LOLIS      Name: Liliana Foy      Apgar1: 8  Apgar5: 9   3 Term 05/10/22 37w5d  3.062 kg (6 lb 12 oz) M Vag-Spont EPI N LOLIS      Complications: Preeclampsia, Gestational hypertension, antepartum   2 Term 20 37w6d  2.778 kg (6 lb 2 oz) F Vag-Spont EPI N LOLIS      Complications: Gestational hypertension, antepartum   1 Term 12/10/15 37w6d  2.92 kg (6 lb 7 oz) F Vag-Spont EPI N LOLIS       Objective:      Vitals:    24 0730 24 0740 24 0750 24 0800   BP:   (!) 131/93    Pulse: 78 79  101 88   Resp:       Temp:   98.3 °F (36.8 °C)    TempSrc:       SpO2: 97% 97% 97% 96%   Weight:       Height:       Constitutional: General appearance: healthy, well-nourished and well-developed   Psychiatric:  Orientation to time, place and person. Normal mood and affect and active, alert   Abdomen: Auscultation/Inspection/Palpation: No tenderness or masses. Soft, nondistended   EXT: 1+BLE reflex 2+      Recent Labs   Lab 11/26/24  1255 11/27/24  0529   HGB 12.4  --    HCT 37.5  --    *  --    AST 35 30   ALT 38 29   CREATININE 0.72 0.68       Imaging Results              CTA Chest Non-Coronary (PE Studies) (Final result)  Result time 11/26/24 14:05:26      Final result by Abdias Bill MD (11/26/24 14:05:26)                   Impression:      No pulmonary embolus seen.    Small right-sided pleural effusion.  Lungs otherwise clear.      Electronically signed by: Sandrine Bill MD  Date:    11/26/2024  Time:    14:05               Narrative:    EXAMINATION:  CTA CHEST NON CORONARY (PE STUDIES)    CLINICAL HISTORY:  Pulmonary embolism (PE) suspected, high prob;    TECHNIQUE:  Low dose axial images, sagittal and coronal reformations were obtained from the thoracic inlet to the lung bases following the IV administration of 100 mL of Omnipaque 350.  Contrast timing was optimized to evaluate the pulmonary arteries.  MIP images were performed.  .  Automated exposure control used.    COMPARISON:  Radiograph same day    FINDINGS:  Pulmonary arterial system normally opacified without evidence of filling defect to indicate pulmonary embolus.  Pericardium, trachea, esophagus normal.  No mediastinal mass, lymphadenopathy, hilar enlargement.    Soft tissues of the surrounding chest wall are normal.    Visualized upper abdominal organs normal.    Small right-sided pleural fluid.  Lungs otherwise clear with no infiltrate or consolidation.                                       X-Ray Chest AP Portable (Final  result)  Result time 24 13:38:40      Final result by Abdias Bill MD (24 13:38:40)                   Impression:      No acute abnormality.      Electronically signed by: Sandrine Bill MD  Date:    2024  Time:    13:38               Narrative:    EXAMINATION:  XR CHEST AP PORTABLE    CLINICAL HISTORY:  dyspnea;    TECHNIQUE:  Single frontal view of the chest was performed.    COMPARISON:  2012    FINDINGS:  The lungs are clear, with normal appearance of pulmonary vasculature and no pleural effusion or pneumothorax.    The cardiac silhouette is normal in size. The hilar and mediastinal contours are unremarkable.    Bones are intact.                                       US Lower Extremity Veins Left (Final result)  Result time 24 13:37:58      Final result by Abdias Bill MD (24 13:37:58)                   Impression:      No evidence of deep venous thrombosis in the left lower extremity.      Electronically signed by: Sandrine Bill MD  Date:    2024  Time:    13:37               Narrative:    EXAMINATION:  US LOWER EXTREMITY VEINS LEFT    CLINICAL HISTORY:  Pain in left leg    TECHNIQUE:  Duplex and color flow Doppler evaluation and graded compression of the left lower extremity veins was performed.    COMPARISON:  None    FINDINGS:  Left thigh veins: The common femoral, femoral, popliteal, upper greater saphenous, and deep femoral veins are patent and free of thrombus. The veins are normally compressible and have normal phasic flow and augmentation response.    Left calf veins: The visualized calf veins are patent.    Contralateral CFV: The contralateral (right) common femoral vein is patent and free of thrombus.    Miscellaneous: None                                           Assessment:     29 y.o.  sp   24    Active Hospital Problems    Diagnosis  POA    *Hypertension in pregnancy, pre-eclampsia, severe, delivered/postpartum [O14.15]  Yes     Hypertensive emergency [I16.1]  Yes      Resolved Hospital Problems   No resolved problems to display.          Plan:     1. LORNEA:pt canceled clinic fu apt yesterday. States compliant with home Labetalol 300mg TID  - advised Hydralazine and Labetalol [facility] protocol    ER IV meds  Hydralazine 10 mg IV 13:00   Hydralazine 10 mg IV 13:33  Labetalol 20mg IV 14:07  Labetalol 20mg IV 15:15  Labetalol 40mg IV  15:41  Labetalol 80mg IV 16:21  Mag started, cont protcol    Pt now on JEAN - Procardia 10mg PO x1    Strict PRE- E prec, I's &O's      2. SCDs

## 2024-11-27 PROBLEM — I16.1 HYPERTENSIVE EMERGENCY: Status: ACTIVE | Noted: 2024-11-27

## 2024-11-27 LAB
ALBUMIN SERPL-MCNC: 3.6 G/DL (ref 3.4–5)
ALBUMIN/GLOB SERPL: 1.2 RATIO
ALP SERPL-CCNC: 117 UNIT/L (ref 50–144)
ALT SERPL-CCNC: 29 UNIT/L (ref 1–45)
ANION GAP SERPL CALC-SCNC: 6 MEQ/L (ref 2–13)
AST SERPL-CCNC: 30 UNIT/L (ref 14–36)
BILIRUB SERPL-MCNC: 0.5 MG/DL (ref 0–1)
BUN SERPL-MCNC: 5 MG/DL (ref 7–20)
CALCIUM SERPL-MCNC: 7.8 MG/DL (ref 8.4–10.2)
CHLORIDE SERPL-SCNC: 105 MMOL/L (ref 98–110)
CO2 SERPL-SCNC: 24 MMOL/L (ref 21–32)
CREAT SERPL-MCNC: 0.68 MG/DL (ref 0.66–1.25)
CREAT/UREA NIT SERPL: 7 (ref 12–20)
GFR SERPLBLD CREATININE-BSD FMLA CKD-EPI: >90 ML/MIN/1.73/M2
GLOBULIN SER-MCNC: 3.1 GM/DL (ref 2–3.9)
GLUCOSE SERPL-MCNC: 99 MG/DL (ref 70–115)
MAGNESIUM SERPL-MCNC: 3 MG/DL (ref 1.8–2.4)
MAGNESIUM SERPL-MCNC: 3.6 MG/DL (ref 1.8–2.4)
MAGNESIUM SERPL-MCNC: 4.7 MG/DL (ref 1.8–2.4)
MAGNESIUM SERPL-MCNC: 6.5 MG/DL (ref 1.8–2.4)
OHS QRS DURATION: 74 MS
OHS QTC CALCULATION: 454 MS
POTASSIUM SERPL-SCNC: 3.6 MMOL/L (ref 3.5–5.1)
PROT SERPL-MCNC: 6.7 GM/DL (ref 6.3–8.2)
SODIUM SERPL-SCNC: 135 MMOL/L (ref 136–145)

## 2024-11-27 PROCEDURE — 36415 COLL VENOUS BLD VENIPUNCTURE: CPT | Performed by: OBSTETRICS & GYNECOLOGY

## 2024-11-27 PROCEDURE — 83735 ASSAY OF MAGNESIUM: CPT | Mod: 91 | Performed by: OBSTETRICS & GYNECOLOGY

## 2024-11-27 PROCEDURE — 99284 EMERGENCY DEPT VISIT MOD MDM: CPT | Mod: ,,, | Performed by: OBSTETRICS & GYNECOLOGY

## 2024-11-27 PROCEDURE — 80053 COMPREHEN METABOLIC PANEL: CPT | Performed by: OBSTETRICS & GYNECOLOGY

## 2024-11-27 PROCEDURE — 11000001 HC ACUTE MED/SURG PRIVATE ROOM

## 2024-11-27 PROCEDURE — G0378 HOSPITAL OBSERVATION PER HR: HCPCS

## 2024-11-27 PROCEDURE — 25000003 PHARM REV CODE 250: Performed by: OBSTETRICS & GYNECOLOGY

## 2024-11-27 RX ORDER — LABETALOL 300 MG/1
300 TABLET, FILM COATED ORAL EVERY 8 HOURS
Qty: 90 TABLET | Refills: 11 | Status: SHIPPED | OUTPATIENT
Start: 2024-11-27 | End: 2025-11-27

## 2024-11-27 RX ORDER — NIFEDIPINE 30 MG/1
30 TABLET, EXTENDED RELEASE ORAL EVERY 12 HOURS
Status: DISCONTINUED | OUTPATIENT
Start: 2024-11-27 | End: 2024-11-28

## 2024-11-27 RX ORDER — NIFEDIPINE 30 MG/1
30 TABLET, EXTENDED RELEASE ORAL ONCE
Status: COMPLETED | OUTPATIENT
Start: 2024-11-27 | End: 2024-11-27

## 2024-11-27 RX ORDER — NIFEDIPINE 30 MG/1
30 TABLET, EXTENDED RELEASE ORAL EVERY 12 HOURS
Qty: 60 TABLET | Refills: 11 | Status: SHIPPED | OUTPATIENT
Start: 2024-11-27 | End: 2024-11-29 | Stop reason: HOSPADM

## 2024-11-27 RX ADMIN — IBUPROFEN 600 MG: 600 TABLET, FILM COATED ORAL at 02:11

## 2024-11-27 RX ADMIN — IBUPROFEN 600 MG: 600 TABLET, FILM COATED ORAL at 08:11

## 2024-11-27 RX ADMIN — NIFEDIPINE 30 MG: 30 TABLET, FILM COATED, EXTENDED RELEASE ORAL at 08:11

## 2024-11-27 RX ADMIN — LABETALOL HYDROCHLORIDE 300 MG: 200 TABLET, FILM COATED ORAL at 05:11

## 2024-11-27 RX ADMIN — LABETALOL HYDROCHLORIDE 300 MG: 200 TABLET, FILM COATED ORAL at 09:11

## 2024-11-27 RX ADMIN — LABETALOL HYDROCHLORIDE 300 MG: 200 TABLET, FILM COATED ORAL at 01:11

## 2024-11-27 RX ADMIN — NIFEDIPINE 30 MG: 30 TABLET, FILM COATED, EXTENDED RELEASE ORAL at 03:11

## 2024-11-27 NOTE — PROGRESS NOTES
PROGRESS NOTE                                     OBSTETRICS/GYNECOLOGY    Subjective:   Tony Ruggiero was seen and examined at bedside without complaints.  Overnight patient was without acute events.   Patient denies fevers, chills, nausea, and vomiting. Tolerating regular diet. Ambulating and urinating without complications.     Objective:      Temp:  [97.7 °F (36.5 °C)-98.5 °F (36.9 °C)] 98.3 °F (36.8 °C)  Pulse:  [] 88  Resp:  [10-22] 10  SpO2:  [92 %-100 %] 96 %  BP: (119-213)/() 131/93    Intake/Output Summary (Last 24 hours) at 11/27/2024 0812  Last data filed at 11/27/2024 0700  Gross per 24 hour   Intake 1497.83 ml   Output 2530 ml   Net -1032.17 ml     Body mass index is 38.27 kg/m².    Vitals:    11/27/24 0730 11/27/24 0740 11/27/24 0750 11/27/24 0800   BP:   (!) 131/93    Pulse: 78 79 101 88   Resp:       Temp:   98.3 °F (36.8 °C)    TempSrc:       SpO2: 97% 97% 97% 96%   Weight:       Height:           Chaperone present.       Constitutional: General appearance: healthy, well-nourished and well-developed  Psychiatric:  Orientation to time, place and person. Normal mood and affect and active, alert   Abdomen:   No tenderness or masses. Soft, nondistended    Ext: trace BLE, reflex 2+      Labs:     Recent Results (from the past 2 weeks)   CBC with Differential    Collection Time: 11/26/24 12:55 PM   Result Value Ref Range    WBC 6.84 4.00 - 11.50 x10(3)/mcL    Hgb 12.4 11.8 - 16.0 g/dL    Hct 37.5 36.0 - 48.0 %    Platelet 549 (H) 140 - 371 x10(3)/mcL   CBC with Differential    Collection Time: 11/19/24  2:37 PM   Result Value Ref Range    WBC 11.89 (H) 4.00 - 11.50 x10(3)/mcL    Hgb 10.3 (L) 11.8 - 16.0 g/dL    Hct 31.3 (L) 36.0 - 48.0 %    Platelet 241 140 - 371 x10(3)/mcL   CBC with Differential    Collection Time: 11/19/24  5:30 AM   Result Value Ref Range    WBC 12.58 (H) 4.00 - 11.50 x10(3)/mcL    Hgb 10.1 (L) 11.8 - 16.0 g/dL    Hct 30.1 (L) 36.0 - 48.0 %    Platelet 356 140 - 371  x10(3)/mcL           Assessment/Plan:   Patient is a 29 y.o.Black or  [id number]  with   Patient Active Problem List   Diagnosis    HTN (hypertension)    SIRS (systemic inflammatory response syndrome)    Biloma following surgery    - Abnormal findings on prenatal screening    -BMI affecting pregnancy in third trimester    - Chronic hypertension affecting pregnancy    Echogenic intracardiac focus of fetus on prenatal ultrasound    37 weeks gestation of pregnancy     (spontaneous vaginal delivery)    Hypertension in pregnancy, pre-eclampsia, severe, delivered/postpartum    Hypertensive emergency          Plan:     1. LORENA/HTN emergency: No severe range BP over night, will increase Procardial 30XL from Qday to BID   Labetalol 300mg q 8 hrs    - advised Hydralazine and Labetalol OA protocol for severe range BP   - Strict PRE- E prec, I's &O's  - SCDs     ACUTE MEDICATIONS   ER IV meds  Hydralazine 10 mg IV 13:00   Hydralazine 10 mg IV 13:33  Labetalol 20mg IV 14:07  Labetalol 20mg IV 15:15  Labetalol 40mg IV  15:41  Labetalol 80mg IV 16:21  Mag started, cont protcol   Procardia 10mg PO x1 1500       Electronically signed by: Ilene Velez MD, 2024 at 8:12 AM

## 2024-11-27 NOTE — NURSING
1627 REPORT TAKEN FROM ER. PATIENT ON HER WAY NOW FROM ER TRANSFERRING TO OB DUE TO BP'S 200'S/100'S 1 WEEK POSTPARTUM. PATIENT WAS PLACED ON HYDRALAZINE AND LABETALOL PROTOCOL WHILE IN ER.     4680 DR. SHRESTHA AT NURSES STATION AND INFORMED OF BP /106. ORDERS NOTED TO GIVE PROCARDIA 10 MG PO X1 DOSE NOW THEN TAKE BP EVERY 20 MINUTES X1 HOUR. IF BP GOOD AFTER 30 MINUTES START PATIENT ON HER HOME MEDS OF LABETALOL 300 MG TID. NOTIFY DR. SHRESTHA WITH ANY SEVERE BLOOD PRESSURES FOR FUTURE ORDERS.

## 2024-11-28 PROCEDURE — 25000003 PHARM REV CODE 250: Performed by: OBSTETRICS & GYNECOLOGY

## 2024-11-28 PROCEDURE — 11000001 HC ACUTE MED/SURG PRIVATE ROOM

## 2024-11-28 PROCEDURE — G0378 HOSPITAL OBSERVATION PER HR: HCPCS

## 2024-11-28 RX ORDER — NIFEDIPINE 30 MG/1
60 TABLET, EXTENDED RELEASE ORAL EVERY 12 HOURS
Status: DISCONTINUED | OUTPATIENT
Start: 2024-11-28 | End: 2024-11-29 | Stop reason: HOSPADM

## 2024-11-28 RX ORDER — HYDRALAZINE HYDROCHLORIDE 25 MG/1
25 TABLET, FILM COATED ORAL
Status: DISCONTINUED | OUTPATIENT
Start: 2024-11-28 | End: 2024-11-29 | Stop reason: HOSPADM

## 2024-11-28 RX ORDER — NIFEDIPINE 30 MG/1
30 TABLET, EXTENDED RELEASE ORAL ONCE
Status: COMPLETED | OUTPATIENT
Start: 2024-11-28 | End: 2024-11-28

## 2024-11-28 RX ADMIN — LABETALOL HYDROCHLORIDE 300 MG: 200 TABLET, FILM COATED ORAL at 09:11

## 2024-11-28 RX ADMIN — LABETALOL HYDROCHLORIDE 300 MG: 200 TABLET, FILM COATED ORAL at 02:11

## 2024-11-28 RX ADMIN — HYDRALAZINE HYDROCHLORIDE 25 MG: 25 TABLET ORAL at 11:11

## 2024-11-28 RX ADMIN — LABETALOL HYDROCHLORIDE 300 MG: 200 TABLET, FILM COATED ORAL at 05:11

## 2024-11-28 RX ADMIN — NIFEDIPINE 60 MG: 30 TABLET, FILM COATED, EXTENDED RELEASE ORAL at 08:11

## 2024-11-28 RX ADMIN — NIFEDIPINE 30 MG: 30 TABLET, FILM COATED, EXTENDED RELEASE ORAL at 12:11

## 2024-11-28 RX ADMIN — HYDRALAZINE HYDROCHLORIDE 25 MG: 25 TABLET ORAL at 04:11

## 2024-11-28 NOTE — PROGRESS NOTES
[facility]  Vaginal Delivery Progress Note  Obstetrics    SUBJECTIVE:     Tony Ruggiero is a 29 y.o. female who was readmitted after delivering vaginally on 11/18/24 for CHTN.  She was discharged home on Labetalol 300 mg TID.  She presented to [facility] ER after a 3 d h/o CP/SOB. Pt noted to have BP in 200s/100s upon arrival. Pt underwent Hydralizine Protocl and then Labetaol protocol while in ER. She was placed on Magnesium sulfate seizure prophylaxis for 24 hrs and transitioned to oral meds. Currently she is without pre-e symptoms and her blood pressures are stable on Labetalol 300mg tid and Procardia 60mg XL bid.        OBJECTIVE:     Vital Signs Ranges:  Temp:  [97.7 °F (36.5 °C)-98.8 °F (37.1 °C)] 98.6 °F (37 °C)  Pulse:  [] 91  Resp:  [17-18] 17  SpO2:  [99 %] 99 %  BP: (118-155)/() 131/93    I/O (Last 24H):  No intake or output data in the 24 hours ending 11/28/24 1217    Physical Exam:  General:    alert, appears stated age, and cooperative   Lungs:  clear to auscultation bilaterally   Heart:  regular rate and rhythm, S1, S2 normal, no murmur, click, rub or gallop   Abdomen:  soft, non-tender; bowel sounds normal; no masses,  no organomegaly   Extremities:   peripheral pulses normal, no pedal edema, no clubbing or cyanosis     Lab Review:   Lab Results   Component Value Date    WBC 6.84 11/26/2024    HGB 12.4 11/26/2024    HCT 37.5 11/26/2024    MCV 78.5 (L) 11/26/2024     (H) 11/26/2024          ASSESSMENT:     Assessment:  Active Hospital Problems    Diagnosis    *Hypertension in pregnancy, pre-eclampsia, severe, delivered/postpartum    Hypertensive emergency      PLAN:     Plan:  Will monitor blood pressures for a while longer.  If are stable, will discharge home with follow up here on the labor unit for blood pressure check.

## 2024-11-29 VITALS
TEMPERATURE: 99 F | HEIGHT: 65 IN | BODY MASS INDEX: 38.31 KG/M2 | WEIGHT: 229.94 LBS | OXYGEN SATURATION: 99 % | SYSTOLIC BLOOD PRESSURE: 139 MMHG | RESPIRATION RATE: 17 BRPM | DIASTOLIC BLOOD PRESSURE: 108 MMHG | HEART RATE: 93 BPM

## 2024-11-29 PROCEDURE — 25000003 PHARM REV CODE 250: Performed by: OBSTETRICS & GYNECOLOGY

## 2024-11-29 PROCEDURE — 11000001 HC ACUTE MED/SURG PRIVATE ROOM

## 2024-11-29 RX ORDER — NIFEDIPINE 60 MG/1
60 TABLET, EXTENDED RELEASE ORAL EVERY 12 HOURS
Qty: 60 TABLET | Refills: 0 | Status: SHIPPED | OUTPATIENT
Start: 2024-11-29 | End: 2025-11-29

## 2024-11-29 RX ORDER — HYDRALAZINE HYDROCHLORIDE 25 MG/1
25 TABLET, FILM COATED ORAL EVERY 6 HOURS
Qty: 60 TABLET | Refills: 0 | Status: SHIPPED | OUTPATIENT
Start: 2024-11-29 | End: 2024-12-14

## 2024-11-29 RX ADMIN — HYDRALAZINE HYDROCHLORIDE 25 MG: 25 TABLET ORAL at 05:11

## 2024-11-29 RX ADMIN — HYDRALAZINE HYDROCHLORIDE 25 MG: 25 TABLET ORAL at 10:11

## 2024-11-29 RX ADMIN — NIFEDIPINE 60 MG: 30 TABLET, FILM COATED, EXTENDED RELEASE ORAL at 09:11

## 2024-11-29 RX ADMIN — LABETALOL HYDROCHLORIDE 300 MG: 200 TABLET, FILM COATED ORAL at 07:11

## 2024-11-29 NOTE — DISCHARGE SUMMARY
[facility]  Obstetrics  Discharge Summary      Patient Name: Tony Ruggiero  MRN: [id number]  Admission Date: 11/26/2024  Hospital Length of Stay: 0 days  Discharge Date and Time:  11/29/2024 12:10 PM  Attending Physician: Maikol Slade MD   Discharging Provider: MAIKOL SLADE MD  Primary Care Provider: Pippa, Primary Doctor    HPI: ADMITTED ON 11/26 WITH SEVERE RANGE BLOOD PRESSURES IN THE POST PARTUM PERIOD, ADMITTED FOR BP CONTROL.    * No surgery found *     Hospital Course: The patient is doing well this morning.  She offers no new complaints. She denies excessive vaginal bleeding, unequal lower extremity swelling, chest pain, and shortness of breath, She denies PIH symptoms. She is ambulating and voiding without difficulties. She has been afebrile.  She is ready to be discharge home. The patient wanted to leave against medical advice last evening but was convinced by the nursing staff to stay, to allow the oral hydralazine to work. She agreed. This morning, she states she is leaving regardless if discharged or not.   Overall, her blood pressures at in the acceptable range for discharge, save a few with diastolics above 100. She is currently taking labetalol 300mg tid, procardia XL 60mg bid, and hydralazine 25mg qid.     Vitals:    11/29/24 0714 11/29/24 0907 11/29/24 1052 11/29/24 1200   BP: (!) 133/95 126/88 (!) 145/103  Comment: pt given scheduled hydralazine (!) 139/108   Pulse: 98 90 84 93   Resp:       Temp:       TempSrc:       SpO2:       Weight:       Height:          Physical Exam  Vitals and nursing note reviewed. Exam conducted with a chaperone present.   Constitutional:       Appearance: Normal appearance.   Cardiovascular:      Rate and Rhythm: Normal rate.   Pulmonary:      Effort: Pulmonary effort is normal.   Abdominal:      General: Abdomen is flat.      Palpations: Abdomen is soft.   Genitourinary:     Comments: deferred  Musculoskeletal:         General: Normal  range of motion.   Skin:     General: Skin is warm and dry.   Neurological:      General: No focal deficit present.      Mental Status: She is alert and oriented to person, place, and time.   Psychiatric:         Mood and Affect: Mood normal.         Behavior: Behavior normal.          Lab Results   Component Value Date    WBC 6.84 11/26/2024    HGB 12.4 11/26/2024    HCT 37.5 11/26/2024    MCV 78.5 (L) 11/26/2024     (H) 11/26/2024     Final Active Diagnoses:    Diagnosis Date Noted POA    PRINCIPAL PROBLEM:  Hypertension in pregnancy, pre-eclampsia, severe, delivered/postpartum [O14.15] 11/26/2024 Yes    Hypertensive emergency [I16.1] 11/27/2024 Yes      Problems Resolved During this Admission:     Immunizations       Date Immunization Status Dose Route/Site Given by    11/21/24 1210 MMR Deleted 0.5 mL Subcutaneous/     11/21/24 1210 Tdap Deleted 0.5 mL Intramuscular/           Discharged Condition: stable    Disposition: Home or Self Care    Follow Up: patient has existing appointment with Dr. Velez on Dec 2nd, at 8:30am for blood pressure check.   Follow-up Information       Ilene Velez MD .    Specialty: Obstetrics and Gynecology  Contact information:  [address] [place] [id number] [phone]                           Patient Instructions:      Diet Adult Regular     Pelvic Rest     No dressing needed     Notify your health care provider if you experience any of the following:  temperature >100.4     Notify your health care provider if you experience any of the following:  persistent nausea and vomiting or diarrhea     Notify your health care provider if you experience any of the following:  severe uncontrolled pain     Notify your health care provider if you experience any of the following:  redness, tenderness, or signs of infection (pain, swelling, redness, odor or green/yellow discharge around incision site)     Notify your health care provider if you experience any of the following:   difficulty breathing or increased cough     Notify your health care provider if you experience any of the following:  severe persistent headache     Notify your health care provider if you experience any of the following:  worsening rash     Notify your health care provider if you experience any of the following:  persistent dizziness, light-headedness, or visual disturbances     Notify your health care provider if you experience any of the following:  increased confusion or weakness     Medications:  Current Discharge Medication List        START taking these medications    Details   hydrALAZINE (APRESOLINE) 25 MG tablet Take 1 tablet (25 mg total) by mouth every 6 (six) hours. for 60 doses  Qty: 60 tablet, Refills: 0    Comments: .      NIFEdipine (PROCARDIA-XL) 60 MG (OSM) 24 hr tablet Take 1 tablet (60 mg total) by mouth every 12 (twelve) hours.  Qty: 60 tablet, Refills: 0    Comments: .           CONTINUE these medications which have CHANGED    Details   labetaloL (NORMODYNE) 300 MG tablet Take 1 tablet (300 mg total) by mouth every 8 (eight) hours.  Qty: 90 tablet, Refills: 11    Comments: .           CONTINUE these medications which have NOT CHANGED    Details   ibuprofen 20 mg/mL oral liquid Take 30 mLs (600 mg total) by mouth every 6 (six) hours as needed for Pain.  Qty: 480 mL, Refills: 2      iron-vitamin C 100-250 mg, ICAR-C, (ICAR-C) 100-250 mg Tab Take 1 tablet by mouth once daily.  Qty: 30 tablet, Refills: 2      PNV no.153/FA/om3/dha/epa/fish (PRENATAL GUMMIES ORAL) Take 2 Units by mouth Daily.           The patient has been given strict precautions regarding signs and symptoms of elevated blood pressure and to represent to the ER if these occur.    MILADYS SLADE MD  Obstetrics  [facility]

## 2025-01-10 NOTE — PROGRESS NOTES
Chief Complaint:  Postpartum Care    History of Present Illness:   Tony Ruggiero is a 29 y.o. female  with PMH of CHTN status post  (24) presents for her 6 week postpartum visit. No complaints. Infant doing well, bottle feeding. Lochia resolved. Mood is good. Does not desire contraceptive management.       Gyn History:  Menstrual History  Cycle: Yes  Menarche Age: 14 years  Flow Duration: 7  Flow: Normal  Interval:  (1st cycle since delivery)  Intermenstrual Bleeding: No  Dysmenorrhea: No    Menopause  Menopause Age: 2 years    Pap History  Last pap date: 10/26/23 (NIL -GC/CZ/TV)  Result: Normal  History of Abnormal Pap: No  HPV Vaccine Completed: No (2/3)    McC[place]  Sexually Active: No  STI History: Yes  STI Type: Chlamydia  Contraception: No    Breast History  Last Breast Imaging Date: No  History of Breast Biopsy: No        Review of Systems:  General/Constitutional: Chills denies. Fatigue/weakness denies. Fever denies. Night sweats denies. Hot flashes denies  Gastrointestinal: Abdominal pain denies. Blood in stool denies. Constipation denies. Diarrhea denies. Heartburn denies. Nausea denies. Vomiting denies   Genitourinary: Incontinence denies. Blood in urine denies. Frequent urination denies. Urgency denies. Painful urination denies. Nocturia denies   Gynecologic: Irregular menses denies. Heavy bleeding  denies. Painful menses denies. Vaginal discharge denies. Vaginal odor denies. Vaginal itching/Irritation denies. Vaginal lesion denies.  Pelvic pain denies. Decreased libido denies. Vulvar lesion denies. Prolapse of genital organs denies. Painful intercourse denies. Postcoital bleeding denies   Psychiatric: Mood lability denies. Depressed mood denies. Suicidal thoughts denies. Anxiety denies. Overwhelmed denies. Appetite normal. Energy level normal     OB History    Para Term  AB Living   4 4 4 0 0 4   SAB IAB Ectopic Multiple Live Births   0 0 0 0 4      # 1 - Date:  "12/10/15, Sex: Female, Weight: 2.92 kg (6 lb 7 oz), GA: 37w6d, Type: Vaginal, Spontaneous, Apgar1: None, Apgar5: None, Living: Living, Birth Comments: None    # 2 - Date: 02/17/20, Sex: Female, Weight: 2.778 kg (6 lb 2 oz), GA: 37w6d, Type: Vaginal, Spontaneous, Apgar1: None, Apgar5: None, Living: Living, Birth Comments: None    # 3 - Date: 05/10/22, Sex: Male, Weight: 3.062 kg (6 lb 12 oz), GA: 37w5d, Type: Vaginal, Spontaneous, Apgar1: None, Apgar5: None, Living: Living, Birth Comments: None    # 4 - Date: 11/18/24, Sex: Female, Weight: 3.025 kg (6 lb 10.7 oz), GA: 37w1d, Type: Vaginal, Spontaneous, Apgar1: 8, Apgar5: 9, Living: Living, Birth Comments: None      Past Medical History:   Diagnosis Date    Gestational hypertension          Current Outpatient Medications:     ibuprofen 20 mg/mL oral liquid, Take 30 mLs (600 mg total) by mouth every 6 (six) hours as needed for Pain., Disp: 480 mL, Rfl: 2    iron-vitamin C 100-250 mg, ICAR-C, (ICAR-C) 100-250 mg Tab, Take 1 tablet by mouth once daily., Disp: 30 tablet, Rfl: 2    labetaloL (NORMODYNE) 300 MG tablet, Take 1 tablet (300 mg total) by mouth every 8 (eight) hours., Disp: 90 tablet, Rfl: 11    NIFEdipine (PROCARDIA-XL) 60 MG (OSM) 24 hr tablet, Take 1 tablet (60 mg total) by mouth every 12 (twelve) hours., Disp: 60 tablet, Rfl: 0    PNV no.153/FA/om3/dha/epa/fish (PRENATAL GUMMIES ORAL), Take 2 Units by mouth Daily., Disp: , Rfl:     hydrALAZINE (APRESOLINE) 25 MG tablet, Take 1 tablet (25 mg total) by mouth every 6 (six) hours. for 60 doses, Disp: 60 tablet, Rfl: 0      Physical Exam:  /76   Temp 96.8 °F (36 °C)   Ht 5' 5" (1.651 m)   Wt 106.8 kg (235 lb 6.4 oz)   LMP 01/01/2025   Breastfeeding No   BMI 39.17 kg/m²      Chaperone present.     Constitutional: General appearance: healthy, well-nourished and well-developed   Psychiatric: Orientation to time, place and person. Normal mood and affect and active, alert   Breast: Inspection/palpation: no " tenderness, masses, skin changes or abnormal secretions   Abdomen: Auscultation/Inspection/Palpation: No tenderness or masses. Soft, nondistended   Female Genitalia:      Vulva: no masses, atrophy or lesions      Bladder/Urethra: no urethral discharge or mass, normal meatus, bladder non-distended.      Vagina: no tenderness, erythema, cystocele, rectocele, abnormal vaginal discharge, or vesicle(s) or ulcers                   Cervix: no discharge or cervical motion tenderness and grossly normal      Uterus: normal size and shape and midline, non-tender, and no uterine prolapse.      Adnexa/Parametria: no parametrial tenderness or mass, no adnexal tenderness or ovarian mass.       Assessment/Plan:  1. Postpartum exam  Patient doing well  Diet, exercise encouraged  Multivitamin    2. Chronic hypertension  Educated  CURRENTLY ON: Labetalol 300 mg q8hrs, Procardia 60 mg q12hrs, and Hydralazine 25 mg q6hrs  DECREASE TO: Labetalol 300mg  to q 12 hrs and cut Procardia  to 30 mg q 12 hrs   Follow up Friday on [facility] for BP check  If BP low normal, consider to decrease BP medication and THEN FU  1/20/25 on JEAN      Vitals:    01/15/25 1419   BP: 128/76       3. Encounter for other general counseling or advice on contraception  Discussed with patient contraceptive options including natural family planning, barrier, oral contraceptives, patch, NuvaRing, Depo-Provera, Nexplanon, IUDs, abstinence.       Safe sex education     Discussed with patient that birth control options discussed above do not protect against STDs.    Patient declines          This note was transcribed by Gladys Ford MA. There may be transcription errors as a result, however minimal. I agree with transcription and every effort has been made to ensure accuracy of transcription, but any obvious errors or omissions should be clarified with the author of the document.

## 2025-01-15 ENCOUNTER — POSTPARTUM VISIT (OUTPATIENT)
Dept: OBSTETRICS AND GYNECOLOGY | Facility: CLINIC | Age: 30
End: 2025-01-15
Payer: MEDICAID

## 2025-01-15 VITALS
DIASTOLIC BLOOD PRESSURE: 76 MMHG | TEMPERATURE: 97 F | BODY MASS INDEX: 39.22 KG/M2 | WEIGHT: 235.38 LBS | HEIGHT: 65 IN | SYSTOLIC BLOOD PRESSURE: 128 MMHG

## 2025-01-15 DIAGNOSIS — Z30.09 ENCOUNTER FOR OTHER GENERAL COUNSELING OR ADVICE ON CONTRACEPTION: ICD-10-CM

## 2025-01-15 DIAGNOSIS — I10 CHRONIC HYPERTENSION: ICD-10-CM

## 2025-07-11 NOTE — PROGRESS NOTES
"Maternal Fetal Medicine follow up consult    SUBJECTIVE:     Tony Ruggiero is a 29 y.o.  female with IUP at 35w1d who is seen in follow up consultation by MFM.  Pregnancy complications include:   Problem   - Abnormal findings on prenatal screening   -BMI affecting pregnancy in third trimester   - Chronic hypertension affecting pregnancy     She is feeling well and has no current complaints. No LOF, VB, ctx. +FM.   Recent triage visit for syncope but [place] and she thinks that she is doing fine.  Workup was negative.  /85 today.    Previous notes reviewed.   No changes to medical, surgical, family, social, or obstetric history.  Interval history since last MFM visit: see above  Medications reviewed.    Care team members:  Dr Velez - Primary OB     OBJECTIVE:   /85 (BP Location: Right arm, Patient Position: Sitting)   Pulse (!) 116   Ht 5' 5" (1.651 m)   Wt 115.1 kg (253 lb 12 oz)   LMP 2024   BMI 42.23 kg/m²     Ultrasound performed. See viewpoint for full ultrasound report.    A viable blackwood pregnancy is visualized in cephalic presentation.  Estimated fetal weight is at the 27th percentile with an abdominal circumference at the 38th percentile.    No fetal abnormalities are noted and anatomic survey is complete. Amniotic fluid volume is normal.  Placenta is posterior, fundal. Biophysical profile is 8/8.     ASSESSMENT/PLAN:     29 y.o.  female with IUP at 35w1d    - Chronic hypertension affecting pregnancy  Previously counseled the patient on maternal/fetal risks associated with CHTN during pregnancy. Risks include but not limited to fetal growth restriction, miscarriage, abruption, maternal end organ disease (renal failure, MI, and stroke),  delivery, development of superimposed preeclampsia, and eclampsia. She was counseled on the recommendations for blood pressure control, serial ultrasound for fetal growth assessment " and  testing, and timing of delivery. I also counseled her on the recommendation for aspirin 81 mg daily which may decrease her risk of developing superimposed preeclampsia.     10/3/24- Initial /87. Repeat /89. Previously, we Rx'ed Labetalol 200mg BID. She states she only took the medication one time and d/c'ed because it made her feel 'funny' and her BP dropped to 100s/60s, per her report. BP at primary OB's office two days ago 138/80.    24- Doing well. BP normal. No meds. See delivery recs.     Recommendations (Please refer to [facility] guidelines):  -Continue aspirin 81 mg daily for preeclampsia risk reduction  -No meds currently.   -Baseline evaluation with primary OB:   24-hour urine protein or baseline P/C ratio, CMP, and CBC (completed)  Maternal EKG  Maternal ophthalmic evaluation  Maternal echocardiogram if HTN has been long-standing or EKG is abnormal  -Serial fetal growth ultrasounds every 4-6 weeks, beginning at 26-28 weeks.   -Continued close observation of patient's blood pressures. Avoid hypotension as this has been associated with uteroplacental insufficiency.  -In conjunction with the CHAP study recommendation for BP control: If BP is persistently >=140/90 antihypertensive medication is recommended with goal BP of 120-140/80-90.  -Weekly antepartum testing at 32 weeks (NST+AFV); twice weekly testing if control is poor, multiple comorbidities are present, or requires several medications for control   -Delivery timing:  No medications, no comorbid conditions: 39 0/7 - 39 6/7 weeks gestation  No medications, comorbid conditions: 38 0/7 - 38 6/7 weeks gestation  Controlled on single agent, no comorbid conditions: 38 0/7 - 38 6/7 weeks gestation  Controlled on single agent, comorbid conditions: 37 0/7 - 38 6/7 weeks gestation  Uncontrolled or requiring >= 2 medications: 36 0/7 - 37 6/7 weeks gestation    Comorbid conditions include BMI >= 40, diabetes, and complex medical  condition associated with placental dysfunction (ie lupus or other vascular disease)  Delivery may be recommended earlier pending results of fetal growth ultrasounds, AFV assessment, or antepartum testing results.        - Abnormal findings on prenatal screening  With routine prenatal labs, genetic carrier testing (NIPT through [facility]) was performed. 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 Prior to this pregnancy, she was unaware of any genetic conditions she may carry. The father of the baby had carrier testing drawn this morning with pending results.    We have discussed this motor neuron disease, briefly, with her. There are four different types of SMA with varying degrees of motor function. It is an autosomal recessive condition. We have discussed this particular type of inheritance pattern. 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 If her partner is a carrier, the risk of passing SMA would be her risk of 1 in 34 plus the 25% chance they would both pass a mutated copy.    9-FOB testing was negative.  The risk of SMA is significantly decreased based on this result.    -BMI affecting pregnancy in third trimester  Please see prior documentation for specific counseling.      BMI 42    Recommendations:  TWG goal is 11-20 lbs  Screen for signs/symptoms of obstructive sleep apnea  Nutritionist consult offered (this is to be ordered by primary OB provider)  Continue low dose aspirin 81 mg daily for preeclampsia risk reduction  Targeted anatomical survey scheduled at 18-20 weeks (completed)  Fetal growth ultrasound at 32 weeks if BMI >= 40  Weekly  testing at 32 weeks if pre-pregnancy  BMI >= 45  Lovenox 40 mg BID for VTE prophylaxis while admitted to the hospital (antepartum or postpartum) if BMI >= 40.   Encouraged breastfeeding  Postpartum lifestyle modifications & weight loss      No further MFM visits scheduled. Weekly NSTs with OB office.    Beata Garcia MD  Maternal Fetal Medicine          show

## (undated) DEVICE — HOLDER SCALPEL SURGICAL GOLD

## (undated) DEVICE — ELECTRODE MEGADYNE L-WIRE 33CM

## (undated) DEVICE — DISSECTOR EPIX LAPA 5MMX35CM

## (undated) DEVICE — SODIUM CHLORIDE 0.9% 1000ML

## (undated) DEVICE — RELOAD ECHELON FLEX BLU 60MM

## (undated) DEVICE — SLEEVE KII ADV FIX 5X100MM

## (undated) DEVICE — SET TUB INSUFFLATION SUC 20L

## (undated) DEVICE — SCALPEL #11 BLADE STRL DISP

## (undated) DEVICE — ELECTRODE ELECSURG L-HK 32CM

## (undated) DEVICE — ADHESIVE MASTISOL VIAL 48/BX

## (undated) DEVICE — Device

## (undated) DEVICE — TROCAR ENDO Z THREAD KII 5X100

## (undated) DEVICE — SOL 9P NACL IRR PIC IL

## (undated) DEVICE — NDL HYPODERMIC SAF 25G 1.5IN

## (undated) DEVICE — DISSECTOR 5MM ENDOPATH

## (undated) DEVICE — MEDIPORE+PAD

## (undated) DEVICE — KIT SYR BIOGLUE 5-PACK 2ML

## (undated) DEVICE — DRAPE DEVON INSTRUMENT 10X16IN

## (undated) DEVICE — APPLIER CLIP EPIX UNIV 5X34

## (undated) DEVICE — SUT MONOCRYL 4-0 PS-2

## (undated) DEVICE — SYR DISP LL 5CC

## (undated) DEVICE — IRRIGATOR HYDRO-SURG PLUS 5MM

## (undated) DEVICE — NDL INSUF ULTRA VERESS 120MM

## (undated) DEVICE — SCISSOR CURVED ENDOPATH 5MM

## (undated) DEVICE — TROCAR KII FIOS 12MM X 100MM

## (undated) DEVICE — NDL GRANEE OPEN LOOP GRASPER

## (undated) DEVICE — STAPLER ECHELON FLEX GST 60MM

## (undated) DEVICE — BAG TISSUE RETRIEVAL 5MM

## (undated) DEVICE — GLOVE PROTEXIS HYDROGEL SZ7.5

## (undated) DEVICE — CARTRIDGE BABCOCK GRASPER 5X38

## (undated) DEVICE — DRAIN SIL FLT 7MM FULL PERF ST